# Patient Record
Sex: MALE | Race: WHITE | Employment: FULL TIME | URBAN - METROPOLITAN AREA
[De-identification: names, ages, dates, MRNs, and addresses within clinical notes are randomized per-mention and may not be internally consistent; named-entity substitution may affect disease eponyms.]

---

## 2017-01-05 ENCOUNTER — OFFICE VISIT (OUTPATIENT)
Dept: PAIN MANAGEMENT | Age: 57
End: 2017-01-05

## 2017-01-05 VITALS
BODY MASS INDEX: 26.32 KG/M2 | SYSTOLIC BLOOD PRESSURE: 135 MMHG | WEIGHT: 205 LBS | HEART RATE: 83 BPM | DIASTOLIC BLOOD PRESSURE: 90 MMHG

## 2017-01-05 DIAGNOSIS — G89.4 CHRONIC PAIN SYNDROME: ICD-10-CM

## 2017-01-05 DIAGNOSIS — M79.7 FIBROMYALGIA: ICD-10-CM

## 2017-01-05 DIAGNOSIS — M54.16 LUMBAR RADICULITIS: ICD-10-CM

## 2017-01-05 DIAGNOSIS — F51.01 PRIMARY INSOMNIA: ICD-10-CM

## 2017-01-05 DIAGNOSIS — M54.12 BRACHIAL NEURITIS OR RADICULITIS: ICD-10-CM

## 2017-01-05 DIAGNOSIS — M96.1 POSTLAMINECTOMY SYNDROME, LUMBAR REGION: ICD-10-CM

## 2017-01-05 DIAGNOSIS — M96.1 FAILED NECK SYNDROME: ICD-10-CM

## 2017-01-05 DIAGNOSIS — M47.816 OSTEOARTHRITIS OF LUMBAR SPINE, UNSPECIFIED SPINAL OSTEOARTHRITIS COMPLICATION STATUS: ICD-10-CM

## 2017-01-05 DIAGNOSIS — M51.36 DDD (DEGENERATIVE DISC DISEASE), LUMBAR: ICD-10-CM

## 2017-01-05 PROCEDURE — 99213 OFFICE O/P EST LOW 20 MIN: CPT | Performed by: INTERNAL MEDICINE

## 2017-01-05 PROCEDURE — 20553 NJX 1/MLT TRIGGER POINTS 3/>: CPT | Performed by: INTERNAL MEDICINE

## 2017-01-05 RX ORDER — TRIAMCINOLONE ACETONIDE 40 MG/ML
40 INJECTION, SUSPENSION INTRA-ARTICULAR; INTRAMUSCULAR ONCE
Status: COMPLETED | OUTPATIENT
Start: 2017-01-05 | End: 2017-01-05

## 2017-01-05 RX ORDER — OXYCODONE AND ACETAMINOPHEN 7.5; 325 MG/1; MG/1
1 TABLET ORAL EVERY 6 HOURS PRN
Qty: 120 TABLET | Refills: 0 | Status: SHIPPED | OUTPATIENT
Start: 2017-01-05 | End: 2017-02-07 | Stop reason: SDUPTHER

## 2017-01-05 RX ORDER — ZOLPIDEM TARTRATE 10 MG/1
TABLET ORAL
Qty: 30 TABLET | Refills: 1 | Status: SHIPPED | OUTPATIENT
Start: 2017-01-05 | End: 2017-02-07 | Stop reason: SDUPTHER

## 2017-01-05 RX ADMIN — TRIAMCINOLONE ACETONIDE 40 MG: 40 INJECTION, SUSPENSION INTRA-ARTICULAR; INTRAMUSCULAR at 09:12

## 2017-02-07 ENCOUNTER — OFFICE VISIT (OUTPATIENT)
Dept: PAIN MANAGEMENT | Age: 57
End: 2017-02-07

## 2017-02-07 VITALS
BODY MASS INDEX: 27.35 KG/M2 | HEART RATE: 73 BPM | SYSTOLIC BLOOD PRESSURE: 138 MMHG | WEIGHT: 213 LBS | DIASTOLIC BLOOD PRESSURE: 86 MMHG

## 2017-02-07 DIAGNOSIS — M96.1 POSTLAMINECTOMY SYNDROME, LUMBAR REGION: ICD-10-CM

## 2017-02-07 DIAGNOSIS — M96.1 FAILED NECK SYNDROME: ICD-10-CM

## 2017-02-07 DIAGNOSIS — M47.816 OSTEOARTHRITIS OF LUMBAR SPINE, UNSPECIFIED SPINAL OSTEOARTHRITIS COMPLICATION STATUS: ICD-10-CM

## 2017-02-07 DIAGNOSIS — G89.4 CHRONIC PAIN SYNDROME: ICD-10-CM

## 2017-02-07 DIAGNOSIS — M51.36 DDD (DEGENERATIVE DISC DISEASE), LUMBAR: ICD-10-CM

## 2017-02-07 DIAGNOSIS — F51.01 PRIMARY INSOMNIA: ICD-10-CM

## 2017-02-07 DIAGNOSIS — M54.16 LUMBAR RADICULITIS: ICD-10-CM

## 2017-02-07 DIAGNOSIS — M54.12 BRACHIAL NEURITIS OR RADICULITIS: ICD-10-CM

## 2017-02-07 DIAGNOSIS — M79.7 FIBROMYALGIA: ICD-10-CM

## 2017-02-07 PROCEDURE — 99213 OFFICE O/P EST LOW 20 MIN: CPT | Performed by: INTERNAL MEDICINE

## 2017-02-07 PROCEDURE — 20553 NJX 1/MLT TRIGGER POINTS 3/>: CPT | Performed by: INTERNAL MEDICINE

## 2017-02-07 RX ORDER — TRIAMCINOLONE ACETONIDE 40 MG/ML
40 INJECTION, SUSPENSION INTRA-ARTICULAR; INTRAMUSCULAR ONCE
Status: COMPLETED | OUTPATIENT
Start: 2017-02-07 | End: 2017-02-07

## 2017-02-07 RX ORDER — ZOLPIDEM TARTRATE 10 MG/1
TABLET ORAL
Qty: 30 TABLET | Refills: 1 | Status: SHIPPED | OUTPATIENT
Start: 2017-02-07 | End: 2017-03-07 | Stop reason: SDUPTHER

## 2017-02-07 RX ORDER — OXYCODONE AND ACETAMINOPHEN 7.5; 325 MG/1; MG/1
1 TABLET ORAL EVERY 6 HOURS PRN
Qty: 120 TABLET | Refills: 0 | Status: SHIPPED | OUTPATIENT
Start: 2017-02-07 | End: 2017-03-07 | Stop reason: SDUPTHER

## 2017-02-07 RX ADMIN — TRIAMCINOLONE ACETONIDE 40 MG: 40 INJECTION, SUSPENSION INTRA-ARTICULAR; INTRAMUSCULAR at 09:28

## 2017-03-07 ENCOUNTER — OFFICE VISIT (OUTPATIENT)
Dept: PAIN MANAGEMENT | Age: 57
End: 2017-03-07

## 2017-03-07 VITALS
SYSTOLIC BLOOD PRESSURE: 138 MMHG | BODY MASS INDEX: 26.06 KG/M2 | WEIGHT: 203 LBS | DIASTOLIC BLOOD PRESSURE: 88 MMHG | HEART RATE: 73 BPM

## 2017-03-07 DIAGNOSIS — G89.4 CHRONIC PAIN SYNDROME: ICD-10-CM

## 2017-03-07 DIAGNOSIS — M51.36 DDD (DEGENERATIVE DISC DISEASE), LUMBAR: ICD-10-CM

## 2017-03-07 DIAGNOSIS — M54.12 BRACHIAL NEURITIS OR RADICULITIS: ICD-10-CM

## 2017-03-07 DIAGNOSIS — M96.1 FAILED NECK SYNDROME: ICD-10-CM

## 2017-03-07 DIAGNOSIS — M47.816 OSTEOARTHRITIS OF LUMBAR SPINE, UNSPECIFIED SPINAL OSTEOARTHRITIS COMPLICATION STATUS: ICD-10-CM

## 2017-03-07 DIAGNOSIS — M54.16 LUMBAR RADICULITIS: ICD-10-CM

## 2017-03-07 DIAGNOSIS — M79.7 FIBROMYALGIA: ICD-10-CM

## 2017-03-07 DIAGNOSIS — M96.1 POSTLAMINECTOMY SYNDROME, LUMBAR REGION: ICD-10-CM

## 2017-03-07 PROCEDURE — 99213 OFFICE O/P EST LOW 20 MIN: CPT | Performed by: INTERNAL MEDICINE

## 2017-03-07 RX ORDER — OXYCODONE AND ACETAMINOPHEN 7.5; 325 MG/1; MG/1
1 TABLET ORAL EVERY 6 HOURS PRN
Qty: 120 TABLET | Refills: 0 | Status: SHIPPED | OUTPATIENT
Start: 2017-03-07 | End: 2017-04-04 | Stop reason: SDUPTHER

## 2017-03-07 RX ORDER — ZOLPIDEM TARTRATE 10 MG/1
TABLET ORAL
Qty: 30 TABLET | Refills: 1 | Status: SHIPPED | OUTPATIENT
Start: 2017-03-07 | End: 2017-04-04 | Stop reason: SDUPTHER

## 2017-04-04 ENCOUNTER — OFFICE VISIT (OUTPATIENT)
Dept: PAIN MANAGEMENT | Age: 57
End: 2017-04-04

## 2017-04-04 VITALS
SYSTOLIC BLOOD PRESSURE: 114 MMHG | WEIGHT: 205 LBS | BODY MASS INDEX: 26.32 KG/M2 | DIASTOLIC BLOOD PRESSURE: 70 MMHG | HEART RATE: 66 BPM

## 2017-04-04 DIAGNOSIS — M96.1 FAILED NECK SYNDROME: ICD-10-CM

## 2017-04-04 DIAGNOSIS — M54.12 BRACHIAL NEURITIS OR RADICULITIS: ICD-10-CM

## 2017-04-04 DIAGNOSIS — M47.816 OSTEOARTHRITIS OF LUMBAR SPINE, UNSPECIFIED SPINAL OSTEOARTHRITIS COMPLICATION STATUS: ICD-10-CM

## 2017-04-04 DIAGNOSIS — M54.16 LUMBAR RADICULITIS: ICD-10-CM

## 2017-04-04 DIAGNOSIS — M79.7 FIBROMYALGIA: ICD-10-CM

## 2017-04-04 DIAGNOSIS — M96.1 POSTLAMINECTOMY SYNDROME, LUMBAR REGION: ICD-10-CM

## 2017-04-04 DIAGNOSIS — G89.4 CHRONIC PAIN SYNDROME: ICD-10-CM

## 2017-04-04 DIAGNOSIS — M51.36 DDD (DEGENERATIVE DISC DISEASE), LUMBAR: ICD-10-CM

## 2017-04-04 PROCEDURE — 99213 OFFICE O/P EST LOW 20 MIN: CPT | Performed by: INTERNAL MEDICINE

## 2017-04-04 RX ORDER — OXYCODONE AND ACETAMINOPHEN 7.5; 325 MG/1; MG/1
1 TABLET ORAL EVERY 6 HOURS PRN
Qty: 120 TABLET | Refills: 0 | Status: SHIPPED | OUTPATIENT
Start: 2017-04-04 | End: 2017-05-02 | Stop reason: SDUPTHER

## 2017-04-04 RX ORDER — ZOLPIDEM TARTRATE 10 MG/1
TABLET ORAL
Qty: 30 TABLET | Refills: 1 | Status: SHIPPED | OUTPATIENT
Start: 2017-04-04 | End: 2017-05-02 | Stop reason: SDUPTHER

## 2017-05-02 ENCOUNTER — OFFICE VISIT (OUTPATIENT)
Dept: PAIN MANAGEMENT | Age: 57
End: 2017-05-02

## 2017-05-02 VITALS
DIASTOLIC BLOOD PRESSURE: 94 MMHG | SYSTOLIC BLOOD PRESSURE: 146 MMHG | BODY MASS INDEX: 27.35 KG/M2 | WEIGHT: 213 LBS | HEART RATE: 71 BPM

## 2017-05-02 DIAGNOSIS — M96.1 POSTLAMINECTOMY SYNDROME, LUMBAR REGION: ICD-10-CM

## 2017-05-02 DIAGNOSIS — G89.4 CHRONIC PAIN SYNDROME: ICD-10-CM

## 2017-05-02 DIAGNOSIS — M54.16 LUMBAR RADICULITIS: ICD-10-CM

## 2017-05-02 DIAGNOSIS — M51.36 DDD (DEGENERATIVE DISC DISEASE), LUMBAR: ICD-10-CM

## 2017-05-02 DIAGNOSIS — M96.1 FAILED NECK SYNDROME: ICD-10-CM

## 2017-05-02 DIAGNOSIS — M79.7 FIBROMYALGIA: ICD-10-CM

## 2017-05-02 DIAGNOSIS — M47.816 OSTEOARTHRITIS OF LUMBAR SPINE, UNSPECIFIED SPINAL OSTEOARTHRITIS COMPLICATION STATUS: ICD-10-CM

## 2017-05-02 PROCEDURE — 99213 OFFICE O/P EST LOW 20 MIN: CPT | Performed by: INTERNAL MEDICINE

## 2017-05-02 RX ORDER — ZOLPIDEM TARTRATE 10 MG/1
TABLET ORAL
Qty: 30 TABLET | Refills: 0 | Status: SHIPPED | OUTPATIENT
Start: 2017-05-02 | End: 2017-05-30 | Stop reason: SDUPTHER

## 2017-05-02 RX ORDER — OXYCODONE AND ACETAMINOPHEN 7.5; 325 MG/1; MG/1
1 TABLET ORAL EVERY 6 HOURS PRN
Qty: 120 TABLET | Refills: 0 | Status: SHIPPED | OUTPATIENT
Start: 2017-05-02 | End: 2017-05-30 | Stop reason: SDUPTHER

## 2017-05-30 ENCOUNTER — OFFICE VISIT (OUTPATIENT)
Dept: PAIN MANAGEMENT | Age: 57
End: 2017-05-30

## 2017-05-30 VITALS
HEART RATE: 72 BPM | DIASTOLIC BLOOD PRESSURE: 88 MMHG | WEIGHT: 213 LBS | BODY MASS INDEX: 27.35 KG/M2 | SYSTOLIC BLOOD PRESSURE: 139 MMHG

## 2017-05-30 DIAGNOSIS — M96.1 FAILED NECK SYNDROME: ICD-10-CM

## 2017-05-30 DIAGNOSIS — M54.16 LUMBAR RADICULITIS: ICD-10-CM

## 2017-05-30 DIAGNOSIS — M51.36 DDD (DEGENERATIVE DISC DISEASE), LUMBAR: ICD-10-CM

## 2017-05-30 DIAGNOSIS — M79.7 FIBROMYALGIA: ICD-10-CM

## 2017-05-30 DIAGNOSIS — G89.4 CHRONIC PAIN SYNDROME: ICD-10-CM

## 2017-05-30 DIAGNOSIS — M96.1 POSTLAMINECTOMY SYNDROME, LUMBAR REGION: ICD-10-CM

## 2017-05-30 PROCEDURE — 99213 OFFICE O/P EST LOW 20 MIN: CPT | Performed by: INTERNAL MEDICINE

## 2017-05-30 RX ORDER — OXYCODONE AND ACETAMINOPHEN 7.5; 325 MG/1; MG/1
1 TABLET ORAL EVERY 6 HOURS PRN
Qty: 120 TABLET | Refills: 0 | Status: SHIPPED | OUTPATIENT
Start: 2017-05-30 | End: 2017-06-27 | Stop reason: SDUPTHER

## 2017-05-30 RX ORDER — ZOLPIDEM TARTRATE 10 MG/1
TABLET ORAL
Qty: 30 TABLET | Refills: 0 | Status: SHIPPED | OUTPATIENT
Start: 2017-05-30 | End: 2017-06-27 | Stop reason: SDUPTHER

## 2017-06-08 DIAGNOSIS — M96.1 POSTLAMINECTOMY SYNDROME, LUMBAR REGION: ICD-10-CM

## 2017-06-08 DIAGNOSIS — M51.36 DDD (DEGENERATIVE DISC DISEASE), LUMBAR: ICD-10-CM

## 2017-06-08 DIAGNOSIS — G89.4 CHRONIC PAIN SYNDROME: ICD-10-CM

## 2017-06-08 DIAGNOSIS — M54.16 LUMBAR RADICULITIS: ICD-10-CM

## 2017-06-08 DIAGNOSIS — M96.1 FAILED NECK SYNDROME: ICD-10-CM

## 2017-06-08 DIAGNOSIS — M54.12 BRACHIAL NEURITIS OR RADICULITIS: ICD-10-CM

## 2017-06-08 DIAGNOSIS — M47.816 OSTEOARTHRITIS OF LUMBAR SPINE, UNSPECIFIED SPINAL OSTEOARTHRITIS COMPLICATION STATUS: ICD-10-CM

## 2017-06-08 RX ORDER — DICLOFENAC SODIUM 75 MG/1
TABLET, DELAYED RELEASE ORAL
Qty: 60 TABLET | Refills: 0 | Status: SHIPPED | OUTPATIENT
Start: 2017-06-08 | End: 2017-06-27 | Stop reason: SDUPTHER

## 2017-06-27 ENCOUNTER — OFFICE VISIT (OUTPATIENT)
Dept: PAIN MANAGEMENT | Age: 57
End: 2017-06-27

## 2017-06-27 VITALS
BODY MASS INDEX: 27.35 KG/M2 | WEIGHT: 213 LBS | HEART RATE: 65 BPM | DIASTOLIC BLOOD PRESSURE: 76 MMHG | SYSTOLIC BLOOD PRESSURE: 125 MMHG

## 2017-06-27 DIAGNOSIS — M54.12 BRACHIAL NEURITIS OR RADICULITIS: ICD-10-CM

## 2017-06-27 DIAGNOSIS — M54.16 LUMBAR RADICULITIS: ICD-10-CM

## 2017-06-27 DIAGNOSIS — M96.1 FAILED NECK SYNDROME: ICD-10-CM

## 2017-06-27 DIAGNOSIS — M96.1 POSTLAMINECTOMY SYNDROME, LUMBAR REGION: ICD-10-CM

## 2017-06-27 DIAGNOSIS — G89.4 CHRONIC PAIN SYNDROME: ICD-10-CM

## 2017-06-27 DIAGNOSIS — M79.7 FIBROMYALGIA: ICD-10-CM

## 2017-06-27 DIAGNOSIS — M51.36 DDD (DEGENERATIVE DISC DISEASE), LUMBAR: ICD-10-CM

## 2017-06-27 PROCEDURE — 99213 OFFICE O/P EST LOW 20 MIN: CPT | Performed by: INTERNAL MEDICINE

## 2017-06-27 RX ORDER — OXYCODONE AND ACETAMINOPHEN 7.5; 325 MG/1; MG/1
1 TABLET ORAL EVERY 6 HOURS PRN
Qty: 120 TABLET | Refills: 0 | Status: SHIPPED | OUTPATIENT
Start: 2017-06-27 | End: 2017-07-25 | Stop reason: SDUPTHER

## 2017-06-27 RX ORDER — ZOLPIDEM TARTRATE 10 MG/1
TABLET ORAL
Qty: 30 TABLET | Refills: 0 | Status: SHIPPED | OUTPATIENT
Start: 2017-06-27 | End: 2017-07-25 | Stop reason: SDUPTHER

## 2017-06-27 RX ORDER — DICLOFENAC SODIUM 75 MG/1
TABLET, DELAYED RELEASE ORAL
Qty: 60 TABLET | Refills: 0 | Status: SHIPPED | OUTPATIENT
Start: 2017-06-27 | End: 2017-07-25 | Stop reason: SDUPTHER

## 2017-07-25 ENCOUNTER — OFFICE VISIT (OUTPATIENT)
Dept: PAIN MANAGEMENT | Age: 57
End: 2017-07-25

## 2017-07-25 VITALS
SYSTOLIC BLOOD PRESSURE: 133 MMHG | DIASTOLIC BLOOD PRESSURE: 78 MMHG | BODY MASS INDEX: 26.96 KG/M2 | WEIGHT: 210 LBS | HEART RATE: 77 BPM

## 2017-07-25 DIAGNOSIS — M47.816 OSTEOARTHRITIS OF LUMBAR SPINE, UNSPECIFIED SPINAL OSTEOARTHRITIS COMPLICATION STATUS: ICD-10-CM

## 2017-07-25 DIAGNOSIS — M79.7 FIBROMYALGIA: ICD-10-CM

## 2017-07-25 DIAGNOSIS — F51.01 PRIMARY INSOMNIA: ICD-10-CM

## 2017-07-25 DIAGNOSIS — M96.1 FAILED NECK SYNDROME: ICD-10-CM

## 2017-07-25 DIAGNOSIS — M51.36 DDD (DEGENERATIVE DISC DISEASE), LUMBAR: ICD-10-CM

## 2017-07-25 DIAGNOSIS — M54.12 BRACHIAL NEURITIS OR RADICULITIS: ICD-10-CM

## 2017-07-25 DIAGNOSIS — M54.16 LUMBAR RADICULITIS: ICD-10-CM

## 2017-07-25 DIAGNOSIS — M96.1 POSTLAMINECTOMY SYNDROME, LUMBAR REGION: ICD-10-CM

## 2017-07-25 DIAGNOSIS — G89.4 CHRONIC PAIN SYNDROME: ICD-10-CM

## 2017-07-25 PROCEDURE — 99213 OFFICE O/P EST LOW 20 MIN: CPT | Performed by: INTERNAL MEDICINE

## 2017-07-25 RX ORDER — ZOLPIDEM TARTRATE 10 MG/1
TABLET ORAL
Qty: 30 TABLET | Refills: 0 | Status: SHIPPED | OUTPATIENT
Start: 2017-07-25 | End: 2017-08-25 | Stop reason: SDUPTHER

## 2017-07-25 RX ORDER — OXYCODONE AND ACETAMINOPHEN 7.5; 325 MG/1; MG/1
1 TABLET ORAL EVERY 6 HOURS PRN
Qty: 120 TABLET | Refills: 0 | Status: SHIPPED | OUTPATIENT
Start: 2017-07-25 | End: 2017-08-25 | Stop reason: SDUPTHER

## 2017-07-25 RX ORDER — DICLOFENAC SODIUM 75 MG/1
TABLET, DELAYED RELEASE ORAL
Qty: 60 TABLET | Refills: 1 | Status: SHIPPED | OUTPATIENT
Start: 2017-07-25 | End: 2017-09-26 | Stop reason: SDUPTHER

## 2017-08-25 ENCOUNTER — OFFICE VISIT (OUTPATIENT)
Dept: PAIN MANAGEMENT | Age: 57
End: 2017-08-25

## 2017-08-25 VITALS
BODY MASS INDEX: 28.04 KG/M2 | WEIGHT: 218.4 LBS | DIASTOLIC BLOOD PRESSURE: 90 MMHG | SYSTOLIC BLOOD PRESSURE: 139 MMHG | HEART RATE: 68 BPM

## 2017-08-25 DIAGNOSIS — M51.36 DDD (DEGENERATIVE DISC DISEASE), LUMBAR: ICD-10-CM

## 2017-08-25 DIAGNOSIS — M96.1 POSTLAMINECTOMY SYNDROME, LUMBAR REGION: ICD-10-CM

## 2017-08-25 DIAGNOSIS — M96.1 FAILED NECK SYNDROME: ICD-10-CM

## 2017-08-25 DIAGNOSIS — M79.7 FIBROMYALGIA: ICD-10-CM

## 2017-08-25 DIAGNOSIS — M54.16 LUMBAR RADICULITIS: ICD-10-CM

## 2017-08-25 DIAGNOSIS — G89.4 CHRONIC PAIN SYNDROME: ICD-10-CM

## 2017-08-25 PROCEDURE — 99213 OFFICE O/P EST LOW 20 MIN: CPT | Performed by: INTERNAL MEDICINE

## 2017-08-25 PROCEDURE — 20553 NJX 1/MLT TRIGGER POINTS 3/>: CPT | Performed by: INTERNAL MEDICINE

## 2017-08-25 RX ORDER — OXYCODONE AND ACETAMINOPHEN 7.5; 325 MG/1; MG/1
1 TABLET ORAL EVERY 6 HOURS PRN
Qty: 120 TABLET | Refills: 0 | Status: SHIPPED | OUTPATIENT
Start: 2017-08-25 | End: 2017-09-26 | Stop reason: SDUPTHER

## 2017-08-25 RX ORDER — TRIAMCINOLONE ACETONIDE 40 MG/ML
40 INJECTION, SUSPENSION INTRA-ARTICULAR; INTRAMUSCULAR ONCE
Status: COMPLETED | OUTPATIENT
Start: 2017-08-25 | End: 2017-08-25

## 2017-08-25 RX ORDER — ZOLPIDEM TARTRATE 10 MG/1
TABLET ORAL
Qty: 30 TABLET | Refills: 0 | Status: SHIPPED | OUTPATIENT
Start: 2017-08-25 | End: 2017-09-26 | Stop reason: SDUPTHER

## 2017-08-25 RX ADMIN — TRIAMCINOLONE ACETONIDE 40 MG: 40 INJECTION, SUSPENSION INTRA-ARTICULAR; INTRAMUSCULAR at 13:25

## 2017-09-26 ENCOUNTER — OFFICE VISIT (OUTPATIENT)
Dept: PAIN MANAGEMENT | Age: 57
End: 2017-09-26

## 2017-09-26 VITALS
BODY MASS INDEX: 27.48 KG/M2 | HEART RATE: 75 BPM | DIASTOLIC BLOOD PRESSURE: 96 MMHG | SYSTOLIC BLOOD PRESSURE: 137 MMHG | WEIGHT: 214 LBS

## 2017-09-26 DIAGNOSIS — M96.1 POSTLAMINECTOMY SYNDROME, LUMBAR REGION: ICD-10-CM

## 2017-09-26 DIAGNOSIS — M47.816 OSTEOARTHRITIS OF LUMBAR SPINE, UNSPECIFIED SPINAL OSTEOARTHRITIS COMPLICATION STATUS: ICD-10-CM

## 2017-09-26 DIAGNOSIS — M51.36 DDD (DEGENERATIVE DISC DISEASE), LUMBAR: ICD-10-CM

## 2017-09-26 DIAGNOSIS — M54.16 LUMBAR RADICULITIS: ICD-10-CM

## 2017-09-26 DIAGNOSIS — M54.12 BRACHIAL NEURITIS OR RADICULITIS: ICD-10-CM

## 2017-09-26 DIAGNOSIS — G89.4 CHRONIC PAIN SYNDROME: ICD-10-CM

## 2017-09-26 DIAGNOSIS — M96.1 FAILED NECK SYNDROME: ICD-10-CM

## 2017-09-26 DIAGNOSIS — M79.7 FIBROMYALGIA: ICD-10-CM

## 2017-09-26 PROCEDURE — 99213 OFFICE O/P EST LOW 20 MIN: CPT | Performed by: INTERNAL MEDICINE

## 2017-09-26 RX ORDER — OXYCODONE AND ACETAMINOPHEN 7.5; 325 MG/1; MG/1
1 TABLET ORAL EVERY 6 HOURS PRN
Qty: 112 TABLET | Refills: 0 | Status: SHIPPED | OUTPATIENT
Start: 2017-09-26 | End: 2017-09-26 | Stop reason: CLARIF

## 2017-09-26 RX ORDER — OXYCODONE AND ACETAMINOPHEN 7.5; 325 MG/1; MG/1
1 TABLET ORAL EVERY 6 HOURS PRN
Qty: 120 TABLET | Refills: 0 | Status: SHIPPED | OUTPATIENT
Start: 2017-09-26 | End: 2017-10-24 | Stop reason: SDUPTHER

## 2017-09-26 RX ORDER — ZOLPIDEM TARTRATE 10 MG/1
TABLET ORAL
Qty: 30 TABLET | Refills: 0 | Status: SHIPPED | OUTPATIENT
Start: 2017-09-26 | End: 2017-10-24 | Stop reason: SDUPTHER

## 2017-09-26 RX ORDER — DICLOFENAC SODIUM 75 MG/1
TABLET, DELAYED RELEASE ORAL
Qty: 60 TABLET | Refills: 0 | Status: SHIPPED | OUTPATIENT
Start: 2017-09-26 | End: 2017-10-24 | Stop reason: SDUPTHER

## 2017-10-24 ENCOUNTER — OFFICE VISIT (OUTPATIENT)
Dept: PAIN MANAGEMENT | Age: 57
End: 2017-10-24

## 2017-10-24 VITALS
DIASTOLIC BLOOD PRESSURE: 83 MMHG | BODY MASS INDEX: 27.22 KG/M2 | WEIGHT: 212 LBS | HEART RATE: 73 BPM | SYSTOLIC BLOOD PRESSURE: 140 MMHG

## 2017-10-24 DIAGNOSIS — M96.1 POSTLAMINECTOMY SYNDROME, LUMBAR REGION: ICD-10-CM

## 2017-10-24 DIAGNOSIS — M54.12 BRACHIAL NEURITIS OR RADICULITIS: ICD-10-CM

## 2017-10-24 DIAGNOSIS — M96.1 FAILED NECK SYNDROME: ICD-10-CM

## 2017-10-24 DIAGNOSIS — M51.36 DDD (DEGENERATIVE DISC DISEASE), LUMBAR: ICD-10-CM

## 2017-10-24 DIAGNOSIS — M79.7 FIBROMYALGIA: ICD-10-CM

## 2017-10-24 DIAGNOSIS — M54.16 LUMBAR RADICULITIS: ICD-10-CM

## 2017-10-24 DIAGNOSIS — G89.4 CHRONIC PAIN SYNDROME: ICD-10-CM

## 2017-10-24 PROCEDURE — G8484 FLU IMMUNIZE NO ADMIN: HCPCS | Performed by: INTERNAL MEDICINE

## 2017-10-24 PROCEDURE — 99213 OFFICE O/P EST LOW 20 MIN: CPT | Performed by: INTERNAL MEDICINE

## 2017-10-24 PROCEDURE — 1036F TOBACCO NON-USER: CPT | Performed by: INTERNAL MEDICINE

## 2017-10-24 PROCEDURE — G8419 CALC BMI OUT NRM PARAM NOF/U: HCPCS | Performed by: INTERNAL MEDICINE

## 2017-10-24 PROCEDURE — G8427 DOCREV CUR MEDS BY ELIG CLIN: HCPCS | Performed by: INTERNAL MEDICINE

## 2017-10-24 PROCEDURE — 3017F COLORECTAL CA SCREEN DOC REV: CPT | Performed by: INTERNAL MEDICINE

## 2017-10-24 RX ORDER — DICLOFENAC SODIUM 75 MG/1
TABLET, DELAYED RELEASE ORAL
Qty: 60 TABLET | Refills: 0 | Status: SHIPPED | OUTPATIENT
Start: 2017-10-24 | End: 2017-12-18 | Stop reason: SDUPTHER

## 2017-10-24 RX ORDER — ZOLPIDEM TARTRATE 10 MG/1
TABLET ORAL
Qty: 30 TABLET | Refills: 0 | Status: SHIPPED | OUTPATIENT
Start: 2017-10-24 | End: 2017-11-21 | Stop reason: SDUPTHER

## 2017-10-24 RX ORDER — OXYCODONE AND ACETAMINOPHEN 7.5; 325 MG/1; MG/1
1 TABLET ORAL EVERY 6 HOURS PRN
Qty: 120 TABLET | Refills: 0 | Status: SHIPPED | OUTPATIENT
Start: 2017-10-24 | End: 2017-11-21 | Stop reason: SDUPTHER

## 2017-10-24 NOTE — PROGRESS NOTES
[facility]  1960  [id number]    HISTORY OF PRESENT ILLNESS:  Mr. Dylan Pisano is a 65 y.o. male returns for a follow up visit for multiple medical problems. His current presenting problems are   1. Chronic pain syndrome    2. Postlaminectomy syndrome, lumbar region    3. DDD (degenerative disc disease), lumbar    4. Lumbar radiculitis    5. Failed neck syndrome    6. Fibromyalgia    7. Osteoarthritis of lumbar spine, unspecified spinal osteoarthritis complication status    8. Primary insomnia    . As per information/history obtained from the PADT(patient assessment and documentation tool) - He complains of pain in the neck, upper back and lower back with radiation to the hands Left, buttocks and feet Left He rates the pain 4/10 and describes it as sharp, aching. Pain is made worse by: movement, walking, standing, sitting, bending, lifting. Current treatment regimen has helped relieve about 60% of the pain. He denies side effects from the current pain regimen. Patient reports that since the last follow up visit the physical functioning is unchanged, family/social relationships are unchanged, mood is unchanged and sleep patterns are unchanged, and that the overall functioning is unchanged. Patient denies neurological bowel or bladder. Patient denies misusing/abusing his narcotic pain medications or using any illegal drugs. There are No indicators for possible drug abuse, addiction or diversion problems. Upon obtaining the medical history from Mr. Dylan Pisano regarding today's office visit for his presenting problems, patient states his pain has been baseline. He states his neck and back pain has been tolerable. He denies any side effects. Mr. Dylan Pisano mentions using Ambien, which helps with sleep. Patient states he sleeps well. Has normal sleep latency. Averages about 5-7 hours of sleep a night. Denies any signs of sleep apnea. Feels rested in the AM. Denies any sleep attacks during the day.  Medication Flat/blunted . IMPRESSION:   1. Chronic pain syndrome    2. Postlaminectomy syndrome, lumbar region    3. DDD (degenerative disc disease), lumbar    4. Lumbar radiculitis    5. Failed neck syndrome    6. Fibromyalgia    7. Brachial neuritis or radiculitis        PLAN:  Informed verbal consent was obtained  -Continue with current regimen   -ROM/stretching exercises as advised   -Continue with Percocet 3-4 times per day   -he was advised proper sleep hygiene-told to avoid:use of caffeine or other stimulants after noon, alcohol use near bedtime, long or frequent naps during the day, erratic sleep schedule, heavy meals near bedtime, vigorous exercise near bedtime and use of electronic devices near bedtime  -Continue Ambien   -Labs ordered CBC, CMP, and TSH. Current Outpatient Prescriptions   Medication Sig Dispense Refill    zolpidem (AMBIEN) 10 MG tablet TAKE ONE TABLET BY MOUTH ONCE NIGHTLY AS NEEDED FOR SLEEP 30 tablet 0    diclofenac (VOLTAREN) 75 MG EC tablet TAKE ONE TABLET BY MOUTH TWICE A DAY 60 tablet 0    oxyCODONE-acetaminophen (PERCOCET) 7.5-325 MG per tablet Take 1 tablet by mouth every 6 hours as needed for Pain  Max  4 per day. 120 tablet 0    ALPRAZolam (XANAX) 0.5 MG tablet Take 0.5 mg by mouth nightly as needed. No current facility-administered medications for this visit. I will continue his current medication regimen  which is part of the above treatment schedule. It has been helping with Mr. Melquiades Sousa chronic  medical problems which for this visit include:   Diagnoses of Chronic pain syndrome, Postlaminectomy syndrome, lumbar region, DDD (degenerative disc disease), lumbar, Lumbar radiculitis, Failed neck syndrome, Fibromyalgia, Osteoarthritis of lumbar spine, unspecified spinal osteoarthritis complication status, and Primary insomnia were pertinent to this visit.    Risks and benefits of the medications and other alternative treatments  including no treatment were discussed

## 2017-11-01 ENCOUNTER — TELEPHONE (OUTPATIENT)
Dept: PAIN MANAGEMENT | Age: 57
End: 2017-11-01

## 2017-11-01 NOTE — TELEPHONE ENCOUNTER
Following up on overdue results for CBC, CMP and TSH. RSM will review the need for testing with the patient at his next monthly office visit.

## 2017-11-21 ENCOUNTER — OFFICE VISIT (OUTPATIENT)
Dept: PAIN MANAGEMENT | Age: 57
End: 2017-11-21

## 2017-11-21 VITALS
HEART RATE: 80 BPM | SYSTOLIC BLOOD PRESSURE: 146 MMHG | WEIGHT: 212 LBS | DIASTOLIC BLOOD PRESSURE: 97 MMHG | BODY MASS INDEX: 27.22 KG/M2

## 2017-11-21 DIAGNOSIS — M51.36 DDD (DEGENERATIVE DISC DISEASE), LUMBAR: ICD-10-CM

## 2017-11-21 DIAGNOSIS — M47.816 LUMBAR SPONDYLOSIS: ICD-10-CM

## 2017-11-21 DIAGNOSIS — M96.1 POSTLAMINECTOMY SYNDROME, LUMBAR REGION: ICD-10-CM

## 2017-11-21 DIAGNOSIS — M96.1 FAILED NECK SYNDROME: ICD-10-CM

## 2017-11-21 DIAGNOSIS — M54.16 LUMBAR RADICULITIS: ICD-10-CM

## 2017-11-21 DIAGNOSIS — M79.7 FIBROMYALGIA: ICD-10-CM

## 2017-11-21 DIAGNOSIS — G89.4 CHRONIC PAIN SYNDROME: ICD-10-CM

## 2017-11-21 PROCEDURE — 1036F TOBACCO NON-USER: CPT | Performed by: INTERNAL MEDICINE

## 2017-11-21 PROCEDURE — 99213 OFFICE O/P EST LOW 20 MIN: CPT | Performed by: INTERNAL MEDICINE

## 2017-11-21 PROCEDURE — G8484 FLU IMMUNIZE NO ADMIN: HCPCS | Performed by: INTERNAL MEDICINE

## 2017-11-21 PROCEDURE — G8419 CALC BMI OUT NRM PARAM NOF/U: HCPCS | Performed by: INTERNAL MEDICINE

## 2017-11-21 PROCEDURE — G8427 DOCREV CUR MEDS BY ELIG CLIN: HCPCS | Performed by: INTERNAL MEDICINE

## 2017-11-21 PROCEDURE — 3017F COLORECTAL CA SCREEN DOC REV: CPT | Performed by: INTERNAL MEDICINE

## 2017-11-21 RX ORDER — ZOLPIDEM TARTRATE 10 MG/1
TABLET ORAL
Qty: 30 TABLET | Refills: 0 | Status: SHIPPED | OUTPATIENT
Start: 2017-11-21 | End: 2017-12-18 | Stop reason: SDUPTHER

## 2017-11-21 RX ORDER — OXYCODONE AND ACETAMINOPHEN 7.5; 325 MG/1; MG/1
1 TABLET ORAL EVERY 6 HOURS PRN
Qty: 120 TABLET | Refills: 0 | Status: SHIPPED | OUTPATIENT
Start: 2017-11-21 | End: 2017-12-18 | Stop reason: SDUPTHER

## 2017-11-21 NOTE — PROGRESS NOTES
Vicente Toscano  1960  [id number]    HISTORY OF PRESENT ILLNESS:  Mr. Eva Chen is a 65 y.o. male returns for a follow up visit for multiple medical problems. His current presenting problems are   1. Chronic pain syndrome    2. Fibromyalgia    3. Primary insomnia    4. DDD (degenerative disc disease), lumbar    5. Lumbar radiculitis    6. Lumbar spondylosis    7. Failed neck syndrome    8. Postlaminectomy syndrome, lumbar region    . As per information/history obtained from the PADT(patient assessment and documentation tool) - He complains of pain in the neck, shoulders Bilateral, upper back, mid back and lower back with radiation to the hands Bilateral, buttocks and feet Bilateral He rates the pain 4/10 and describes it as aching, throbbing. Pain is made worse by: nothing, movement, walking, standing, sitting, lifting. Current treatment regimen has helped relieve about 60% of the pain. He denies side effects from the current pain regimen. Patient reports that since the last follow up visit the physical functioning is unchanged, family/social relationships are unchanged, mood is unchanged and sleep patterns are unchanged, and that the overall functioning is unchanged. Patient denies neurological bowel or bladder. Patient denies misusing/abusing his narcotic pain medications or using any illegal drugs. There are No indicators for possible drug abuse, addiction or diversion problems. Upon obtaining the medical history from Mr. vEa Chen regarding today's office visit for his presenting problems,  Upon obtaining the medical history from Mr. Eva Chen regarding today's office visit for his presenting problems, patient states doing ok with the medications. Mr. Eva Chen mentions he had labs done recently. He says he is using Percocet 4 per day. Patient states his sleep is fair. Has fairly normal sleep latency. Averages about 4-6 hours of sleep a night. Denies any signs of sleep apnea.  Feels somewhat rested in the morning. Patient mentions that he is working full time        ALLERGIES: Patients list of allergies were reviewed     MEDICATIONS: Mr. Verner Pry list of medications were reviewed. His current medications are   Outpatient Medications Prior to Visit   Medication Sig Dispense Refill    oxyCODONE-acetaminophen (PERCOCET) 7.5-325 MG per tablet Take 1 tablet by mouth every 6 hours as needed for Pain  Max  4 per day. 120 tablet 0    zolpidem (AMBIEN) 10 MG tablet TAKE ONE TABLET BY MOUTH ONCE NIGHTLY AS NEEDED FOR SLEEP 30 tablet 0    diclofenac (VOLTAREN) 75 MG EC tablet TAKE ONE TABLET BY MOUTH TWICE A DAY 60 tablet 0    ALPRAZolam (XANAX) 0.5 MG tablet Take 0.5 mg by mouth nightly as needed. No facility-administered medications prior to visit. SOCIAL/FAMILY/PAST MEDICAL HISTORY: Mr. Janusz Cali, family and past medical history was reviewed. REVIEW OF SYSTEMS:    Respiratory: Negative for apnea, chest tightness and shortness of breath or change in baseline breathing. Gastrointestinal: Negative for nausea, vomiting, abdominal pain, diarrhea, constipation, blood in stool and abdominal distention. PHYSICAL EXAM:   Nursing note and vitals reviewed. BP (!) 146/97   Pulse 80   Wt 212 lb (96.2 kg)   BMI 27.22 kg/m²   Constitutional: He appears well-developed and well-nourished. No acute distress. Skin: Skin is warm and dry, good turgor. No rash noted. He is not diaphoretic. Cardiovascular: Normal rate, regular rhythm, normal heart sounds, and does not have murmur. Pulmonary/Chest: Effort normal. No respiratory distress. He does not have wheezes in the lung fields. He has no rales. Neurological/Psychiatric:He is alert and oriented to person, place, and time. Coordination is  normal. His mood isAppropriate and affect is Flat/blunted . IMPRESSION:   1. Chronic pain syndrome    2. Fibromyalgia    3. DDD (degenerative disc disease), lumbar    4. Lumbar radiculitis    5.  Lumbar

## 2017-12-18 ENCOUNTER — OFFICE VISIT (OUTPATIENT)
Dept: PAIN MANAGEMENT | Age: 57
End: 2017-12-18

## 2017-12-18 VITALS
BODY MASS INDEX: 27.09 KG/M2 | HEART RATE: 79 BPM | SYSTOLIC BLOOD PRESSURE: 130 MMHG | WEIGHT: 211 LBS | DIASTOLIC BLOOD PRESSURE: 89 MMHG

## 2017-12-18 DIAGNOSIS — M54.16 LUMBAR RADICULITIS: ICD-10-CM

## 2017-12-18 DIAGNOSIS — M96.1 FAILED NECK SYNDROME: ICD-10-CM

## 2017-12-18 DIAGNOSIS — M79.7 FIBROMYALGIA: ICD-10-CM

## 2017-12-18 DIAGNOSIS — M51.36 DDD (DEGENERATIVE DISC DISEASE), LUMBAR: ICD-10-CM

## 2017-12-18 DIAGNOSIS — M54.12 BRACHIAL NEURITIS OR RADICULITIS: ICD-10-CM

## 2017-12-18 DIAGNOSIS — M96.1 POSTLAMINECTOMY SYNDROME, LUMBAR REGION: ICD-10-CM

## 2017-12-18 DIAGNOSIS — G89.4 CHRONIC PAIN SYNDROME: ICD-10-CM

## 2017-12-18 DIAGNOSIS — F51.01 PRIMARY INSOMNIA: ICD-10-CM

## 2017-12-18 PROCEDURE — G8419 CALC BMI OUT NRM PARAM NOF/U: HCPCS | Performed by: INTERNAL MEDICINE

## 2017-12-18 PROCEDURE — 3017F COLORECTAL CA SCREEN DOC REV: CPT | Performed by: INTERNAL MEDICINE

## 2017-12-18 PROCEDURE — G8484 FLU IMMUNIZE NO ADMIN: HCPCS | Performed by: INTERNAL MEDICINE

## 2017-12-18 PROCEDURE — G8427 DOCREV CUR MEDS BY ELIG CLIN: HCPCS | Performed by: INTERNAL MEDICINE

## 2017-12-18 PROCEDURE — 1036F TOBACCO NON-USER: CPT | Performed by: INTERNAL MEDICINE

## 2017-12-18 PROCEDURE — 99214 OFFICE O/P EST MOD 30 MIN: CPT | Performed by: INTERNAL MEDICINE

## 2017-12-18 RX ORDER — ZOLPIDEM TARTRATE 10 MG/1
TABLET ORAL
Qty: 30 TABLET | Refills: 0 | Status: SHIPPED | OUTPATIENT
Start: 2017-12-18 | End: 2018-01-17 | Stop reason: SDUPTHER

## 2017-12-18 RX ORDER — DICLOFENAC SODIUM 75 MG/1
TABLET, DELAYED RELEASE ORAL
Qty: 60 TABLET | Refills: 0 | Status: SHIPPED | OUTPATIENT
Start: 2017-12-18 | End: 2018-01-17 | Stop reason: SDUPTHER

## 2017-12-18 RX ORDER — OXYCODONE AND ACETAMINOPHEN 7.5; 325 MG/1; MG/1
1 TABLET ORAL EVERY 6 HOURS PRN
Qty: 120 TABLET | Refills: 0 | Status: SHIPPED | OUTPATIENT
Start: 2017-12-18 | End: 2018-01-17 | Stop reason: SDUPTHER

## 2017-12-18 RX ORDER — METHYLPREDNISOLONE 4 MG/1
TABLET ORAL
Qty: 1 KIT | Refills: 0 | Status: SHIPPED | OUTPATIENT
Start: 2017-12-18 | End: 2018-01-17

## 2017-12-18 NOTE — PROGRESS NOTES
reports using Percocet 4 times per day. Patient denies any GERD symptoms. ALLERGIES/PAST MED/FAM/SOC HISTORY: Mr. Manisha Sierra allergies, past medical, family and social history were reviewed in the chart and also listed below. Social History     Social History    Marital status:      Spouse name: N/A    Number of children: 4    Years of education: N/A     Occupational History          Social History Main Topics    Smoking status: Former Smoker     Types: Cigarettes     Quit date: 10/14/2002    Smokeless tobacco: Never Used    Alcohol use No    Drug use: No    Sexual activity: Not on file     Other Topics Concern    Not on file     Social History Narrative    No narrative on file       Mr. Manisha Sierra current medications are   Outpatient Medications Prior to Visit   Medication Sig Dispense Refill    oxyCODONE-acetaminophen (PERCOCET) 7.5-325 MG per tablet Take 1 tablet by mouth every 6 hours as needed for Pain  Max  4 per day. 120 tablet 0    zolpidem (AMBIEN) 10 MG tablet TAKE ONE TABLET BY MOUTH ONCE NIGHTLY AS NEEDED FOR SLEEP. 30 tablet 0    diclofenac (VOLTAREN) 75 MG EC tablet TAKE ONE TABLET BY MOUTH TWICE A DAY 60 tablet 0    ALPRAZolam (XANAX) 0.5 MG tablet Take 0.5 mg by mouth nightly as needed. No facility-administered medications prior to visit. REVIEW OF SYSTEMS:   Constitutional: Negative for fatigue and unexpected weight change. Eyes: Negative for visual disturbance. Respiratory: Negative for shortness of breath. Cardiovascular: Negative for chest pain, palpitations  Gastrointestinal: Negative for blood in stool, abdominal distention, nausea, vomiting, abdominal pain, diarrhea,constipation. Skin: Negative for color change or any abnormal bruising.    Neurological: Negative for speech difficulty, weakness and light-headedness, dizziness, tremors, sleepiness  Psychiatric/Behavioral: Negative for suicidal ideas, hallucinations, behavioral problems, self-injury, decreased concentration/cognition, agitation, confusion. PHYSICAL EXAM:   Nursing note and vitals reviewed. /89 (Site: Right Arm, Position: Sitting)   Pulse 79   Wt 211 lb (95.7 kg)   BMI 27.09 kg/m²   General Appearance: Patient is well nourished, well developed. Skin: Skin is warm and dry, good turgor . No rash or lesions noted. He is not diaphoretic. Pulmonary/Chest: Effort normal. No respiratory distress or use of accessory muscles. Auscultation revealing normal air entry. He does not have wheezes in the lung fields. He has no rales. Cardiovascular: Normal rate, regular rhythm, normal heart sounds, and does not have murmur. Exam reveals no gallop and no friction rub. Abdominal: Soft. Bowel sounds are normal.  On inspection of abdomen is flat no distension and no mass. No tenderness. He has no rebound and no guarding. Musculoskeletal / Extremities: Range of motion is normal. Gait is normal, assistive devices use: none. He exhibits edema: none, and no tenderness. Neurological/Psychiatric:He is alert and oriented to person, place, and time. Coordination is  normal.   Judgement and Insight is normal  His mood is Appropriate and affect is Neutral/Euthymic(normal) . His behavior is normal.   thought content normal.    Back: + TPI's, decreased ROM     IMPRESSION:     1. Fibromyalgia    2. Failed neck syndrome    3. Chronic pain syndrome    4. Postlaminectomy syndrome, lumbar region    5. DDD (degenerative disc disease), lumbar    6. Lumbar radiculitis    7. Primary insomnia        PLAN:  Informed verbal consent was obtained.   -Use Robaxin 500 mg BID along with medrol dose pack to help with pain flare up   -Hold off on the Voltaren   -he was advised proper sleep hygiene-told to avoid:use of caffeine or other stimulants after noon, alcohol use near bedtime, long or frequent naps during the day, erratic sleep schedule, heavy meals near bedtime, vigorous exercise near bedtime and use of electronic devices near bedtime  -Continue with Ambien   -He was advised to increase fluids ( 5-7  glasses of fluid daily), limit caffeine, avoid cheese products, increase dietary fiber, increase activity and exercise as tolerated and relax regularly and enjoy meals  -Continue with Percocet 4 times per day   -Adv Biofeedback, relaxation and meditation techniques. Referral to psychologist for CBT was also discussed with patient  -Check CMP, increase fluids, will review kidney function     Mr. Michelle Bullock will be prescribed  the medications  listed below which are for treatment of his presenting  medical problems which for this visit include:   Diagnoses of Fibromyalgia, Failed neck syndrome, Chronic pain syndrome, Postlaminectomy syndrome, lumbar region, DDD (degenerative disc disease), lumbar, Lumbar radiculitis, and Primary insomnia were pertinent to this visit. Medications/orders associated with this visit:    Current Outpatient Prescriptions   Medication Sig Dispense Refill    oxyCODONE-acetaminophen (PERCOCET) 7.5-325 MG per tablet Take 1 tablet by mouth every 6 hours as needed for Pain  Max  4 per day. 120 tablet 0    zolpidem (AMBIEN) 10 MG tablet TAKE ONE TABLET BY MOUTH ONCE NIGHTLY AS NEEDED FOR SLEEP. 30 tablet 0    diclofenac (VOLTAREN) 75 MG EC tablet TAKE ONE TABLET BY MOUTH TWICE A DAY 60 tablet 0    ALPRAZolam (XANAX) 0.5 MG tablet Take 0.5 mg by mouth nightly as needed. No current facility-administered medications for this visit. Goals of current treatment regimen include improvement in pain, restoration of functioning- with focus on improvement in physical performance, general activity, work or disability,emotional distress, health care utilization and  decreased medication consumption. Will continue to monitor progress towards achieving/maintaining therapeutic goals with special emphasis on  1. Improvement in perceived interfernce  of pain with ADL's.  Ability to MD.    Cc: Vin Cohen MD    I, Leda uRiz, am scribing for and in the presence of Dr. Sharron Jane.    12/18/17  9:50 AM  Aba Hay MA   I, Dr. Sharron Jane, personally performed the services described in this documentation as scribed by   Leda Ruiz MA in my presence and it is both accurate and complete    e

## 2017-12-26 ENCOUNTER — TELEPHONE (OUTPATIENT)
Dept: PAIN MANAGEMENT | Age: 57
End: 2017-12-26

## 2017-12-26 NOTE — TELEPHONE ENCOUNTER
Following up on overdue results for the CMP ordered during his last office visit. RSM will review the need for testing with the patient at his next monthly office visit.

## 2018-01-17 ENCOUNTER — OFFICE VISIT (OUTPATIENT)
Dept: PAIN MANAGEMENT | Age: 58
End: 2018-01-17

## 2018-01-17 VITALS
WEIGHT: 211 LBS | BODY MASS INDEX: 27.09 KG/M2 | DIASTOLIC BLOOD PRESSURE: 91 MMHG | SYSTOLIC BLOOD PRESSURE: 153 MMHG | HEART RATE: 70 BPM

## 2018-01-17 DIAGNOSIS — M47.816 LUMBAR SPONDYLOSIS: ICD-10-CM

## 2018-01-17 DIAGNOSIS — M54.12 BRACHIAL NEURITIS OR RADICULITIS: ICD-10-CM

## 2018-01-17 DIAGNOSIS — M79.7 FIBROMYALGIA: ICD-10-CM

## 2018-01-17 DIAGNOSIS — M96.1 POSTLAMINECTOMY SYNDROME, LUMBAR REGION: ICD-10-CM

## 2018-01-17 DIAGNOSIS — M96.1 FAILED NECK SYNDROME: ICD-10-CM

## 2018-01-17 DIAGNOSIS — G89.4 CHRONIC PAIN SYNDROME: ICD-10-CM

## 2018-01-17 DIAGNOSIS — M51.36 DDD (DEGENERATIVE DISC DISEASE), LUMBAR: ICD-10-CM

## 2018-01-17 DIAGNOSIS — M54.16 LUMBAR RADICULITIS: ICD-10-CM

## 2018-01-17 PROCEDURE — G8484 FLU IMMUNIZE NO ADMIN: HCPCS | Performed by: INTERNAL MEDICINE

## 2018-01-17 PROCEDURE — G8427 DOCREV CUR MEDS BY ELIG CLIN: HCPCS | Performed by: INTERNAL MEDICINE

## 2018-01-17 PROCEDURE — 99213 OFFICE O/P EST LOW 20 MIN: CPT | Performed by: INTERNAL MEDICINE

## 2018-01-17 PROCEDURE — 1036F TOBACCO NON-USER: CPT | Performed by: INTERNAL MEDICINE

## 2018-01-17 PROCEDURE — G8419 CALC BMI OUT NRM PARAM NOF/U: HCPCS | Performed by: INTERNAL MEDICINE

## 2018-01-17 PROCEDURE — 3017F COLORECTAL CA SCREEN DOC REV: CPT | Performed by: INTERNAL MEDICINE

## 2018-01-17 RX ORDER — DICLOFENAC SODIUM 75 MG/1
TABLET, DELAYED RELEASE ORAL
Qty: 60 TABLET | Refills: 0 | Status: SHIPPED | OUTPATIENT
Start: 2018-01-17 | End: 2018-02-13 | Stop reason: SDUPTHER

## 2018-01-17 RX ORDER — ZOLPIDEM TARTRATE 10 MG/1
TABLET ORAL
Qty: 30 TABLET | Refills: 0 | Status: SHIPPED | OUTPATIENT
Start: 2018-01-17 | End: 2018-02-13 | Stop reason: SDUPTHER

## 2018-01-17 RX ORDER — OXYCODONE AND ACETAMINOPHEN 7.5; 325 MG/1; MG/1
1 TABLET ORAL EVERY 6 HOURS PRN
Qty: 120 TABLET | Refills: 0 | Status: SHIPPED | OUTPATIENT
Start: 2018-01-17 | End: 2018-02-13 | Stop reason: SDUPTHER

## 2018-01-17 NOTE — PROGRESS NOTES
rested in the morning. He mentions he is having problems with kidney stones. ALLERGIES: Patients list of allergies were reviewed     MEDICATIONS: Mr. Rome Rivera list of medications were reviewed. His current medications are   Outpatient Medications Prior to Visit   Medication Sig Dispense Refill    oxyCODONE-acetaminophen (PERCOCET) 7.5-325 MG per tablet Take 1 tablet by mouth every 6 hours as needed for Pain  Max  4 per day. 120 tablet 0    zolpidem (AMBIEN) 10 MG tablet TAKE ONE TABLET BY MOUTH ONCE NIGHTLY AS NEEDED FOR SLEEP. 30 tablet 0    diclofenac (VOLTAREN) 75 MG EC tablet TAKE ONE TABLET BY MOUTH TWICE A DAY 60 tablet 0    ALPRAZolam (XANAX) 0.5 MG tablet Take 0.5 mg by mouth nightly as needed.  methylPREDNISolone (MEDROL, ARYA,) 4 MG tablet Use as directed 1 kit 0     No facility-administered medications prior to visit. SOCIAL/FAMILY/PAST MEDICAL HISTORY: Mr. Samara Alexander, family and past medical history was reviewed. REVIEW OF SYSTEMS:    Respiratory: Negative for apnea, chest tightness and shortness of breath or change in baseline breathing. Gastrointestinal: Negative for nausea, vomiting, abdominal pain, diarrhea, constipation, blood in stool and abdominal distention. PHYSICAL EXAM:   Nursing note and vitals reviewed. BP (!) 153/91   Pulse 70   Wt 211 lb (95.7 kg)   BMI 27.09 kg/m²   Constitutional: He appears well-developed and well-nourished. No acute distress. Skin: Skin is warm and dry, good turgor. No rash noted. He is not diaphoretic. Cardiovascular: Normal rate, regular rhythm, normal heart sounds, and does not have murmur. Pulmonary/Chest: Effort normal. No respiratory distress. He does not have wheezes in the lung fields. He has no rales. Neurological/Psychiatric:He is alert and oriented to person, place, and time. Coordination is  normal. His mood isAppropriate and affect is Flat/blunted . IMPRESSION:   1. Chronic pain syndrome    2. Fibromyalgia    3. Postlaminectomy syndrome, lumbar region    4. DDD (degenerative disc disease), lumbar    5. Lumbar radiculitis    6. Lumbar spondylosis    7. Failed neck syndrome        PLAN:  Informed verbal consent was obtained  -Urine drug screen with GC/MS for opiates and drugs of abuse was ordered and will follow up on results.  -continue with current regimen  -ROM/Stretching exercises as advised  -he was advised proper sleep hygiene-told to avoid:use of caffeine or other stimulants after noon, alcohol use near bedtime, long or frequent naps during the day, erratic sleep schedule, heavy meals near bedtime, vigorous exercise near bedtime and use of electronic devices near bedtime  -continue with Ambien  -back stretching exercises as advised     Current Outpatient Prescriptions   Medication Sig Dispense Refill    oxyCODONE-acetaminophen (PERCOCET) 7.5-325 MG per tablet Take 1 tablet by mouth every 6 hours as needed for Pain  Max  4 per day. 120 tablet 0    zolpidem (AMBIEN) 10 MG tablet TAKE ONE TABLET BY MOUTH ONCE NIGHTLY AS NEEDED FOR SLEEP. 30 tablet 0    diclofenac (VOLTAREN) 75 MG EC tablet TAKE ONE TABLET BY MOUTH TWICE A DAY 60 tablet 0    ALPRAZolam (XANAX) 0.5 MG tablet Take 0.5 mg by mouth nightly as needed. No current facility-administered medications for this visit. I will continue his current medication regimen  which is part of the above treatment schedule. It has been helping with Mr. Prabha Gabriel chronic  medical problems which for this visit include:   Diagnoses of Chronic pain syndrome, Fibromyalgia, Postlaminectomy syndrome, lumbar region, DDD (degenerative disc disease), lumbar, Lumbar radiculitis, Lumbar spondylosis, and Failed neck syndrome were pertinent to this visit. Risks and benefits of the medications and other alternative treatments  including no treatment were discussed with the patient. The common side effects of these medications were also explained to the

## 2018-02-13 ENCOUNTER — OFFICE VISIT (OUTPATIENT)
Dept: PAIN MANAGEMENT | Age: 58
End: 2018-02-13

## 2018-02-13 VITALS
DIASTOLIC BLOOD PRESSURE: 89 MMHG | HEART RATE: 76 BPM | WEIGHT: 206 LBS | SYSTOLIC BLOOD PRESSURE: 142 MMHG | BODY MASS INDEX: 26.45 KG/M2

## 2018-02-13 DIAGNOSIS — M96.1 FAILED NECK SYNDROME: ICD-10-CM

## 2018-02-13 DIAGNOSIS — M54.12 BRACHIAL NEURITIS OR RADICULITIS: ICD-10-CM

## 2018-02-13 DIAGNOSIS — M79.7 FIBROMYALGIA: ICD-10-CM

## 2018-02-13 DIAGNOSIS — M51.36 DDD (DEGENERATIVE DISC DISEASE), LUMBAR: ICD-10-CM

## 2018-02-13 DIAGNOSIS — G89.4 CHRONIC PAIN SYNDROME: ICD-10-CM

## 2018-02-13 DIAGNOSIS — M47.816 LUMBAR SPONDYLOSIS: ICD-10-CM

## 2018-02-13 DIAGNOSIS — M54.16 LUMBAR RADICULITIS: ICD-10-CM

## 2018-02-13 DIAGNOSIS — M96.1 POSTLAMINECTOMY SYNDROME, LUMBAR REGION: ICD-10-CM

## 2018-02-13 PROCEDURE — G8427 DOCREV CUR MEDS BY ELIG CLIN: HCPCS | Performed by: INTERNAL MEDICINE

## 2018-02-13 PROCEDURE — G8419 CALC BMI OUT NRM PARAM NOF/U: HCPCS | Performed by: INTERNAL MEDICINE

## 2018-02-13 PROCEDURE — G8484 FLU IMMUNIZE NO ADMIN: HCPCS | Performed by: INTERNAL MEDICINE

## 2018-02-13 PROCEDURE — 3017F COLORECTAL CA SCREEN DOC REV: CPT | Performed by: INTERNAL MEDICINE

## 2018-02-13 PROCEDURE — 1036F TOBACCO NON-USER: CPT | Performed by: INTERNAL MEDICINE

## 2018-02-13 PROCEDURE — 99213 OFFICE O/P EST LOW 20 MIN: CPT | Performed by: INTERNAL MEDICINE

## 2018-02-13 RX ORDER — OXYCODONE AND ACETAMINOPHEN 7.5; 325 MG/1; MG/1
1 TABLET ORAL EVERY 6 HOURS PRN
Qty: 120 TABLET | Refills: 0 | Status: SHIPPED | OUTPATIENT
Start: 2018-02-13 | End: 2018-03-13 | Stop reason: SDUPTHER

## 2018-02-13 RX ORDER — DICLOFENAC SODIUM 75 MG/1
TABLET, DELAYED RELEASE ORAL
Qty: 60 TABLET | Refills: 0 | Status: SHIPPED | OUTPATIENT
Start: 2018-02-13 | End: 2018-03-13 | Stop reason: SDUPTHER

## 2018-02-13 RX ORDER — ZOLPIDEM TARTRATE 10 MG/1
TABLET ORAL
Qty: 30 TABLET | Refills: 0 | Status: SHIPPED | OUTPATIENT
Start: 2018-02-13 | End: 2018-03-13 | Stop reason: SDUPTHER

## 2018-02-13 RX ORDER — METHYLPREDNISOLONE 4 MG/1
TABLET ORAL
Qty: 1 KIT | Refills: 0 | Status: SHIPPED | OUTPATIENT
Start: 2018-02-13 | End: 2018-03-13

## 2018-02-13 NOTE — PROGRESS NOTES
proper sleep hygiene-told to avoid:use of caffeine or other stimulants after noon, alcohol use near bedtime, long or frequent naps during the day, erratic sleep schedule, heavy meals near bedtime, vigorous exercise near bedtime and use of electronic devices near bedtime   -He was advised to increase fluids ( 5-7  glasses of fluid daily), limit caffeine, avoid cheese products, increase dietary fiber, increase activity and exercise as tolerated and relax regularly and enjoy meals   -Patient's urine drug screen results with GC/MS confirmation were obtained and reviewed and were negative for any illicit drugs. Prescribed medications were within acceptable range.    -Will order Medrol pack   -Wants to hold on it in case of pain flares up then will start it. Current Outpatient Prescriptions   Medication Sig Dispense Refill    oxyCODONE-acetaminophen (PERCOCET) 7.5-325 MG per tablet Take 1 tablet by mouth every 6 hours as needed for Pain for up to 28 days Max  4 per day. 120 tablet 0    zolpidem (AMBIEN) 10 MG tablet TAKE ONE TABLET BY MOUTH ONCE NIGHTLY AS NEEDED FOR SLEEP. 30 tablet 0    diclofenac (VOLTAREN) 75 MG EC tablet TAKE ONE TABLET BY MOUTH TWICE A DAY 60 tablet 0    ALPRAZolam (XANAX) 0.5 MG tablet Take 0.5 mg by mouth nightly as needed. No current facility-administered medications for this visit. I will continue his current medication regimen  which is part of the above treatment schedule. It has been helping with Mr. Naa Berman chronic  medical problems which for this visit include:   Diagnoses of Chronic pain syndrome, Postlaminectomy syndrome, lumbar region, Fibromyalgia, Primary insomnia, Brachial neuritis or radiculitis, DDD (degenerative disc disease), lumbar, Lumbar radiculitis, Lumbar spondylosis, and Failed neck syndrome were pertinent to this visit. Risks and benefits of the medications and other alternative treatments  including no treatment were discussed with the patient. The

## 2018-03-13 ENCOUNTER — OFFICE VISIT (OUTPATIENT)
Dept: PAIN MANAGEMENT | Age: 58
End: 2018-03-13

## 2018-03-13 VITALS
HEART RATE: 83 BPM | WEIGHT: 205 LBS | SYSTOLIC BLOOD PRESSURE: 124 MMHG | BODY MASS INDEX: 26.32 KG/M2 | DIASTOLIC BLOOD PRESSURE: 83 MMHG

## 2018-03-13 DIAGNOSIS — M54.12 BRACHIAL NEURITIS OR RADICULITIS: ICD-10-CM

## 2018-03-13 DIAGNOSIS — M96.1 FAILED NECK SYNDROME: ICD-10-CM

## 2018-03-13 DIAGNOSIS — M47.816 LUMBAR SPONDYLOSIS: ICD-10-CM

## 2018-03-13 DIAGNOSIS — M79.7 FIBROMYALGIA: ICD-10-CM

## 2018-03-13 DIAGNOSIS — G89.4 CHRONIC PAIN SYNDROME: ICD-10-CM

## 2018-03-13 DIAGNOSIS — M96.1 POSTLAMINECTOMY SYNDROME, LUMBAR REGION: ICD-10-CM

## 2018-03-13 DIAGNOSIS — M51.36 DDD (DEGENERATIVE DISC DISEASE), LUMBAR: ICD-10-CM

## 2018-03-13 DIAGNOSIS — M54.16 LUMBAR RADICULITIS: ICD-10-CM

## 2018-03-13 PROCEDURE — G8419 CALC BMI OUT NRM PARAM NOF/U: HCPCS | Performed by: INTERNAL MEDICINE

## 2018-03-13 PROCEDURE — 1036F TOBACCO NON-USER: CPT | Performed by: INTERNAL MEDICINE

## 2018-03-13 PROCEDURE — 3017F COLORECTAL CA SCREEN DOC REV: CPT | Performed by: INTERNAL MEDICINE

## 2018-03-13 PROCEDURE — G8484 FLU IMMUNIZE NO ADMIN: HCPCS | Performed by: INTERNAL MEDICINE

## 2018-03-13 PROCEDURE — G8427 DOCREV CUR MEDS BY ELIG CLIN: HCPCS | Performed by: INTERNAL MEDICINE

## 2018-03-13 PROCEDURE — 99213 OFFICE O/P EST LOW 20 MIN: CPT | Performed by: INTERNAL MEDICINE

## 2018-03-13 RX ORDER — ZOLPIDEM TARTRATE 10 MG/1
TABLET ORAL
Qty: 30 TABLET | Refills: 0 | Status: SHIPPED | OUTPATIENT
Start: 2018-03-13 | End: 2018-04-10 | Stop reason: SDUPTHER

## 2018-03-13 RX ORDER — DICLOFENAC SODIUM 75 MG/1
TABLET, DELAYED RELEASE ORAL
Qty: 60 TABLET | Refills: 0 | Status: SHIPPED | OUTPATIENT
Start: 2018-03-13 | End: 2018-05-10 | Stop reason: SDUPTHER

## 2018-03-13 RX ORDER — OXYCODONE AND ACETAMINOPHEN 7.5; 325 MG/1; MG/1
1 TABLET ORAL EVERY 6 HOURS PRN
Qty: 120 TABLET | Refills: 0 | Status: SHIPPED | OUTPATIENT
Start: 2018-03-13 | End: 2018-04-10 | Stop reason: SDUPTHER

## 2018-03-13 NOTE — PROGRESS NOTES
Ludin Sanchez  1960  [id number]    HISTORY OF PRESENT ILLNESS:  Mr. Madison Holstein is a 65 y.o. male returns for a follow up visit for multiple medical problems. His current presenting problems are   1. Chronic pain syndrome    2. Fibromyalgia    3. Postlaminectomy syndrome, lumbar region    4. DDD (degenerative disc disease), lumbar    5. Lumbar radiculitis    6. Lumbar spondylosis    7. Failed neck syndrome    . As per information/history obtained from the PADT(patient assessment and documentation tool) - He complains of pain in the neck, shoulders Bilateral, upper back, mid back and lower back with radiation to the hands Left, buttocks and feet Left He rates the pain 4/10 and describes it as aching, throbbing. Pain is made worse by: nothing, movement, walking, standing, sitting, bending, lifting. Current treatment regimen has helped relieve about 60% of the pain. He denies side effects from the current pain regimen. Patient reports that since the last follow up visit the physical functioning is unchanged, family/social relationships are unchanged, mood is unchanged and sleep patterns are unchanged, and that the overall functioning is unchanged. Patient denies neurological bowel or bladder. Patient denies misusing/abusing his narcotic pain medications or using any illegal drugs. There are No indicators for possible drug abuse, addiction or diversion problems. Upon obtaining the medical history from Mr. Madison Holstein regarding today's office visit for his presenting problems, patient states his pain has been baseline, tolerable somewhat. Mr. Madison Holstein states he is using his Percocet 4 per day. He denies any constipation symptoms. He mentions his neck is worse than his back. Patient states his sleep is fair. Has fairly normal sleep latency. Averages about 4-6 hours of sleep a night. Denies any signs of sleep apnea. Feels somewhat rested in the morning, using Ambien which is helping.  Patient says he is working inadvertently. Thank you for allowing me to participate in the care of this patient. Zain Gamboa MD.    Cc: Babita aLnd MD    I, Josefina Clemente, scribing for in the presence  of Dr. Zain Gamboa.   03/13/18  9:37 AM  Bonifacio Amor.  Jennyfer Adorno Assistant  I, Dr. Zain Gamboa, personally performed the services described in this documentation as scribed by  Josefina Clemente MA in my presence and it is both accurate and complete

## 2018-04-10 ENCOUNTER — OFFICE VISIT (OUTPATIENT)
Dept: PAIN MANAGEMENT | Age: 58
End: 2018-04-10

## 2018-04-10 VITALS
HEART RATE: 72 BPM | SYSTOLIC BLOOD PRESSURE: 139 MMHG | WEIGHT: 206 LBS | BODY MASS INDEX: 26.45 KG/M2 | DIASTOLIC BLOOD PRESSURE: 88 MMHG

## 2018-04-10 DIAGNOSIS — G89.4 CHRONIC PAIN SYNDROME: ICD-10-CM

## 2018-04-10 DIAGNOSIS — M51.36 DDD (DEGENERATIVE DISC DISEASE), LUMBAR: ICD-10-CM

## 2018-04-10 DIAGNOSIS — M79.7 FIBROMYALGIA: ICD-10-CM

## 2018-04-10 DIAGNOSIS — M96.1 POSTLAMINECTOMY SYNDROME, LUMBAR REGION: ICD-10-CM

## 2018-04-10 DIAGNOSIS — M54.16 LUMBAR RADICULITIS: ICD-10-CM

## 2018-04-10 DIAGNOSIS — M47.816 LUMBAR SPONDYLOSIS: ICD-10-CM

## 2018-04-10 DIAGNOSIS — M96.1 FAILED NECK SYNDROME: ICD-10-CM

## 2018-04-10 PROCEDURE — 1036F TOBACCO NON-USER: CPT | Performed by: INTERNAL MEDICINE

## 2018-04-10 PROCEDURE — 99213 OFFICE O/P EST LOW 20 MIN: CPT | Performed by: INTERNAL MEDICINE

## 2018-04-10 PROCEDURE — G8427 DOCREV CUR MEDS BY ELIG CLIN: HCPCS | Performed by: INTERNAL MEDICINE

## 2018-04-10 PROCEDURE — G8419 CALC BMI OUT NRM PARAM NOF/U: HCPCS | Performed by: INTERNAL MEDICINE

## 2018-04-10 PROCEDURE — 3017F COLORECTAL CA SCREEN DOC REV: CPT | Performed by: INTERNAL MEDICINE

## 2018-04-10 RX ORDER — ZOLPIDEM TARTRATE 10 MG/1
TABLET ORAL
Qty: 30 TABLET | Refills: 0 | Status: SHIPPED | OUTPATIENT
Start: 2018-04-10 | End: 2018-05-10 | Stop reason: SDUPTHER

## 2018-04-10 RX ORDER — OXYCODONE AND ACETAMINOPHEN 7.5; 325 MG/1; MG/1
1 TABLET ORAL EVERY 6 HOURS PRN
Qty: 120 TABLET | Refills: 0 | Status: SHIPPED | OUTPATIENT
Start: 2018-04-10 | End: 2018-05-10 | Stop reason: SDUPTHER

## 2018-05-10 ENCOUNTER — OFFICE VISIT (OUTPATIENT)
Dept: PAIN MANAGEMENT | Age: 58
End: 2018-05-10

## 2018-05-10 VITALS
HEART RATE: 70 BPM | SYSTOLIC BLOOD PRESSURE: 137 MMHG | BODY MASS INDEX: 26.45 KG/M2 | WEIGHT: 206 LBS | DIASTOLIC BLOOD PRESSURE: 88 MMHG

## 2018-05-10 DIAGNOSIS — M96.1 POSTLAMINECTOMY SYNDROME, LUMBAR REGION: ICD-10-CM

## 2018-05-10 DIAGNOSIS — F51.01 PRIMARY INSOMNIA: ICD-10-CM

## 2018-05-10 DIAGNOSIS — G89.4 CHRONIC PAIN SYNDROME: ICD-10-CM

## 2018-05-10 DIAGNOSIS — M79.7 FIBROMYALGIA: ICD-10-CM

## 2018-05-10 DIAGNOSIS — M51.36 DDD (DEGENERATIVE DISC DISEASE), LUMBAR: ICD-10-CM

## 2018-05-10 DIAGNOSIS — M54.16 LUMBAR RADICULITIS: ICD-10-CM

## 2018-05-10 DIAGNOSIS — M54.12 BRACHIAL NEURITIS OR RADICULITIS: ICD-10-CM

## 2018-05-10 DIAGNOSIS — M96.1 FAILED NECK SYNDROME: ICD-10-CM

## 2018-05-10 PROCEDURE — 99214 OFFICE O/P EST MOD 30 MIN: CPT | Performed by: INTERNAL MEDICINE

## 2018-05-10 PROCEDURE — G8427 DOCREV CUR MEDS BY ELIG CLIN: HCPCS | Performed by: INTERNAL MEDICINE

## 2018-05-10 PROCEDURE — 1036F TOBACCO NON-USER: CPT | Performed by: INTERNAL MEDICINE

## 2018-05-10 PROCEDURE — 3017F COLORECTAL CA SCREEN DOC REV: CPT | Performed by: INTERNAL MEDICINE

## 2018-05-10 PROCEDURE — G8419 CALC BMI OUT NRM PARAM NOF/U: HCPCS | Performed by: INTERNAL MEDICINE

## 2018-05-10 RX ORDER — DICLOFENAC SODIUM 75 MG/1
TABLET, DELAYED RELEASE ORAL
Qty: 60 TABLET | Refills: 0 | Status: SHIPPED | OUTPATIENT
Start: 2018-05-10 | End: 2018-06-12 | Stop reason: SDUPTHER

## 2018-05-10 RX ORDER — ZOLPIDEM TARTRATE 10 MG/1
TABLET ORAL
Qty: 30 TABLET | Refills: 0 | Status: SHIPPED | OUTPATIENT
Start: 2018-05-10 | End: 2018-06-12 | Stop reason: SDUPTHER

## 2018-05-10 RX ORDER — OXYCODONE AND ACETAMINOPHEN 7.5; 325 MG/1; MG/1
1 TABLET ORAL EVERY 6 HOURS PRN
Qty: 120 TABLET | Refills: 0 | Status: SHIPPED | OUTPATIENT
Start: 2018-05-10 | End: 2018-06-12 | Stop reason: SDUPTHER

## 2018-05-10 RX ORDER — PREGABALIN 75 MG/1
CAPSULE ORAL
Qty: 90 CAPSULE | Refills: 0 | Status: SHIPPED | OUTPATIENT
Start: 2018-05-10 | End: 2018-05-15 | Stop reason: ALTCHOICE

## 2018-05-15 ENCOUNTER — TELEPHONE (OUTPATIENT)
Dept: PAIN MANAGEMENT | Age: 58
End: 2018-05-15

## 2018-05-15 DIAGNOSIS — M47.816 LUMBAR SPONDYLOSIS: ICD-10-CM

## 2018-05-15 DIAGNOSIS — M96.1 FAILED NECK SYNDROME: ICD-10-CM

## 2018-05-15 DIAGNOSIS — M54.16 LUMBAR RADICULITIS: ICD-10-CM

## 2018-05-15 DIAGNOSIS — M96.1 POSTLAMINECTOMY SYNDROME, LUMBAR REGION: ICD-10-CM

## 2018-05-15 DIAGNOSIS — M79.7 FIBROMYALGIA: ICD-10-CM

## 2018-05-15 DIAGNOSIS — M51.36 DDD (DEGENERATIVE DISC DISEASE), LUMBAR: ICD-10-CM

## 2018-05-15 DIAGNOSIS — G89.4 CHRONIC PAIN SYNDROME: ICD-10-CM

## 2018-05-15 RX ORDER — GABAPENTIN 600 MG/1
TABLET ORAL
Qty: 90 TABLET | Refills: 0 | Status: SHIPPED | OUTPATIENT
Start: 2018-05-15 | End: 2018-06-12 | Stop reason: SDUPTHER

## 2018-06-12 ENCOUNTER — OFFICE VISIT (OUTPATIENT)
Dept: PAIN MANAGEMENT | Age: 58
End: 2018-06-12

## 2018-06-12 VITALS
HEART RATE: 80 BPM | SYSTOLIC BLOOD PRESSURE: 135 MMHG | DIASTOLIC BLOOD PRESSURE: 85 MMHG | WEIGHT: 205 LBS | BODY MASS INDEX: 26.32 KG/M2

## 2018-06-12 DIAGNOSIS — G89.4 CHRONIC PAIN SYNDROME: ICD-10-CM

## 2018-06-12 DIAGNOSIS — M79.7 FIBROMYALGIA: ICD-10-CM

## 2018-06-12 DIAGNOSIS — F51.01 PRIMARY INSOMNIA: ICD-10-CM

## 2018-06-12 DIAGNOSIS — M47.816 LUMBAR SPONDYLOSIS: ICD-10-CM

## 2018-06-12 DIAGNOSIS — M96.1 POSTLAMINECTOMY SYNDROME, LUMBAR REGION: ICD-10-CM

## 2018-06-12 DIAGNOSIS — M54.16 LUMBAR RADICULITIS: ICD-10-CM

## 2018-06-12 DIAGNOSIS — M96.1 FAILED NECK SYNDROME: ICD-10-CM

## 2018-06-12 DIAGNOSIS — M51.36 DDD (DEGENERATIVE DISC DISEASE), LUMBAR: ICD-10-CM

## 2018-06-12 DIAGNOSIS — M54.12 BRACHIAL NEURITIS OR RADICULITIS: ICD-10-CM

## 2018-06-12 PROCEDURE — 1036F TOBACCO NON-USER: CPT | Performed by: INTERNAL MEDICINE

## 2018-06-12 PROCEDURE — 99213 OFFICE O/P EST LOW 20 MIN: CPT | Performed by: INTERNAL MEDICINE

## 2018-06-12 PROCEDURE — G8419 CALC BMI OUT NRM PARAM NOF/U: HCPCS | Performed by: INTERNAL MEDICINE

## 2018-06-12 PROCEDURE — G8427 DOCREV CUR MEDS BY ELIG CLIN: HCPCS | Performed by: INTERNAL MEDICINE

## 2018-06-12 PROCEDURE — 3017F COLORECTAL CA SCREEN DOC REV: CPT | Performed by: INTERNAL MEDICINE

## 2018-06-12 RX ORDER — ZOLPIDEM TARTRATE 10 MG/1
TABLET ORAL
Qty: 30 TABLET | Refills: 0 | Status: SHIPPED | OUTPATIENT
Start: 2018-06-12 | End: 2018-07-12 | Stop reason: SDUPTHER

## 2018-06-12 RX ORDER — OXYCODONE AND ACETAMINOPHEN 7.5; 325 MG/1; MG/1
1 TABLET ORAL EVERY 6 HOURS PRN
Qty: 120 TABLET | Refills: 0 | Status: SHIPPED | OUTPATIENT
Start: 2018-06-12 | End: 2018-07-12 | Stop reason: SDUPTHER

## 2018-06-12 RX ORDER — GABAPENTIN 600 MG/1
TABLET ORAL
Qty: 90 TABLET | Refills: 0 | Status: SHIPPED | OUTPATIENT
Start: 2018-06-12 | End: 2018-07-12 | Stop reason: SDUPTHER

## 2018-06-12 RX ORDER — DICLOFENAC SODIUM 75 MG/1
TABLET, DELAYED RELEASE ORAL
Qty: 60 TABLET | Refills: 0 | Status: SHIPPED | OUTPATIENT
Start: 2018-06-12 | End: 2018-07-12 | Stop reason: SDUPTHER

## 2018-07-12 ENCOUNTER — OFFICE VISIT (OUTPATIENT)
Dept: PAIN MANAGEMENT | Age: 58
End: 2018-07-12

## 2018-07-12 VITALS
SYSTOLIC BLOOD PRESSURE: 137 MMHG | HEART RATE: 87 BPM | DIASTOLIC BLOOD PRESSURE: 83 MMHG | WEIGHT: 208 LBS | BODY MASS INDEX: 26.71 KG/M2

## 2018-07-12 DIAGNOSIS — M51.36 DDD (DEGENERATIVE DISC DISEASE), LUMBAR: ICD-10-CM

## 2018-07-12 DIAGNOSIS — F51.01 PRIMARY INSOMNIA: ICD-10-CM

## 2018-07-12 DIAGNOSIS — M96.1 FAILED NECK SYNDROME: ICD-10-CM

## 2018-07-12 DIAGNOSIS — M47.816 LUMBAR SPONDYLOSIS: ICD-10-CM

## 2018-07-12 DIAGNOSIS — M96.1 POSTLAMINECTOMY SYNDROME, LUMBAR REGION: ICD-10-CM

## 2018-07-12 DIAGNOSIS — M54.12 BRACHIAL NEURITIS OR RADICULITIS: ICD-10-CM

## 2018-07-12 DIAGNOSIS — M79.7 FIBROMYALGIA: ICD-10-CM

## 2018-07-12 DIAGNOSIS — M54.16 LUMBAR RADICULITIS: ICD-10-CM

## 2018-07-12 DIAGNOSIS — G89.4 CHRONIC PAIN SYNDROME: ICD-10-CM

## 2018-07-12 PROCEDURE — 99213 OFFICE O/P EST LOW 20 MIN: CPT | Performed by: INTERNAL MEDICINE

## 2018-07-12 PROCEDURE — 1036F TOBACCO NON-USER: CPT | Performed by: INTERNAL MEDICINE

## 2018-07-12 PROCEDURE — G8427 DOCREV CUR MEDS BY ELIG CLIN: HCPCS | Performed by: INTERNAL MEDICINE

## 2018-07-12 PROCEDURE — G8419 CALC BMI OUT NRM PARAM NOF/U: HCPCS | Performed by: INTERNAL MEDICINE

## 2018-07-12 PROCEDURE — 3017F COLORECTAL CA SCREEN DOC REV: CPT | Performed by: INTERNAL MEDICINE

## 2018-07-12 RX ORDER — DICLOFENAC SODIUM 75 MG/1
TABLET, DELAYED RELEASE ORAL
Qty: 60 TABLET | Refills: 1 | Status: SHIPPED | OUTPATIENT
Start: 2018-07-12 | End: 2018-08-14 | Stop reason: SDUPTHER

## 2018-07-12 RX ORDER — GABAPENTIN 600 MG/1
TABLET ORAL
Qty: 90 TABLET | Refills: 1 | Status: SHIPPED | OUTPATIENT
Start: 2018-07-12 | End: 2018-08-14 | Stop reason: SDUPTHER

## 2018-07-12 RX ORDER — ZOLPIDEM TARTRATE 10 MG/1
TABLET ORAL
Qty: 30 TABLET | Refills: 0 | Status: SHIPPED | OUTPATIENT
Start: 2018-07-12 | End: 2018-08-14 | Stop reason: SDUPTHER

## 2018-07-12 RX ORDER — OXYCODONE AND ACETAMINOPHEN 7.5; 325 MG/1; MG/1
1 TABLET ORAL EVERY 6 HOURS PRN
Qty: 128 TABLET | Refills: 0 | Status: SHIPPED | OUTPATIENT
Start: 2018-07-12 | End: 2018-08-13

## 2018-07-12 NOTE — PROGRESS NOTES
(degenerative disc disease), lumbar    5. Lumbar radiculitis    6. Lumbar spondylosis    7. Failed neck syndrome    8. Postlaminectomy syndrome, lumbar region        PLAN:  Informed verbal consent was obtained  -Continue with current regimen   -ROM/stretching exercises   -he was advised proper sleep hygiene-told to avoid:use of caffeine or other stimulants after noon, alcohol use near bedtime, long or frequent naps during the day, erratic sleep schedule, heavy meals near bedtime, vigorous exercise near bedtime and use of electronic devices near bedtime   -Continue with Ambien   -Continue with Percocet 4 per day    Current Outpatient Prescriptions   Medication Sig Dispense Refill    diclofenac (VOLTAREN) 75 MG EC tablet TAKE ONE TABLET BY MOUTH TWICE A DAY 60 tablet 0    oxyCODONE-acetaminophen (PERCOCET) 7.5-325 MG per tablet Take 1 tablet by mouth every 6 hours as needed for Pain for up to 30 days. Max  4 per day. 120 tablet 0    zolpidem (AMBIEN) 10 MG tablet TAKE ONE TABLET BY MOUTH ONCE NIGHTLY AS NEEDED FOR SLEEP. 30 tablet 0    ALPRAZolam (XANAX) 0.5 MG tablet Take 0.5 mg by mouth nightly as needed.  gabapentin (NEURONTIN) 600 MG tablet One tab hs 1 week, 2 tabs hs 1 week, 1 tab am 2 tabs pm. 90 tablet 0     No current facility-administered medications for this visit. I will continue his current medication regimen  which is part of the above treatment schedule. It has been helping with Mr. Jordana Al chronic  medical problems which for this visit include:   Diagnoses of Chronic pain syndrome, Fibromyalgia, Primary insomnia, DDD (degenerative disc disease), lumbar, Lumbar radiculitis, Lumbar spondylosis, Failed neck syndrome, and Postlaminectomy syndrome, lumbar region were pertinent to this visit. Risks and benefits of the medications and other alternative treatments  including no treatment were discussed with the patient. The common side effects of these medications were also explained to the patient. Informed verbal consent was obtained. Goals of current treatment regimen include improvement in pain, restoration of functioning- with focus on improvement in physical performance, general activity, work or disability,emotional distress, health care utilization and  decreased medication consumption. Will continue to monitor progress towards achieving/maintaining therapeutic goals with special emphasis on  1. Improvement in perceived interfernce  of pain with ADL's. Ability to do home exercises independently. Ability to do household chores indoor and/or outdoor work and social and leisure activities. Improve psychosocial and physical functioning. - he is showing progression towards this treatment goal with the current regimen. He was advised against drinking alcohol with the narcotic pain medicines, advised against driving or handling machinery while adjusting the dose of medicines or if having cognitive  issues related to the current medications. Risk of overdose and death, if medicines not taken as prescribed, were also discussed. If the patient develops new symptoms or if the symptoms worsen, the patient should call the office. While transcribing every attempt was made to maintain the accuracy of the note in terms of it's contents,there may have been some errors made inadvertently. Thank you for allowing me to participate in the care of this patient.     Erickson Shore MD.    Cc: Ann Bamberger, MD I, Abbott Sever, am scribing for and in the presence of Dr. Erickson Shore.   07/12/18  5:48 PM  Abbott Sever, MA I, Dr. Erickson Shore, personally performed the services described in this documentation as scribed by   Abbott Sever ,MA in my presence and it is both accurate and complete

## 2018-08-14 ENCOUNTER — OFFICE VISIT (OUTPATIENT)
Dept: PAIN MANAGEMENT | Age: 58
End: 2018-08-14

## 2018-08-14 VITALS
DIASTOLIC BLOOD PRESSURE: 84 MMHG | HEIGHT: 74 IN | SYSTOLIC BLOOD PRESSURE: 134 MMHG | HEART RATE: 82 BPM | WEIGHT: 208 LBS | BODY MASS INDEX: 26.69 KG/M2

## 2018-08-14 DIAGNOSIS — M54.12 BRACHIAL NEURITIS OR RADICULITIS: ICD-10-CM

## 2018-08-14 DIAGNOSIS — M51.36 DDD (DEGENERATIVE DISC DISEASE), LUMBAR: ICD-10-CM

## 2018-08-14 DIAGNOSIS — F51.01 PRIMARY INSOMNIA: ICD-10-CM

## 2018-08-14 DIAGNOSIS — M47.816 LUMBAR SPONDYLOSIS: ICD-10-CM

## 2018-08-14 DIAGNOSIS — M79.7 FIBROMYALGIA: ICD-10-CM

## 2018-08-14 DIAGNOSIS — M96.1 FAILED NECK SYNDROME: ICD-10-CM

## 2018-08-14 DIAGNOSIS — G89.4 CHRONIC PAIN SYNDROME: ICD-10-CM

## 2018-08-14 DIAGNOSIS — M96.1 POSTLAMINECTOMY SYNDROME, LUMBAR REGION: ICD-10-CM

## 2018-08-14 DIAGNOSIS — M54.16 LUMBAR RADICULITIS: ICD-10-CM

## 2018-08-14 PROCEDURE — 99213 OFFICE O/P EST LOW 20 MIN: CPT | Performed by: INTERNAL MEDICINE

## 2018-08-14 PROCEDURE — 3017F COLORECTAL CA SCREEN DOC REV: CPT | Performed by: INTERNAL MEDICINE

## 2018-08-14 PROCEDURE — G8419 CALC BMI OUT NRM PARAM NOF/U: HCPCS | Performed by: INTERNAL MEDICINE

## 2018-08-14 PROCEDURE — 1036F TOBACCO NON-USER: CPT | Performed by: INTERNAL MEDICINE

## 2018-08-14 PROCEDURE — G8427 DOCREV CUR MEDS BY ELIG CLIN: HCPCS | Performed by: INTERNAL MEDICINE

## 2018-08-14 RX ORDER — GABAPENTIN 600 MG/1
TABLET ORAL
Qty: 90 TABLET | Refills: 1 | Status: SHIPPED | OUTPATIENT
Start: 2018-08-14 | End: 2018-08-16 | Stop reason: SDUPTHER

## 2018-08-14 RX ORDER — ZOLPIDEM TARTRATE 10 MG/1
TABLET ORAL
Qty: 30 TABLET | Refills: 0 | Status: SHIPPED | OUTPATIENT
Start: 2018-08-14 | End: 2018-09-11 | Stop reason: SDUPTHER

## 2018-08-14 RX ORDER — ZOLPIDEM TARTRATE 10 MG/1
TABLET ORAL
COMMUNITY
Start: 2018-05-14 | End: 2018-08-14 | Stop reason: ALTCHOICE

## 2018-08-14 RX ORDER — OXYCODONE AND ACETAMINOPHEN 7.5; 325 MG/1; MG/1
1 TABLET ORAL EVERY 6 HOURS PRN
Qty: 120 TABLET | Refills: 0 | Status: SHIPPED | OUTPATIENT
Start: 2018-08-14 | End: 2018-09-11 | Stop reason: SDUPTHER

## 2018-08-14 RX ORDER — DICLOFENAC SODIUM 75 MG/1
TABLET, DELAYED RELEASE ORAL
Qty: 60 TABLET | Refills: 1 | Status: SHIPPED | OUTPATIENT
Start: 2018-08-14 | End: 2018-08-16 | Stop reason: SDUPTHER

## 2018-08-14 RX ORDER — OXYCODONE AND ACETAMINOPHEN 7.5; 325 MG/1; MG/1
TABLET ORAL
COMMUNITY
Start: 2018-05-14 | End: 2018-08-14 | Stop reason: SDUPTHER

## 2018-08-14 NOTE — PROGRESS NOTES
Mary Kay Conner  1960  [id number]    HISTORY OF PRESENT ILLNESS:  Mr. Gerardo Pugh is a 63 y.o. male returns for a follow up visit for multiple medical problems. His current presenting problems are   1. Chronic pain syndrome    2. Postlaminectomy syndrome, lumbar region    3. Brachial neuritis or radiculitis    4. Fibromyalgia    5. DDD (degenerative disc disease), lumbar    6. Lumbar radiculitis    7. Lumbar spondylosis    8. Failed neck syndrome    . As per information/history obtained from the PADT(patient assessment and documentation tool) - He complains of pain in the neck, upper back and lower back with radiation to the hands Left, buttocks and feet Left He rates the pain 4/10 and describes it as sharp, aching. Pain is made worse by: movement, walking, standing, sitting, bending, lifting. Current treatment regimen has helped relieve about 60% of the pain. He denies side effects from the current pain regimen. Patient reports that since the last follow up visit the physical functioning is unchanged, family/social relationships are unchanged, mood is unchanged and sleep patterns are unchanged, and that the overall functioning is unchanged. Patient denies neurological bowel or bladder. Patient denies misusing/abusing his narcotic pain medications or using any illegal drugs. There are No indicators for possible drug abuse, addiction or diversion problems. Upon obtaining the medical history from Mr. Gerardo Pugh regarding today's office visit for his presenting problems, Patient states he has been doing fair, managing with the medications. He states he was on vacation recently. Mr. Gerardo Pugh mentions using Percocet 4 times per day. He mentions the neck and back pain is baseline. ALLERGIES: Patients list of allergies were reviewed     MEDICATIONS: Mr. Gerardo Pugh list of medications were reviewed. His current medications are   Outpatient Medications Prior to Visit   Medication Sig Dispense Refill   

## 2018-08-16 ENCOUNTER — TELEPHONE (OUTPATIENT)
Dept: PAIN MANAGEMENT | Age: 58
End: 2018-08-16

## 2018-08-16 DIAGNOSIS — M51.36 DDD (DEGENERATIVE DISC DISEASE), LUMBAR: ICD-10-CM

## 2018-08-16 DIAGNOSIS — M96.1 FAILED NECK SYNDROME: ICD-10-CM

## 2018-08-16 DIAGNOSIS — M54.12 BRACHIAL NEURITIS OR RADICULITIS: ICD-10-CM

## 2018-08-16 DIAGNOSIS — M47.816 LUMBAR SPONDYLOSIS: ICD-10-CM

## 2018-08-16 DIAGNOSIS — M79.7 FIBROMYALGIA: ICD-10-CM

## 2018-08-16 DIAGNOSIS — M96.1 POSTLAMINECTOMY SYNDROME, LUMBAR REGION: ICD-10-CM

## 2018-08-16 DIAGNOSIS — G89.4 CHRONIC PAIN SYNDROME: ICD-10-CM

## 2018-08-16 DIAGNOSIS — M54.16 LUMBAR RADICULITIS: ICD-10-CM

## 2018-08-16 RX ORDER — DICLOFENAC SODIUM 75 MG/1
TABLET, DELAYED RELEASE ORAL
Qty: 60 TABLET | Refills: 1 | Status: SHIPPED | OUTPATIENT
Start: 2018-08-16 | End: 2018-09-11 | Stop reason: SDUPTHER

## 2018-08-16 RX ORDER — GABAPENTIN 600 MG/1
TABLET ORAL
Qty: 90 TABLET | Refills: 1 | Status: SHIPPED | OUTPATIENT
Start: 2018-08-16 | End: 2018-09-11 | Stop reason: SDUPTHER

## 2018-08-16 NOTE — TELEPHONE ENCOUNTER
Pt calling, states his scripts went to [facility] but they are supposed to go to [facility] [place]. He wants them transferred. This was done.

## 2018-09-11 ENCOUNTER — OFFICE VISIT (OUTPATIENT)
Dept: PAIN MANAGEMENT | Age: 58
End: 2018-09-11

## 2018-09-11 VITALS
HEART RATE: 78 BPM | WEIGHT: 205 LBS | BODY MASS INDEX: 26.32 KG/M2 | SYSTOLIC BLOOD PRESSURE: 134 MMHG | DIASTOLIC BLOOD PRESSURE: 71 MMHG

## 2018-09-11 DIAGNOSIS — M54.12 BRACHIAL NEURITIS OR RADICULITIS: ICD-10-CM

## 2018-09-11 DIAGNOSIS — G89.4 CHRONIC PAIN SYNDROME: ICD-10-CM

## 2018-09-11 DIAGNOSIS — M96.1 FAILED NECK SYNDROME: ICD-10-CM

## 2018-09-11 DIAGNOSIS — M47.816 LUMBAR SPONDYLOSIS: ICD-10-CM

## 2018-09-11 DIAGNOSIS — F51.01 PRIMARY INSOMNIA: ICD-10-CM

## 2018-09-11 DIAGNOSIS — M51.36 DDD (DEGENERATIVE DISC DISEASE), LUMBAR: ICD-10-CM

## 2018-09-11 DIAGNOSIS — M79.7 FIBROMYALGIA: ICD-10-CM

## 2018-09-11 DIAGNOSIS — M96.1 POSTLAMINECTOMY SYNDROME, LUMBAR REGION: ICD-10-CM

## 2018-09-11 DIAGNOSIS — M54.16 LUMBAR RADICULITIS: ICD-10-CM

## 2018-09-11 PROCEDURE — 1036F TOBACCO NON-USER: CPT | Performed by: INTERNAL MEDICINE

## 2018-09-11 PROCEDURE — 99213 OFFICE O/P EST LOW 20 MIN: CPT | Performed by: INTERNAL MEDICINE

## 2018-09-11 PROCEDURE — G8419 CALC BMI OUT NRM PARAM NOF/U: HCPCS | Performed by: INTERNAL MEDICINE

## 2018-09-11 PROCEDURE — 20553 NJX 1/MLT TRIGGER POINTS 3/>: CPT | Performed by: INTERNAL MEDICINE

## 2018-09-11 PROCEDURE — 3017F COLORECTAL CA SCREEN DOC REV: CPT | Performed by: INTERNAL MEDICINE

## 2018-09-11 PROCEDURE — G8427 DOCREV CUR MEDS BY ELIG CLIN: HCPCS | Performed by: INTERNAL MEDICINE

## 2018-09-11 RX ORDER — TRIAMCINOLONE ACETONIDE 40 MG/ML
40 INJECTION, SUSPENSION INTRA-ARTICULAR; INTRAMUSCULAR ONCE
Status: COMPLETED | OUTPATIENT
Start: 2018-09-11 | End: 2018-09-11

## 2018-09-11 RX ORDER — OXYCODONE AND ACETAMINOPHEN 7.5; 325 MG/1; MG/1
1 TABLET ORAL EVERY 6 HOURS PRN
Qty: 120 TABLET | Refills: 0 | Status: SHIPPED | OUTPATIENT
Start: 2018-09-11 | End: 2018-10-09 | Stop reason: SDUPTHER

## 2018-09-11 RX ORDER — ZOLPIDEM TARTRATE 10 MG/1
TABLET ORAL
Qty: 30 TABLET | Refills: 0 | Status: SHIPPED | OUTPATIENT
Start: 2018-09-11 | End: 2018-10-09 | Stop reason: SDUPTHER

## 2018-09-11 RX ORDER — DICLOFENAC SODIUM 75 MG/1
TABLET, DELAYED RELEASE ORAL
Qty: 60 TABLET | Refills: 1 | Status: SHIPPED | OUTPATIENT
Start: 2018-09-11 | End: 2018-11-09 | Stop reason: SDUPTHER

## 2018-09-11 RX ORDER — GABAPENTIN 600 MG/1
TABLET ORAL
Qty: 120 TABLET | Refills: 1 | Status: SHIPPED | OUTPATIENT
Start: 2018-09-11 | End: 2018-11-09 | Stop reason: SDUPTHER

## 2018-09-11 RX ADMIN — TRIAMCINOLONE ACETONIDE 40 MG: 40 INJECTION, SUSPENSION INTRA-ARTICULAR; INTRAMUSCULAR at 10:41

## 2018-09-11 NOTE — PROGRESS NOTES
[employer]  1960  [id number]    HISTORY OF PRESENT ILLNESS:  Mr. Eamon Rose is a 63 y.o. male returns for a follow up visit for multiple medical problems. His current presenting problems are   1. Chronic pain syndrome    2. Postlaminectomy syndrome, lumbar region    3. Fibromyalgia    4. Primary insomnia    5. DDD (degenerative disc disease), lumbar    6. Lumbar radiculitis    7. Lumbar spondylosis    8. Failed neck syndrome    9. Elbow tendinitis    . As per information/history obtained from the PADT(patient assessment and documentation tool) - He complains of pain in the neck, upper back, mid back and lower back with radiation to the hands Left, buttocks and feet Left He rates the pain 5/10 and describes it as aching, throbbing, pins and needles. Pain is made worse by: nothing, movement, walking, standing, sitting, bending, lifting. Current treatment regimen has helped relieve about 50% of the pain. He denies side effects from the current pain regimen. Patient reports that since the last follow up visit the physical functioning is unchanged, family/social relationships are unchanged, mood is unchanged and sleep patterns are unchanged, and that the overall functioning is unchanged. Patient denies neurological bowel or bladder. Patient denies misusing/abusing his narcotic pain medications or using any illegal drugs. There are No indicators for possible drug abuse, addiction or diversion problems. Errol Simmons Upon obtaining the medical history from Mr. Eamon Rose regarding today's office visit for his presenting problems, patient states his back has been hurting a lot more recently along with his neck hurting also, he denies any A/I/T. Mr. Eamon Rose states he is still working full time. He says the pain goes into the left leg/foot, it is hard to do his ADL's. Patient states his sleep is fair. Has fairly normal sleep latency. Averages about 4-6 hours of sleep a night. Denies any signs of sleep apnea.  Feels somewhat which is part of the above treatment schedule. It has been helping with Mr. Mcmullen Sami chronic  medical problems which for this visit include:   Diagnoses of Chronic pain syndrome, Postlaminectomy syndrome, lumbar region, Fibromyalgia, Primary insomnia, DDD (degenerative disc disease), lumbar, Lumbar radiculitis, Lumbar spondylosis, Failed neck syndrome, and Elbow tendinitis were pertinent to this visit. Risks and benefits of the medications and other alternative treatments  including no treatment were discussed with the patient. The common side effects of these medications were also explained to the patient. Informed verbal consent was obtained. Goals of current treatment regimen include improvement in pain, restoration of functioning- with focus on improvement in physical performance, general activity, work or disability,emotional distress, health care utilization and  decreased medication consumption. Will continue to monitor progress towards achieving/maintaining therapeutic goals with special emphasis on  1. Improvement in perceived interfernce  of pain with ADL's. Ability to do home exercises independently. Ability to do household chores indoor and/or outdoor work and social and leisure activities. Improve psychosocial and physical functioning. - he is showing progression towards this treatment goal with the current regimen. 2. Ability to focus/concentrate at work and perform the duties required of him at work  Sit through a workday without lower extremity symptoms. Stand 30-60 minutes without lower extremity symptoms. Ability to lift up to 10-20 lbs. Ability to go up and down stairs. Sit 30-60 minutes  Without having to stand up frequently. - he is maintaining/progressing towards his work related goals with the current regimen.       He was advised against drinking alcohol with the narcotic pain medicines, advised against driving or handling machinery while adjusting the dose of medicines or if having

## 2018-10-09 ENCOUNTER — OFFICE VISIT (OUTPATIENT)
Dept: PAIN MANAGEMENT | Age: 58
End: 2018-10-09
Payer: COMMERCIAL

## 2018-10-09 VITALS
SYSTOLIC BLOOD PRESSURE: 138 MMHG | HEART RATE: 76 BPM | DIASTOLIC BLOOD PRESSURE: 89 MMHG | WEIGHT: 214 LBS | BODY MASS INDEX: 27.48 KG/M2

## 2018-10-09 DIAGNOSIS — M54.16 LUMBAR RADICULITIS: ICD-10-CM

## 2018-10-09 DIAGNOSIS — M51.36 DDD (DEGENERATIVE DISC DISEASE), LUMBAR: ICD-10-CM

## 2018-10-09 DIAGNOSIS — M96.1 FAILED NECK SYNDROME: ICD-10-CM

## 2018-10-09 DIAGNOSIS — M96.1 POSTLAMINECTOMY SYNDROME, LUMBAR REGION: ICD-10-CM

## 2018-10-09 DIAGNOSIS — M79.7 FIBROMYALGIA: ICD-10-CM

## 2018-10-09 DIAGNOSIS — G89.4 CHRONIC PAIN SYNDROME: ICD-10-CM

## 2018-10-09 DIAGNOSIS — M47.816 LUMBAR SPONDYLOSIS: ICD-10-CM

## 2018-10-09 PROCEDURE — 3017F COLORECTAL CA SCREEN DOC REV: CPT | Performed by: INTERNAL MEDICINE

## 2018-10-09 PROCEDURE — G8484 FLU IMMUNIZE NO ADMIN: HCPCS | Performed by: INTERNAL MEDICINE

## 2018-10-09 PROCEDURE — G8419 CALC BMI OUT NRM PARAM NOF/U: HCPCS | Performed by: INTERNAL MEDICINE

## 2018-10-09 PROCEDURE — 1036F TOBACCO NON-USER: CPT | Performed by: INTERNAL MEDICINE

## 2018-10-09 PROCEDURE — G8427 DOCREV CUR MEDS BY ELIG CLIN: HCPCS | Performed by: INTERNAL MEDICINE

## 2018-10-09 PROCEDURE — 99213 OFFICE O/P EST LOW 20 MIN: CPT | Performed by: INTERNAL MEDICINE

## 2018-10-09 RX ORDER — ZOLPIDEM TARTRATE 10 MG/1
TABLET ORAL
Qty: 30 TABLET | Refills: 0 | Status: SHIPPED | OUTPATIENT
Start: 2018-10-09 | End: 2018-11-09 | Stop reason: SDUPTHER

## 2018-10-09 RX ORDER — OXYCODONE AND ACETAMINOPHEN 7.5; 325 MG/1; MG/1
1 TABLET ORAL EVERY 6 HOURS PRN
Qty: 120 TABLET | Refills: 0 | Status: SHIPPED | OUTPATIENT
Start: 2018-10-09 | End: 2018-11-09 | Stop reason: SDUPTHER

## 2018-10-09 NOTE — PROGRESS NOTES
having issues with pilonidal cyst.    ALLERGIES: Patients list of allergies were reviewed     MEDICATIONS: Mr. Humera Abraham list of medications were reviewed. His current medications are   Outpatient Medications Prior to Visit   Medication Sig Dispense Refill    diclofenac (VOLTAREN) 75 MG EC tablet TAKE ONE TABLET BY MOUTH TWICE A DAY 60 tablet 1    gabapentin (NEURONTIN) 600 MG tablet Take one tablet QID. 120 tablet 1    ALPRAZolam (XANAX) 0.5 MG tablet Take 0.5 mg by mouth nightly as needed.  oxyCODONE-acetaminophen (PERCOCET) 7.5-325 MG per tablet Take 1 tablet by mouth every 6 hours as needed for Pain (max 4 per day) for up to 28 days. . 120 tablet 0    zolpidem (AMBIEN) 10 MG tablet TAKE ONE TABLET BY MOUTH ONCE NIGHTLY AS NEEDED FOR SLEEP. 30 tablet 0     No facility-administered medications prior to visit. SOCIAL/FAMILY/PAST MEDICAL HISTORY: Mr. Jalyn Hughes, family and past medical history was reviewed. REVIEW OF SYSTEMS:    Respiratory: Negative for apnea, chest tightness and shortness of breath or change in baseline breathing. Gastrointestinal: Negative for nausea, vomiting, abdominal pain, diarrhea, constipation, blood in stool and abdominal distention. PHYSICAL EXAM:   Nursing note and vitals reviewed. /89   Pulse 76   Wt 214 lb (97.1 kg)   BMI 27.48 kg/m²   Constitutional: He appears well-developed and well-nourished. No acute distress. Skin: Skin is warm and dry, good turgor. No rash noted. He is not diaphoretic. Cardiovascular: Normal rate, regular rhythm, normal heart sounds, and does not have murmur. Pulmonary/Chest: Effort normal. No respiratory distress. He does not have wheezes in the lung fields. He has no rales. Neurological/Psychiatric:He is alert and oriented to person, place, and time. Coordination is  normal. His mood isAppropriate and affect is Neutral/Euthymic(normal) . IMPRESSION:   1. Chronic pain syndrome    2.  Postlaminectomy

## 2018-11-09 ENCOUNTER — OFFICE VISIT (OUTPATIENT)
Dept: PAIN MANAGEMENT | Age: 58
End: 2018-11-09
Payer: COMMERCIAL

## 2018-11-09 VITALS
HEART RATE: 81 BPM | DIASTOLIC BLOOD PRESSURE: 72 MMHG | WEIGHT: 214 LBS | SYSTOLIC BLOOD PRESSURE: 137 MMHG | BODY MASS INDEX: 27.48 KG/M2

## 2018-11-09 DIAGNOSIS — M54.16 LUMBAR RADICULITIS: ICD-10-CM

## 2018-11-09 DIAGNOSIS — M96.1 FAILED NECK SYNDROME: ICD-10-CM

## 2018-11-09 DIAGNOSIS — G89.4 CHRONIC PAIN SYNDROME: ICD-10-CM

## 2018-11-09 DIAGNOSIS — M51.36 DDD (DEGENERATIVE DISC DISEASE), LUMBAR: ICD-10-CM

## 2018-11-09 DIAGNOSIS — M79.7 FIBROMYALGIA: ICD-10-CM

## 2018-11-09 DIAGNOSIS — M96.1 POSTLAMINECTOMY SYNDROME, LUMBAR REGION: ICD-10-CM

## 2018-11-09 DIAGNOSIS — M47.816 LUMBAR SPONDYLOSIS: ICD-10-CM

## 2018-11-09 PROCEDURE — 99213 OFFICE O/P EST LOW 20 MIN: CPT | Performed by: INTERNAL MEDICINE

## 2018-11-09 PROCEDURE — G8484 FLU IMMUNIZE NO ADMIN: HCPCS | Performed by: INTERNAL MEDICINE

## 2018-11-09 PROCEDURE — 1036F TOBACCO NON-USER: CPT | Performed by: INTERNAL MEDICINE

## 2018-11-09 PROCEDURE — G8427 DOCREV CUR MEDS BY ELIG CLIN: HCPCS | Performed by: INTERNAL MEDICINE

## 2018-11-09 PROCEDURE — 3017F COLORECTAL CA SCREEN DOC REV: CPT | Performed by: INTERNAL MEDICINE

## 2018-11-09 PROCEDURE — G8419 CALC BMI OUT NRM PARAM NOF/U: HCPCS | Performed by: INTERNAL MEDICINE

## 2018-11-09 RX ORDER — DICLOFENAC SODIUM 75 MG/1
TABLET, DELAYED RELEASE ORAL
Qty: 60 TABLET | Refills: 1 | Status: SHIPPED | OUTPATIENT
Start: 2018-11-09 | End: 2019-01-04 | Stop reason: SDUPTHER

## 2018-11-09 RX ORDER — OXYCODONE AND ACETAMINOPHEN 7.5; 325 MG/1; MG/1
1 TABLET ORAL EVERY 6 HOURS PRN
Qty: 120 TABLET | Refills: 0 | Status: SHIPPED | OUTPATIENT
Start: 2018-11-09 | End: 2018-12-07 | Stop reason: SDUPTHER

## 2018-11-09 RX ORDER — GABAPENTIN 600 MG/1
TABLET ORAL
Qty: 120 TABLET | Refills: 1 | Status: SHIPPED | OUTPATIENT
Start: 2018-11-09 | End: 2019-01-31

## 2018-11-09 RX ORDER — ZOLPIDEM TARTRATE 10 MG/1
TABLET ORAL
Qty: 30 TABLET | Refills: 0 | Status: SHIPPED | OUTPATIENT
Start: 2018-11-09 | End: 2018-12-07 | Stop reason: SDUPTHER

## 2018-12-07 ENCOUNTER — OFFICE VISIT (OUTPATIENT)
Dept: PAIN MANAGEMENT | Age: 58
End: 2018-12-07
Payer: COMMERCIAL

## 2018-12-07 VITALS
SYSTOLIC BLOOD PRESSURE: 142 MMHG | BODY MASS INDEX: 27.48 KG/M2 | HEART RATE: 70 BPM | WEIGHT: 214 LBS | DIASTOLIC BLOOD PRESSURE: 93 MMHG

## 2018-12-07 DIAGNOSIS — M79.7 FIBROMYALGIA: ICD-10-CM

## 2018-12-07 DIAGNOSIS — M51.36 DDD (DEGENERATIVE DISC DISEASE), LUMBAR: ICD-10-CM

## 2018-12-07 DIAGNOSIS — M96.1 POSTLAMINECTOMY SYNDROME, LUMBAR REGION: ICD-10-CM

## 2018-12-07 DIAGNOSIS — M96.1 FAILED NECK SYNDROME: ICD-10-CM

## 2018-12-07 DIAGNOSIS — G89.4 CHRONIC PAIN SYNDROME: ICD-10-CM

## 2018-12-07 DIAGNOSIS — M47.816 LUMBAR SPONDYLOSIS: ICD-10-CM

## 2018-12-07 DIAGNOSIS — M54.16 LUMBAR RADICULITIS: ICD-10-CM

## 2018-12-07 PROCEDURE — 1036F TOBACCO NON-USER: CPT | Performed by: INTERNAL MEDICINE

## 2018-12-07 PROCEDURE — G8419 CALC BMI OUT NRM PARAM NOF/U: HCPCS | Performed by: INTERNAL MEDICINE

## 2018-12-07 PROCEDURE — G8427 DOCREV CUR MEDS BY ELIG CLIN: HCPCS | Performed by: INTERNAL MEDICINE

## 2018-12-07 PROCEDURE — 3017F COLORECTAL CA SCREEN DOC REV: CPT | Performed by: INTERNAL MEDICINE

## 2018-12-07 PROCEDURE — 99213 OFFICE O/P EST LOW 20 MIN: CPT | Performed by: INTERNAL MEDICINE

## 2018-12-07 PROCEDURE — G8484 FLU IMMUNIZE NO ADMIN: HCPCS | Performed by: INTERNAL MEDICINE

## 2018-12-07 RX ORDER — ZOLPIDEM TARTRATE 10 MG/1
TABLET ORAL
Qty: 30 TABLET | Refills: 0 | Status: SHIPPED | OUTPATIENT
Start: 2018-12-07 | End: 2019-01-04 | Stop reason: SDUPTHER

## 2018-12-07 RX ORDER — OXYCODONE AND ACETAMINOPHEN 7.5; 325 MG/1; MG/1
1 TABLET ORAL EVERY 6 HOURS PRN
Qty: 120 TABLET | Refills: 0 | Status: SHIPPED | OUTPATIENT
Start: 2018-12-07 | End: 2019-01-04 | Stop reason: SDUPTHER

## 2018-12-07 NOTE — PROGRESS NOTES
Chronic pain syndrome, Postlaminectomy syndrome, lumbar region, Fibromyalgia, DDD (degenerative disc disease), lumbar, Lumbar spondylosis, Lumbar radiculitis, and Failed neck syndrome were pertinent to this visit. Risks and benefits of the medications and other alternative treatments  including no treatment were discussed with the patient. The common side effects of these medications were also explained to the patient. Informed verbal consent was obtained. Goals of current treatment regimen include improvement in pain, restoration of functioning- with focus on improvement in physical performance, general activity, work or disability,emotional distress, health care utilization and  decreased medication consumption. Will continue to monitor progress towards achieving/maintaining therapeutic goals with special emphasis on  1. Improvement in perceived interfernce  of pain with ADL's. Ability to do home exercises independently. Ability to do household chores indoor and/or outdoor work and social and leisure activities. Improve psychosocial and physical functioning. - he is showing progression towards this treatment goal with the current regimen. He was advised against drinking alcohol with the narcotic pain medicines, advised against driving or handling machinery while adjusting the dose of medicines or if having cognitive  issues related to the current medications. Risk of overdose and death, if medicines not taken as prescribed, were also discussed. If the patient develops new symptoms or if the symptoms worsen, the patient should call the office. While transcribing every attempt was made to maintain the accuracy of the note in terms of it's contents,there may have been some errors made inadvertently. Thank you for allowing me to participate in the care of this patient.     Madyson Martines MD.    Cc: Wayne Reilly MD

## 2019-01-04 ENCOUNTER — OFFICE VISIT (OUTPATIENT)
Dept: PAIN MANAGEMENT | Age: 59
End: 2019-01-04
Payer: COMMERCIAL

## 2019-01-04 VITALS
SYSTOLIC BLOOD PRESSURE: 150 MMHG | HEART RATE: 72 BPM | BODY MASS INDEX: 26.83 KG/M2 | WEIGHT: 209 LBS | DIASTOLIC BLOOD PRESSURE: 97 MMHG

## 2019-01-04 DIAGNOSIS — M51.36 DDD (DEGENERATIVE DISC DISEASE), LUMBAR: ICD-10-CM

## 2019-01-04 DIAGNOSIS — M96.1 FAILED NECK SYNDROME: ICD-10-CM

## 2019-01-04 DIAGNOSIS — M47.816 LUMBAR SPONDYLOSIS: ICD-10-CM

## 2019-01-04 DIAGNOSIS — G89.4 CHRONIC PAIN SYNDROME: ICD-10-CM

## 2019-01-04 DIAGNOSIS — M96.1 POSTLAMINECTOMY SYNDROME, LUMBAR REGION: ICD-10-CM

## 2019-01-04 DIAGNOSIS — Z91.89 AT RISK FOR RESPIRATORY DEPRESSION DUE TO OPIOID: ICD-10-CM

## 2019-01-04 DIAGNOSIS — M54.16 LUMBAR RADICULITIS: ICD-10-CM

## 2019-01-04 DIAGNOSIS — F51.01 PRIMARY INSOMNIA: ICD-10-CM

## 2019-01-04 DIAGNOSIS — M79.7 FIBROMYALGIA: ICD-10-CM

## 2019-01-04 PROCEDURE — G8419 CALC BMI OUT NRM PARAM NOF/U: HCPCS | Performed by: INTERNAL MEDICINE

## 2019-01-04 PROCEDURE — G8484 FLU IMMUNIZE NO ADMIN: HCPCS | Performed by: INTERNAL MEDICINE

## 2019-01-04 PROCEDURE — 3017F COLORECTAL CA SCREEN DOC REV: CPT | Performed by: INTERNAL MEDICINE

## 2019-01-04 PROCEDURE — G8427 DOCREV CUR MEDS BY ELIG CLIN: HCPCS | Performed by: INTERNAL MEDICINE

## 2019-01-04 PROCEDURE — 1036F TOBACCO NON-USER: CPT | Performed by: INTERNAL MEDICINE

## 2019-01-04 PROCEDURE — 99214 OFFICE O/P EST MOD 30 MIN: CPT | Performed by: INTERNAL MEDICINE

## 2019-01-04 RX ORDER — NALOXONE HYDROCHLORIDE 2 MG/.4ML
INJECTION, SOLUTION INTRAMUSCULAR; SUBCUTANEOUS
Qty: 1 PACKAGE | Refills: 0 | Status: SHIPPED | OUTPATIENT
Start: 2019-01-04

## 2019-01-04 RX ORDER — DICLOFENAC SODIUM 75 MG/1
TABLET, DELAYED RELEASE ORAL
Qty: 60 TABLET | Refills: 1 | Status: SHIPPED | OUTPATIENT
Start: 2019-01-04 | End: 2019-03-01 | Stop reason: SDUPTHER

## 2019-01-04 RX ORDER — OXYCODONE AND ACETAMINOPHEN 7.5; 325 MG/1; MG/1
1 TABLET ORAL EVERY 6 HOURS PRN
Qty: 120 TABLET | Refills: 0 | Status: SHIPPED | OUTPATIENT
Start: 2019-01-04 | End: 2019-01-31 | Stop reason: SDUPTHER

## 2019-01-04 RX ORDER — ZOLPIDEM TARTRATE 10 MG/1
TABLET ORAL
Qty: 30 TABLET | Refills: 0 | Status: SHIPPED | OUTPATIENT
Start: 2019-01-04 | End: 2019-01-31 | Stop reason: SDUPTHER

## 2019-01-31 ENCOUNTER — OFFICE VISIT (OUTPATIENT)
Dept: PAIN MANAGEMENT | Age: 59
End: 2019-01-31
Payer: COMMERCIAL

## 2019-01-31 VITALS
HEART RATE: 97 BPM | SYSTOLIC BLOOD PRESSURE: 156 MMHG | WEIGHT: 208 LBS | DIASTOLIC BLOOD PRESSURE: 92 MMHG | BODY MASS INDEX: 26.71 KG/M2

## 2019-01-31 DIAGNOSIS — G89.4 CHRONIC PAIN SYNDROME: ICD-10-CM

## 2019-01-31 DIAGNOSIS — M47.816 LUMBAR SPONDYLOSIS: ICD-10-CM

## 2019-01-31 DIAGNOSIS — M79.7 FIBROMYALGIA: ICD-10-CM

## 2019-01-31 DIAGNOSIS — M54.16 LUMBAR RADICULITIS: ICD-10-CM

## 2019-01-31 DIAGNOSIS — M96.1 FAILED NECK SYNDROME: ICD-10-CM

## 2019-01-31 DIAGNOSIS — M96.1 POSTLAMINECTOMY SYNDROME, LUMBAR REGION: ICD-10-CM

## 2019-01-31 DIAGNOSIS — M51.36 DDD (DEGENERATIVE DISC DISEASE), LUMBAR: ICD-10-CM

## 2019-01-31 PROCEDURE — 99213 OFFICE O/P EST LOW 20 MIN: CPT | Performed by: INTERNAL MEDICINE

## 2019-01-31 RX ORDER — OXYCODONE AND ACETAMINOPHEN 7.5; 325 MG/1; MG/1
1 TABLET ORAL EVERY 6 HOURS PRN
Qty: 120 TABLET | Refills: 0 | Status: SHIPPED | OUTPATIENT
Start: 2019-01-31 | End: 2019-03-01 | Stop reason: SDUPTHER

## 2019-01-31 RX ORDER — ZOLPIDEM TARTRATE 10 MG/1
TABLET ORAL
Qty: 30 TABLET | Refills: 0 | Status: SHIPPED | OUTPATIENT
Start: 2019-01-31 | End: 2019-03-01 | Stop reason: SDUPTHER

## 2019-03-01 ENCOUNTER — OFFICE VISIT (OUTPATIENT)
Dept: PAIN MANAGEMENT | Age: 59
End: 2019-03-01
Payer: COMMERCIAL

## 2019-03-01 VITALS
WEIGHT: 208 LBS | HEART RATE: 80 BPM | DIASTOLIC BLOOD PRESSURE: 90 MMHG | SYSTOLIC BLOOD PRESSURE: 137 MMHG | BODY MASS INDEX: 24.07 KG/M2 | HEIGHT: 78 IN

## 2019-03-01 DIAGNOSIS — M51.36 DDD (DEGENERATIVE DISC DISEASE), LUMBAR: ICD-10-CM

## 2019-03-01 DIAGNOSIS — M54.16 LUMBAR RADICULITIS: ICD-10-CM

## 2019-03-01 DIAGNOSIS — M79.7 FIBROMYALGIA: ICD-10-CM

## 2019-03-01 DIAGNOSIS — G89.4 CHRONIC PAIN SYNDROME: ICD-10-CM

## 2019-03-01 DIAGNOSIS — M96.1 FAILED NECK SYNDROME: ICD-10-CM

## 2019-03-01 DIAGNOSIS — M96.1 POSTLAMINECTOMY SYNDROME, LUMBAR REGION: ICD-10-CM

## 2019-03-01 DIAGNOSIS — M47.816 LUMBAR SPONDYLOSIS: ICD-10-CM

## 2019-03-01 PROCEDURE — 99213 OFFICE O/P EST LOW 20 MIN: CPT | Performed by: INTERNAL MEDICINE

## 2019-03-01 RX ORDER — OXYCODONE AND ACETAMINOPHEN 7.5; 325 MG/1; MG/1
1 TABLET ORAL EVERY 6 HOURS PRN
Qty: 120 TABLET | Refills: 0 | Status: SHIPPED | OUTPATIENT
Start: 2019-03-01 | End: 2019-03-28 | Stop reason: SDUPTHER

## 2019-03-01 RX ORDER — ZOLPIDEM TARTRATE 10 MG/1
TABLET ORAL
Qty: 30 TABLET | Refills: 0 | Status: SHIPPED | OUTPATIENT
Start: 2019-03-01 | End: 2019-03-28 | Stop reason: SDUPTHER

## 2019-03-01 RX ORDER — DICLOFENAC SODIUM 75 MG/1
TABLET, DELAYED RELEASE ORAL
Qty: 60 TABLET | Refills: 1 | Status: SHIPPED | OUTPATIENT
Start: 2019-03-01 | End: 2019-04-25 | Stop reason: SDUPTHER

## 2019-03-28 ENCOUNTER — OFFICE VISIT (OUTPATIENT)
Dept: PAIN MANAGEMENT | Age: 59
End: 2019-03-28
Payer: COMMERCIAL

## 2019-03-28 VITALS
DIASTOLIC BLOOD PRESSURE: 91 MMHG | WEIGHT: 208 LBS | HEART RATE: 87 BPM | BODY MASS INDEX: 22.85 KG/M2 | SYSTOLIC BLOOD PRESSURE: 145 MMHG

## 2019-03-28 DIAGNOSIS — G89.4 CHRONIC PAIN SYNDROME: ICD-10-CM

## 2019-03-28 DIAGNOSIS — M96.1 FAILED NECK SYNDROME: ICD-10-CM

## 2019-03-28 DIAGNOSIS — M47.816 LUMBAR SPONDYLOSIS: ICD-10-CM

## 2019-03-28 DIAGNOSIS — M96.1 POSTLAMINECTOMY SYNDROME, LUMBAR REGION: ICD-10-CM

## 2019-03-28 DIAGNOSIS — M51.36 DDD (DEGENERATIVE DISC DISEASE), LUMBAR: ICD-10-CM

## 2019-03-28 DIAGNOSIS — M54.16 LUMBAR RADICULITIS: ICD-10-CM

## 2019-03-28 DIAGNOSIS — M79.7 FIBROMYALGIA: ICD-10-CM

## 2019-03-28 PROCEDURE — 99213 OFFICE O/P EST LOW 20 MIN: CPT | Performed by: INTERNAL MEDICINE

## 2019-03-28 RX ORDER — OXYCODONE AND ACETAMINOPHEN 7.5; 325 MG/1; MG/1
1 TABLET ORAL EVERY 6 HOURS PRN
Qty: 120 TABLET | Refills: 0 | Status: SHIPPED | OUTPATIENT
Start: 2019-03-28 | End: 2019-04-25 | Stop reason: SDUPTHER

## 2019-03-28 RX ORDER — ZOLPIDEM TARTRATE 10 MG/1
TABLET ORAL
Qty: 30 TABLET | Refills: 0 | Status: SHIPPED | OUTPATIENT
Start: 2019-03-28 | End: 2019-04-25 | Stop reason: SDUPTHER

## 2019-04-25 ENCOUNTER — OFFICE VISIT (OUTPATIENT)
Dept: PAIN MANAGEMENT | Age: 59
End: 2019-04-25
Payer: COMMERCIAL

## 2019-04-25 VITALS
HEART RATE: 83 BPM | SYSTOLIC BLOOD PRESSURE: 137 MMHG | WEIGHT: 204 LBS | BODY MASS INDEX: 22.41 KG/M2 | DIASTOLIC BLOOD PRESSURE: 89 MMHG

## 2019-04-25 DIAGNOSIS — M96.1 POSTLAMINECTOMY SYNDROME, LUMBAR REGION: ICD-10-CM

## 2019-04-25 DIAGNOSIS — M96.1 FAILED NECK SYNDROME: ICD-10-CM

## 2019-04-25 DIAGNOSIS — M47.816 LUMBAR SPONDYLOSIS: ICD-10-CM

## 2019-04-25 DIAGNOSIS — M51.36 DDD (DEGENERATIVE DISC DISEASE), LUMBAR: ICD-10-CM

## 2019-04-25 DIAGNOSIS — M79.7 FIBROMYALGIA: ICD-10-CM

## 2019-04-25 DIAGNOSIS — M54.16 LUMBAR RADICULITIS: ICD-10-CM

## 2019-04-25 DIAGNOSIS — G89.4 CHRONIC PAIN SYNDROME: ICD-10-CM

## 2019-04-25 PROCEDURE — 99213 OFFICE O/P EST LOW 20 MIN: CPT | Performed by: INTERNAL MEDICINE

## 2019-04-25 PROCEDURE — 20553 NJX 1/MLT TRIGGER POINTS 3/>: CPT | Performed by: INTERNAL MEDICINE

## 2019-04-25 RX ORDER — OXYCODONE AND ACETAMINOPHEN 7.5; 325 MG/1; MG/1
1 TABLET ORAL EVERY 6 HOURS PRN
Qty: 120 TABLET | Refills: 0 | Status: SHIPPED | OUTPATIENT
Start: 2019-04-25 | End: 2019-05-24 | Stop reason: SDUPTHER

## 2019-04-25 RX ORDER — TRIAMCINOLONE ACETONIDE 40 MG/ML
40 INJECTION, SUSPENSION INTRA-ARTICULAR; INTRAMUSCULAR ONCE
Status: DISCONTINUED | OUTPATIENT
Start: 2019-04-25 | End: 2020-05-28

## 2019-04-25 RX ORDER — ZOLPIDEM TARTRATE 10 MG/1
TABLET ORAL
Qty: 30 TABLET | Refills: 0 | Status: SHIPPED | OUTPATIENT
Start: 2019-04-25 | End: 2019-05-24 | Stop reason: SDUPTHER

## 2019-04-25 RX ORDER — DICLOFENAC SODIUM 75 MG/1
TABLET, DELAYED RELEASE ORAL
Qty: 60 TABLET | Refills: 1 | Status: SHIPPED | OUTPATIENT
Start: 2019-04-25 | End: 2019-05-24 | Stop reason: SDUPTHER

## 2019-04-25 NOTE — PROGRESS NOTES
Medications Prior to Visit   Medication Sig Dispense Refill    Naloxone HCl (EVZIO) 2 MG/0.4ML SOAJ Use as directed 1 Package 0    ALPRAZolam (XANAX) 0.5 MG tablet Take 0.5 mg by mouth nightly as needed.  oxyCODONE-acetaminophen (PERCOCET) 7.5-325 MG per tablet Take 1 tablet by mouth every 6 hours as needed for Pain (max 4 per day) for up to 30 days. 120 tablet 0    zolpidem (AMBIEN) 10 MG tablet TAKE ONE TABLET BY MOUTH ONCE NIGHTLY AS NEEDED FOR SLEEP 30 tablet 0    diclofenac (VOLTAREN) 75 MG EC tablet TAKE ONE TABLET BY MOUTH TWICE A DAY 60 tablet 1     No facility-administered medications prior to visit. SOCIAL/FAMILY/PAST MEDICAL HISTORY: Mr. Subramanian Room, family and past medical history was reviewed. REVIEW OF SYSTEMS:    Respiratory: Negative for apnea, chest tightness and shortness of breath or change in baseline breathing. Gastrointestinal: Negative for nausea, vomiting, abdominal pain, diarrhea, constipation, blood in stool and abdominal distention. PHYSICAL EXAM:   Nursing note and vitals reviewed. /89   Pulse 83   Wt 204 lb (92.5 kg)   BMI 22.41 kg/m²   Constitutional: He appears well-developed and well-nourished. No acute distress. Skin: Skin is warm and dry, good turgor. No rash noted. He is not diaphoretic. Cardiovascular: Normal rate, regular rhythm, normal heart sounds, and does not have murmur. Pulmonary/Chest: Effort normal. No respiratory distress. He does not have wheezes in the lung fields. He has no rales. Neurological/Psychiatric:He is alert and oriented to person, place, and time. Coordination is  normal.  His mood isAppropriate and affect is Flat/blunted and Anxious . Other: Back + taut bands with decrease ROM     IMPRESSION:   1. Chronic pain syndrome    2. Fibromyalgia    3. DDD (degenerative disc disease), lumbar    4. Lumbar radiculitis    5. Failed neck syndrome    6. Lumbar spondylosis    7.  Postlaminectomy syndrome, lumbar region        PLAN:  Informed verbal consent was obtained  -Informed verbal consent was obtained from the patient. Risks and benefits of the procedure were explained. Complications of the procedure and side effects of kenalog/ lidocaine were discussed with patient. Using 0.25% marcaine and 1cc of kenalog, the the tender trigger point areas in the  bilateral paraspinal lumbar muscles -erector spinae and longgissmus muscles were injected under aseptic condition. Mobilization attempted by stretching. Patient tolerated procedure well. Adv to apply ice today.   -Continue with Voltaren 75 mg BID 2 weeks   -Continue with Percocet 4 per day   -he was advised proper sleep hygiene-told to avoid:use of caffeine or other stimulants after noon, alcohol use near bedtime, long or frequent naps during the day, erratic sleep schedule, heavy meals near bedtime, vigorous exercise near bedtime and use of electronic devices near bedtime    Current Outpatient Medications   Medication Sig Dispense Refill    oxyCODONE-acetaminophen (PERCOCET) 7.5-325 MG per tablet Take 1 tablet by mouth every 6 hours as needed for Pain (max 4 per day) for up to 30 days. 120 tablet 0    zolpidem (AMBIEN) 10 MG tablet TAKE ONE TABLET BY MOUTH ONCE NIGHTLY AS NEEDED FOR SLEEP 30 tablet 0    diclofenac (VOLTAREN) 75 MG EC tablet TAKE ONE TABLET BY MOUTH TWICE A DAY 60 tablet 1    Naloxone HCl (EVZIO) 2 MG/0.4ML SOAJ Use as directed 1 Package 0    ALPRAZolam (XANAX) 0.5 MG tablet Take 0.5 mg by mouth nightly as needed. Current Facility-Administered Medications   Medication Dose Route Frequency Provider Last Rate Last Dose    triamcinolone acetonide (KENALOG-40) injection 40 mg  40 mg Intramuscular Once Bert Lei MD         I will continue his current medication regimen  which is part of the above treatment schedule.  It has been helping with Mr. Selinda Apgar chronic  medical problems which for this visit include:   Diagnoses of Chronic pain symptoms worsen, the patient should call the office. While transcribing every attempt was made to maintain the accuracy of the note in terms of it's contents,there may have been some errors made inadvertently. Thank you for allowing me to participate in the care of this patient.     Shakeel Sharp MD.    Cc: Moreno López MD

## 2019-05-24 ENCOUNTER — OFFICE VISIT (OUTPATIENT)
Dept: PAIN MANAGEMENT | Age: 59
End: 2019-05-24
Payer: COMMERCIAL

## 2019-05-24 VITALS
DIASTOLIC BLOOD PRESSURE: 94 MMHG | BODY MASS INDEX: 21.97 KG/M2 | SYSTOLIC BLOOD PRESSURE: 137 MMHG | HEART RATE: 92 BPM | WEIGHT: 200 LBS

## 2019-05-24 DIAGNOSIS — M54.16 LUMBAR RADICULITIS: ICD-10-CM

## 2019-05-24 DIAGNOSIS — M79.7 FIBROMYALGIA: ICD-10-CM

## 2019-05-24 DIAGNOSIS — G89.4 CHRONIC PAIN SYNDROME: ICD-10-CM

## 2019-05-24 DIAGNOSIS — M96.1 FAILED NECK SYNDROME: ICD-10-CM

## 2019-05-24 DIAGNOSIS — M51.36 DDD (DEGENERATIVE DISC DISEASE), LUMBAR: ICD-10-CM

## 2019-05-24 DIAGNOSIS — M47.816 LUMBAR SPONDYLOSIS: ICD-10-CM

## 2019-05-24 DIAGNOSIS — M96.1 POSTLAMINECTOMY SYNDROME, LUMBAR REGION: ICD-10-CM

## 2019-05-24 PROCEDURE — 99213 OFFICE O/P EST LOW 20 MIN: CPT | Performed by: INTERNAL MEDICINE

## 2019-05-24 RX ORDER — OXYCODONE AND ACETAMINOPHEN 7.5; 325 MG/1; MG/1
1 TABLET ORAL EVERY 6 HOURS PRN
Qty: 120 TABLET | Refills: 0 | Status: SHIPPED | OUTPATIENT
Start: 2019-05-24 | End: 2019-06-24 | Stop reason: SDUPTHER

## 2019-05-24 RX ORDER — ZOLPIDEM TARTRATE 10 MG/1
TABLET ORAL
Qty: 30 TABLET | Refills: 0 | Status: SHIPPED | OUTPATIENT
Start: 2019-05-24 | End: 2019-06-24 | Stop reason: SDUPTHER

## 2019-05-24 RX ORDER — DICLOFENAC SODIUM 75 MG/1
TABLET, DELAYED RELEASE ORAL
Qty: 60 TABLET | Refills: 1 | Status: SHIPPED | OUTPATIENT
Start: 2019-05-24 | End: 2019-06-24 | Stop reason: SDUPTHER

## 2019-05-24 NOTE — PROGRESS NOTES
Luz Maria Ledezma  1960  [id number]      HISTORY OF PRESENT ILLNESS:  Mr. Ulisses Miller is a 63 y.o. male returns for a follow up visit for pain management  He has a diagnosis of   1. Chronic pain syndrome    2. Fibromyalgia    3. DDD (degenerative disc disease), lumbar    4. Lumbar radiculitis    5. Failed neck syndrome    6. Lumbar spondylosis    7. Postlaminectomy syndrome, lumbar region    8. Primary insomnia    . He complains of pain in the neck, upper/lower back, left hand, left foot/ankle He rates the pain 5/10 and describes it as aching, throbbing, pins and needles. Current treatment regimen has helped relieve about 50% of the pain. He denies any side effects from the current pain regimen. Patient reports that since the last follow up visit the physical functioning is unchanged, family/social relationships are unchanged, mood is unchanged sleep patterns are unchanged, and that the overall functioning is unchanged. Patient denies misusing/abusing his narcotic pain medications or using any illegal drugs. There are No indicators for possible drug abuse, addiction or diversion problems. Mr. Ulisses Miller states he has been doing fair, managing ok with the medications. He reports he is working full time still. He mentions he has been doing some exercises for the neck/back. Patient denies any constipation symptoms. ALLERGIES: Patients list of allergies were reviewed     MEDICATIONS: Mr. Ulisses Miller list of medications were reviewed. His current medications are   Outpatient Medications Prior to Visit   Medication Sig Dispense Refill    oxyCODONE-acetaminophen (PERCOCET) 7.5-325 MG per tablet Take 1 tablet by mouth every 6 hours as needed for Pain (max 4 per day) for up to 30 days.  120 tablet 0    zolpidem (AMBIEN) 10 MG tablet TAKE ONE TABLET BY MOUTH ONCE NIGHTLY AS NEEDED FOR SLEEP 30 tablet 0    diclofenac (VOLTAREN) 75 MG EC tablet TAKE ONE TABLET BY MOUTH TWICE A DAY 60 tablet 1    Naloxone HCl (EVZIO) 2 MG/0.4ML SOAJ Use as directed 1 Package 0    ALPRAZolam (XANAX) 0.5 MG tablet Take 0.5 mg by mouth nightly as needed. Facility-Administered Medications Prior to Visit   Medication Dose Route Frequency Provider Last Rate Last Dose    triamcinolone acetonide (KENALOG-40) injection 40 mg  40 mg Intramuscular Once Renzo rBoussard MD           SOCIAL/FAMILY/PAST MEDICAL HISTORY: Mr. Yannick Poole, family and past medical history was reviewed. REVIEW OF SYSTEMS:    Respiratory: Negative for apnea, chest tightness and shortness of breath or change in baseline breathing. Gastrointestinal: Negative for nausea, vomiting, abdominal pain, diarrhea, constipation, blood in stool and abdominal distention. PHYSICAL EXAM:   Nursing note and vitals reviewed. BP (!) 137/94   Pulse 92   Wt 200 lb (90.7 kg)   BMI 21.97 kg/m²   Constitutional: He appears well-developed and well-nourished. No acute distress. Skin: Skin is warm and dry, good turgor. No rash noted. He is not diaphoretic. Cardiovascular: Normal rate, regular rhythm, normal heart sounds, and does not have murmur. Pulmonary/Chest: Effort normal. No respiratory distress. He does not have wheezes in the lung fields. He has no rales. Neurological/Psychiatric:He is alert and oriented to person, place, and time. Coordination is  normal.  His mood isAppropriate and affect is Neutral/Euthymic(normal) . IMPRESSION:   1. Chronic pain syndrome    2. Fibromyalgia    3. DDD (degenerative disc disease), lumbar    4. Lumbar radiculitis    5. Failed neck syndrome    6. Lumbar spondylosis    7. Postlaminectomy syndrome, lumbar region        PLAN:  Informed verbal consent was obtained  -continue with current opioid regimen, Percocet 4 per day  -Adv Biofeedback, relaxation and meditation techniques.  Referral to psychologist for CBT was also discussed with patient   -he was advised proper sleep hygiene-told to avoid:use of caffeine or other stimulants after noon, alcohol use near bedtime, long or frequent naps during the day, erratic sleep schedule, heavy meals near bedtime, vigorous exercise near bedtime and use of electronic devices near bedtime   -He was advised to increase fluids ( 5-7  glasses of fluid daily), limit caffeine, avoid cheese products, increase dietary fiber, increase activity and exercise as tolerated and relax regularly and enjoy meals   -walking/stretching exercises as advised       Current Outpatient Medications   Medication Sig Dispense Refill    oxyCODONE-acetaminophen (PERCOCET) 7.5-325 MG per tablet Take 1 tablet by mouth every 6 hours as needed for Pain (max 4 per day) for up to 30 days. 120 tablet 0    zolpidem (AMBIEN) 10 MG tablet TAKE ONE TABLET BY MOUTH ONCE NIGHTLY AS NEEDED FOR SLEEP 30 tablet 0    diclofenac (VOLTAREN) 75 MG EC tablet TAKE ONE TABLET BY MOUTH TWICE A DAY 60 tablet 1    Naloxone HCl (EVZIO) 2 MG/0.4ML SOAJ Use as directed 1 Package 0    ALPRAZolam (XANAX) 0.5 MG tablet Take 0.5 mg by mouth nightly as needed. Current Facility-Administered Medications   Medication Dose Route Frequency Provider Last Rate Last Dose    triamcinolone acetonide (KENALOG-40) injection 40 mg  40 mg Intramuscular Once Shona Claude, MD         I will continue his current medication regimen  which is part of the above treatment schedule. It has been helping with Mr. Fede Camacho chronic  medical problems which for this visit include:   Diagnoses of Chronic pain syndrome, Fibromyalgia, DDD (degenerative disc disease), lumbar, Lumbar radiculitis, Failed neck syndrome, Lumbar spondylosis, Postlaminectomy syndrome, lumbar region, and Primary insomnia were pertinent to this visit. Risks and benefits of the medications and other alternative treatments  including no treatment were discussed with the patient. The common side effects of these medications were also explained to the patient. Informed verbal consent was obtained.    Goals of current treatment regimen include improvement in pain, restoration of functioning- with focus on improvement in physical performance, general activity, work or disability,emotional distress, health care utilization and  decreased medication consumption. Will continue to monitor progress towards achieving/maintaining therapeutic goals with special emphasis on  1. Improvement in perceived interfernce  of pain with ADL's. Ability to do home exercises independently. Ability to do household chores indoor and/or outdoor work and social and leisure activities. Improve psychosocial and physical functioning. - he is showing progression towards this treatment goal with the current regimen. He was advised against drinking alcohol with the narcotic pain medicines, advised against driving or handling machinery while adjusting the dose of medicines or if having cognitive  issues related to the current medications. Risk of overdose and death, if medicines not taken as prescribed, were also discussed. If the patient develops new symptoms or if the symptoms worsen, the patient should call the office. While transcribing every attempt was made to maintain the accuracy of the note in terms of it's contents,there may have been some errors made inadvertently. Thank you for allowing me to participate in the care of this patient.     Jeri Gomez MD.    Cc: Magdiel Weber MD

## 2019-06-24 ENCOUNTER — OFFICE VISIT (OUTPATIENT)
Dept: PAIN MANAGEMENT | Age: 59
End: 2019-06-24
Payer: COMMERCIAL

## 2019-06-24 VITALS
SYSTOLIC BLOOD PRESSURE: 135 MMHG | WEIGHT: 202 LBS | BODY MASS INDEX: 22.19 KG/M2 | DIASTOLIC BLOOD PRESSURE: 82 MMHG | HEART RATE: 89 BPM

## 2019-06-24 DIAGNOSIS — M47.816 LUMBAR SPONDYLOSIS: ICD-10-CM

## 2019-06-24 DIAGNOSIS — G89.4 CHRONIC PAIN SYNDROME: ICD-10-CM

## 2019-06-24 DIAGNOSIS — M96.1 POSTLAMINECTOMY SYNDROME, LUMBAR REGION: ICD-10-CM

## 2019-06-24 DIAGNOSIS — M54.16 LUMBAR RADICULITIS: ICD-10-CM

## 2019-06-24 DIAGNOSIS — M51.36 DDD (DEGENERATIVE DISC DISEASE), LUMBAR: ICD-10-CM

## 2019-06-24 DIAGNOSIS — M96.1 FAILED NECK SYNDROME: ICD-10-CM

## 2019-06-24 DIAGNOSIS — M79.7 FIBROMYALGIA: ICD-10-CM

## 2019-06-24 PROCEDURE — 99213 OFFICE O/P EST LOW 20 MIN: CPT | Performed by: INTERNAL MEDICINE

## 2019-06-24 RX ORDER — OXYCODONE AND ACETAMINOPHEN 7.5; 325 MG/1; MG/1
1 TABLET ORAL EVERY 6 HOURS PRN
Qty: 140 TABLET | Refills: 0 | Status: SHIPPED | OUTPATIENT
Start: 2019-06-24 | End: 2019-07-29 | Stop reason: SDUPTHER

## 2019-06-24 RX ORDER — ZOLPIDEM TARTRATE 10 MG/1
TABLET ORAL
Qty: 30 TABLET | Refills: 0 | Status: SHIPPED | OUTPATIENT
Start: 2019-06-24 | End: 2019-07-29 | Stop reason: SDUPTHER

## 2019-06-24 RX ORDER — DICLOFENAC SODIUM 75 MG/1
TABLET, DELAYED RELEASE ORAL
Qty: 60 TABLET | Refills: 1 | Status: SHIPPED | OUTPATIENT
Start: 2019-06-24 | End: 2019-07-29 | Stop reason: SDUPTHER

## 2019-06-24 NOTE — PROGRESS NOTES
Ken Belinda  1960  [id number]    HISTORY OF PRESENT ILLNESS:  Mr. Katharine Vidales is a 63 y.o. male returns for a follow up visit for multiple medical problems. His current presenting problems are   1. Chronic pain syndrome    2. Fibromyalgia    3. DDD (degenerative disc disease), lumbar    4. Lumbar radiculitis    5. Failed neck syndrome    6. Lumbar spondylosis    7. Primary insomnia    8. Postlaminectomy syndrome, lumbar region    . As per information/history obtained from the PADT(patient assessment and documentation tool) - He complains of pain in the neck, shoulders Bilateral, upper back, mid back and lower back with radiation to the hands Left, buttocks, hips Bilateral, ankles Left and feet Left He rates the pain 5/10 and describes it as aching, throbbing, pins and needles. Pain is made worse by: nothing, walking, standing, sitting, bending, lifting. Current treatment regimen has helped relieve about 50% of the pain. He denies side effects from the current pain regimen. Patient reports that since the last follow up visit the physical functioning is unchanged, family/social relationships are unchanged, mood is unchanged and sleep patterns are unchanged, and that the overall functioning is unchanged. Patient denies neurological bowel or bladder. Patient denies misusing/abusing his narcotic pain medications or using any illegal drugs. There are No indicators for possible drug abuse, addiction or diversion problems. Upon obtaining the medical history from Mr. Katharine Vidales regarding today's office visit for his presenting problems, Mr. Katharine Vidales states he has been doing fair, pain has been worse in the back lately. Patient denies any side effects with the medications. He mentions he is using Ambien still which is helping with sleep, denies any side effects with it. Patient denies any constipation symptoms.        ALLERGIES: Patients list of allergies were reviewed     MEDICATIONS: Mr. Katharine Vidales list of medications were reviewed. His current medications are   Outpatient Medications Prior to Visit   Medication Sig Dispense Refill    diclofenac (VOLTAREN) 75 MG EC tablet TAKE ONE TABLET BY MOUTH TWICE A DAY 60 tablet 1    Naloxone HCl (EVZIO) 2 MG/0.4ML SOAJ Use as directed 1 Package 0    ALPRAZolam (XANAX) 0.5 MG tablet Take 0.5 mg by mouth nightly as needed. Facility-Administered Medications Prior to Visit   Medication Dose Route Frequency Provider Last Rate Last Dose    triamcinolone acetonide (KENALOG-40) injection 40 mg  40 mg Intramuscular Once Edilma Soriano MD           SOCIAL/FAMILY/PAST MEDICAL HISTORY: Mr. Lexa Hayes, family and past medical history was reviewed. REVIEW OF SYSTEMS:    Respiratory: Negative for apnea, chest tightness and shortness of breath or change in baseline breathing. Gastrointestinal: Negative for nausea, vomiting, abdominal pain, diarrhea, constipation, blood in stool and abdominal distention. PHYSICAL EXAM:   Nursing note and vitals reviewed. /82   Pulse 89   Wt 202 lb (91.6 kg)   BMI 22.19 kg/m²   Constitutional: He appears well-developed and well-nourished. No acute distress. Skin: Skin is warm and dry, good turgor. No rash noted. He is not diaphoretic. Cardiovascular: Normal rate, regular rhythm, normal heart sounds, and 2/6SM murmur. Pulmonary/Chest: Effort normal. No respiratory distress. He does not have wheezes in the lung fields. He has no rales. Neurological/Psychiatric:He is alert and oriented to person, place, and time. Coordination is  normal.  His mood isAppropriate and affect is Neutral/Euthymic(normal) . IMPRESSION:   1. Chronic pain syndrome    2. Fibromyalgia    3. DDD (degenerative disc disease), lumbar    4. Lumbar radiculitis    5. Failed neck syndrome    6. Lumbar spondylosis    7.  Postlaminectomy syndrome, lumbar region        PLAN:  Informed verbal consent was obtained  -[place] was obtained and reviewed  for the last one year and no indicators of drug misuse  were found. Any other controlled substance prescriptions  seen on the record have been accounted for, I am aware of the patient receiving these medications. Cheryl Cheney [place] record will be rechecked as part of office protocol.   -He was advised to increase fluids ( 5-7  glasses of fluid daily), limit caffeine, avoid cheese products, increase dietary fiber, increase activity and exercise as tolerated and relax regularly and enjoy meals   -he was advised proper sleep hygiene-told to avoid:use of caffeine or other stimulants after noon, alcohol use near bedtime, long or frequent naps during the day, erratic sleep schedule, heavy meals near bedtime, vigorous exercise near bedtime and use of electronic devices near bedtime, continue with Ambien  -walking/stretching exercises as advised       Current Outpatient Medications   Medication Sig Dispense Refill    diclofenac (VOLTAREN) 75 MG EC tablet TAKE ONE TABLET BY MOUTH TWICE A DAY 60 tablet 1    Naloxone HCl (EVZIO) 2 MG/0.4ML SOAJ Use as directed 1 Package 0    ALPRAZolam (XANAX) 0.5 MG tablet Take 0.5 mg by mouth nightly as needed. Current Facility-Administered Medications   Medication Dose Route Frequency Provider Last Rate Last Dose    triamcinolone acetonide (KENALOG-40) injection 40 mg  40 mg Intramuscular Once Miguelangel Jeffries MD         I will continue his current medication regimen  which is part of the above treatment schedule. It has been helping with Mr. Nancy Zee chronic  medical problems which for this visit include:   Diagnoses of Chronic pain syndrome, Fibromyalgia, DDD (degenerative disc disease), lumbar, Lumbar radiculitis, Failed neck syndrome, Lumbar spondylosis, Primary insomnia, and Postlaminectomy syndrome, lumbar region were pertinent to this visit. Risks and benefits of the medications and other alternative treatments  including no treatment were discussed with the patient. The

## 2019-07-29 ENCOUNTER — OFFICE VISIT (OUTPATIENT)
Dept: PAIN MANAGEMENT | Age: 59
End: 2019-07-29
Payer: COMMERCIAL

## 2019-07-29 VITALS
HEART RATE: 85 BPM | SYSTOLIC BLOOD PRESSURE: 147 MMHG | BODY MASS INDEX: 22.52 KG/M2 | WEIGHT: 205 LBS | DIASTOLIC BLOOD PRESSURE: 90 MMHG

## 2019-07-29 DIAGNOSIS — M96.1 FAILED NECK SYNDROME: ICD-10-CM

## 2019-07-29 DIAGNOSIS — M54.16 LUMBAR RADICULITIS: ICD-10-CM

## 2019-07-29 DIAGNOSIS — M79.7 FIBROMYALGIA: ICD-10-CM

## 2019-07-29 DIAGNOSIS — G89.4 CHRONIC PAIN SYNDROME: ICD-10-CM

## 2019-07-29 DIAGNOSIS — M51.36 DDD (DEGENERATIVE DISC DISEASE), LUMBAR: ICD-10-CM

## 2019-07-29 DIAGNOSIS — M96.1 POSTLAMINECTOMY SYNDROME, LUMBAR REGION: ICD-10-CM

## 2019-07-29 DIAGNOSIS — M47.816 LUMBAR SPONDYLOSIS: ICD-10-CM

## 2019-07-29 PROCEDURE — 99213 OFFICE O/P EST LOW 20 MIN: CPT | Performed by: INTERNAL MEDICINE

## 2019-07-29 RX ORDER — ZOLPIDEM TARTRATE 10 MG/1
TABLET ORAL
Qty: 30 TABLET | Refills: 0 | Status: SHIPPED | OUTPATIENT
Start: 2019-07-29 | End: 2019-08-26 | Stop reason: SDUPTHER

## 2019-07-29 RX ORDER — OXYCODONE AND ACETAMINOPHEN 7.5; 325 MG/1; MG/1
1 TABLET ORAL EVERY 6 HOURS PRN
Qty: 120 TABLET | Refills: 0 | Status: SHIPPED | OUTPATIENT
Start: 2019-07-29 | End: 2019-08-26 | Stop reason: SDUPTHER

## 2019-07-29 RX ORDER — DICLOFENAC SODIUM 75 MG/1
TABLET, DELAYED RELEASE ORAL
Qty: 60 TABLET | Refills: 1 | Status: SHIPPED | OUTPATIENT
Start: 2019-07-29 | End: 2019-08-26 | Stop reason: SDUPTHER

## 2019-07-29 NOTE — PROGRESS NOTES
advised against drinking alcohol with the narcotic pain medicines, advised against driving or handling machinery while adjusting the dose of medicines or if having cognitive  issues related to the current medications. Risk of overdose and death, if medicines not taken as prescribed, were also discussed. If the patient develops new symptoms or if the symptoms worsen, the patient should call the office. While transcribing every attempt was made to maintain the accuracy of the note in terms of it's contents,there may have been some errors made inadvertently. Thank you for allowing me to participate in the care of this patient.     Jim Juan MD.    Cc: Dori Day MD

## 2019-08-26 ENCOUNTER — OFFICE VISIT (OUTPATIENT)
Dept: PAIN MANAGEMENT | Age: 59
End: 2019-08-26
Payer: COMMERCIAL

## 2019-08-26 VITALS
HEART RATE: 70 BPM | DIASTOLIC BLOOD PRESSURE: 88 MMHG | SYSTOLIC BLOOD PRESSURE: 144 MMHG | BODY MASS INDEX: 22.74 KG/M2 | WEIGHT: 207 LBS

## 2019-08-26 DIAGNOSIS — G89.4 CHRONIC PAIN SYNDROME: ICD-10-CM

## 2019-08-26 DIAGNOSIS — M96.1 FAILED NECK SYNDROME: ICD-10-CM

## 2019-08-26 DIAGNOSIS — M51.36 DDD (DEGENERATIVE DISC DISEASE), LUMBAR: ICD-10-CM

## 2019-08-26 DIAGNOSIS — M96.1 POSTLAMINECTOMY SYNDROME, LUMBAR REGION: ICD-10-CM

## 2019-08-26 DIAGNOSIS — M79.7 FIBROMYALGIA: ICD-10-CM

## 2019-08-26 DIAGNOSIS — M54.16 LUMBAR RADICULITIS: ICD-10-CM

## 2019-08-26 DIAGNOSIS — M47.816 LUMBAR SPONDYLOSIS: ICD-10-CM

## 2019-08-26 PROCEDURE — 99213 OFFICE O/P EST LOW 20 MIN: CPT | Performed by: INTERNAL MEDICINE

## 2019-08-26 RX ORDER — OXYCODONE AND ACETAMINOPHEN 7.5; 325 MG/1; MG/1
1 TABLET ORAL EVERY 6 HOURS PRN
Qty: 120 TABLET | Refills: 0 | Status: SHIPPED | OUTPATIENT
Start: 2019-08-26 | End: 2019-09-20 | Stop reason: SDUPTHER

## 2019-08-26 RX ORDER — DICLOFENAC SODIUM 75 MG/1
TABLET, DELAYED RELEASE ORAL
Qty: 60 TABLET | Refills: 1 | Status: SHIPPED | OUTPATIENT
Start: 2019-08-26 | End: 2019-10-21

## 2019-08-26 RX ORDER — ZOLPIDEM TARTRATE 10 MG/1
TABLET ORAL
Qty: 30 TABLET | Refills: 0 | Status: SHIPPED | OUTPATIENT
Start: 2019-08-26 | End: 2019-09-20 | Stop reason: SDUPTHER

## 2019-08-26 NOTE — PROGRESS NOTES
monitor progress towards achieving/maintaining therapeutic goals with special emphasis on  1. Improvement in perceived interfernce  of pain with ADL's. Ability to do home exercises independently. Ability to do household chores indoor and/or outdoor work and social and leisure activities. Improve psychosocial and physical functioning. - he is showing progression towards this treatment goal with the current regimen. He was advised against drinking alcohol with the narcotic pain medicines, advised against driving or handling machinery while adjusting the dose of medicines or if having cognitive  issues related to the current medications. Risk of overdose and death, if medicines not taken as prescribed, were also discussed. If the patient develops new symptoms or if the symptoms worsen, the patient should call the office. While transcribing every attempt was made to maintain the accuracy of the note in terms of it's contents,there may have been some errors made inadvertently. Thank you for allowing me to participate in the care of this patient.     Vilma Garcia MD.    Cc: Kalpesh Mane MD

## 2019-09-20 ENCOUNTER — OFFICE VISIT (OUTPATIENT)
Dept: PAIN MANAGEMENT | Age: 59
End: 2019-09-20
Payer: COMMERCIAL

## 2019-09-20 VITALS
DIASTOLIC BLOOD PRESSURE: 89 MMHG | SYSTOLIC BLOOD PRESSURE: 137 MMHG | BODY MASS INDEX: 22.52 KG/M2 | WEIGHT: 205 LBS | HEART RATE: 94 BPM

## 2019-09-20 DIAGNOSIS — M47.816 LUMBAR SPONDYLOSIS: ICD-10-CM

## 2019-09-20 DIAGNOSIS — M54.16 LUMBAR RADICULITIS: ICD-10-CM

## 2019-09-20 DIAGNOSIS — M51.36 DDD (DEGENERATIVE DISC DISEASE), LUMBAR: ICD-10-CM

## 2019-09-20 DIAGNOSIS — M96.1 POSTLAMINECTOMY SYNDROME, LUMBAR REGION: ICD-10-CM

## 2019-09-20 DIAGNOSIS — G89.4 CHRONIC PAIN SYNDROME: ICD-10-CM

## 2019-09-20 DIAGNOSIS — M54.12 BRACHIAL NEURITIS OR RADICULITIS: ICD-10-CM

## 2019-09-20 DIAGNOSIS — M79.7 FIBROMYALGIA: ICD-10-CM

## 2019-09-20 DIAGNOSIS — M96.1 FAILED NECK SYNDROME: ICD-10-CM

## 2019-09-20 PROCEDURE — 99213 OFFICE O/P EST LOW 20 MIN: CPT | Performed by: INTERNAL MEDICINE

## 2019-09-20 RX ORDER — ZOLPIDEM TARTRATE 10 MG/1
TABLET ORAL
Qty: 30 TABLET | Refills: 0 | Status: SHIPPED | OUTPATIENT
Start: 2019-09-20 | End: 2019-10-21 | Stop reason: SDUPTHER

## 2019-09-20 RX ORDER — OXYCODONE AND ACETAMINOPHEN 7.5; 325 MG/1; MG/1
1 TABLET ORAL EVERY 6 HOURS PRN
Qty: 120 TABLET | Refills: 0 | Status: SHIPPED | OUTPATIENT
Start: 2019-09-20 | End: 2019-10-21 | Stop reason: SDUPTHER

## 2019-09-30 ENCOUNTER — TELEPHONE (OUTPATIENT)
Dept: PAIN MANAGEMENT | Age: 59
End: 2019-09-30

## 2019-10-21 ENCOUNTER — OFFICE VISIT (OUTPATIENT)
Dept: PAIN MANAGEMENT | Age: 59
End: 2019-10-21
Payer: COMMERCIAL

## 2019-10-21 VITALS
DIASTOLIC BLOOD PRESSURE: 91 MMHG | SYSTOLIC BLOOD PRESSURE: 137 MMHG | BODY MASS INDEX: 22.52 KG/M2 | HEART RATE: 83 BPM | WEIGHT: 205 LBS

## 2019-10-21 DIAGNOSIS — M54.12 BRACHIAL NEURITIS OR RADICULITIS: ICD-10-CM

## 2019-10-21 DIAGNOSIS — M47.816 LUMBAR SPONDYLOSIS: ICD-10-CM

## 2019-10-21 DIAGNOSIS — M51.36 DDD (DEGENERATIVE DISC DISEASE), LUMBAR: ICD-10-CM

## 2019-10-21 DIAGNOSIS — M96.1 POSTLAMINECTOMY SYNDROME, LUMBAR REGION: ICD-10-CM

## 2019-10-21 DIAGNOSIS — G89.4 CHRONIC PAIN SYNDROME: ICD-10-CM

## 2019-10-21 DIAGNOSIS — M79.7 FIBROMYALGIA: ICD-10-CM

## 2019-10-21 DIAGNOSIS — M96.1 FAILED NECK SYNDROME: ICD-10-CM

## 2019-10-21 DIAGNOSIS — M54.16 LUMBAR RADICULITIS: ICD-10-CM

## 2019-10-21 PROCEDURE — 99213 OFFICE O/P EST LOW 20 MIN: CPT | Performed by: INTERNAL MEDICINE

## 2019-10-21 RX ORDER — ZOLPIDEM TARTRATE 10 MG/1
TABLET ORAL
Qty: 30 TABLET | Refills: 0 | Status: SHIPPED | OUTPATIENT
Start: 2019-10-21 | End: 2019-10-24 | Stop reason: SDUPTHER

## 2019-10-21 RX ORDER — OXYCODONE AND ACETAMINOPHEN 7.5; 325 MG/1; MG/1
1 TABLET ORAL EVERY 6 HOURS PRN
Qty: 120 TABLET | Refills: 0 | Status: SHIPPED | OUTPATIENT
Start: 2019-10-21 | End: 2019-10-24 | Stop reason: SDUPTHER

## 2019-10-23 ENCOUNTER — TELEPHONE (OUTPATIENT)
Dept: PAIN MANAGEMENT | Age: 59
End: 2019-10-23

## 2019-10-23 DIAGNOSIS — M96.1 FAILED NECK SYNDROME: ICD-10-CM

## 2019-10-23 DIAGNOSIS — G89.4 CHRONIC PAIN SYNDROME: ICD-10-CM

## 2019-10-23 DIAGNOSIS — M96.1 POSTLAMINECTOMY SYNDROME, LUMBAR REGION: ICD-10-CM

## 2019-10-23 DIAGNOSIS — M54.16 LUMBAR RADICULITIS: ICD-10-CM

## 2019-10-23 DIAGNOSIS — M47.816 LUMBAR SPONDYLOSIS: ICD-10-CM

## 2019-10-23 DIAGNOSIS — M79.7 FIBROMYALGIA: ICD-10-CM

## 2019-10-23 DIAGNOSIS — M51.36 DDD (DEGENERATIVE DISC DISEASE), LUMBAR: ICD-10-CM

## 2019-10-24 RX ORDER — ZOLPIDEM TARTRATE 10 MG/1
TABLET ORAL
Qty: 30 TABLET | Refills: 0 | Status: SHIPPED | OUTPATIENT
Start: 2019-10-24 | End: 2019-11-21 | Stop reason: SDUPTHER

## 2019-10-24 RX ORDER — OXYCODONE AND ACETAMINOPHEN 7.5; 325 MG/1; MG/1
1 TABLET ORAL EVERY 6 HOURS PRN
Qty: 120 TABLET | Refills: 0 | Status: SHIPPED | OUTPATIENT
Start: 2019-10-24 | End: 2019-11-21 | Stop reason: SDUPTHER

## 2019-11-21 ENCOUNTER — OFFICE VISIT (OUTPATIENT)
Dept: PAIN MANAGEMENT | Age: 59
End: 2019-11-21
Payer: COMMERCIAL

## 2019-11-21 VITALS
BODY MASS INDEX: 22.52 KG/M2 | WEIGHT: 205 LBS | DIASTOLIC BLOOD PRESSURE: 89 MMHG | HEART RATE: 79 BPM | SYSTOLIC BLOOD PRESSURE: 139 MMHG

## 2019-11-21 DIAGNOSIS — M96.1 POSTLAMINECTOMY SYNDROME, LUMBAR REGION: ICD-10-CM

## 2019-11-21 DIAGNOSIS — M54.12 BRACHIAL NEURITIS OR RADICULITIS: ICD-10-CM

## 2019-11-21 DIAGNOSIS — M79.7 FIBROMYALGIA: ICD-10-CM

## 2019-11-21 DIAGNOSIS — M96.1 FAILED NECK SYNDROME: ICD-10-CM

## 2019-11-21 DIAGNOSIS — M54.16 LUMBAR RADICULITIS: ICD-10-CM

## 2019-11-21 DIAGNOSIS — G89.4 CHRONIC PAIN SYNDROME: ICD-10-CM

## 2019-11-21 DIAGNOSIS — M51.36 DDD (DEGENERATIVE DISC DISEASE), LUMBAR: ICD-10-CM

## 2019-11-21 DIAGNOSIS — M47.816 LUMBAR SPONDYLOSIS: ICD-10-CM

## 2019-11-21 PROCEDURE — 99213 OFFICE O/P EST LOW 20 MIN: CPT | Performed by: INTERNAL MEDICINE

## 2019-11-21 RX ORDER — ZOLPIDEM TARTRATE 10 MG/1
TABLET ORAL
Qty: 30 TABLET | Refills: 0 | Status: SHIPPED | OUTPATIENT
Start: 2019-11-21 | End: 2019-12-19 | Stop reason: SDUPTHER

## 2019-11-21 RX ORDER — OXYCODONE AND ACETAMINOPHEN 7.5; 325 MG/1; MG/1
1 TABLET ORAL EVERY 6 HOURS PRN
Qty: 120 TABLET | Refills: 0 | Status: SHIPPED | OUTPATIENT
Start: 2019-11-21 | End: 2019-12-19 | Stop reason: SDUPTHER

## 2019-12-19 ENCOUNTER — OFFICE VISIT (OUTPATIENT)
Dept: PAIN MANAGEMENT | Age: 59
End: 2019-12-19
Payer: COMMERCIAL

## 2019-12-19 VITALS
SYSTOLIC BLOOD PRESSURE: 132 MMHG | WEIGHT: 205 LBS | DIASTOLIC BLOOD PRESSURE: 88 MMHG | BODY MASS INDEX: 22.52 KG/M2 | HEART RATE: 74 BPM

## 2019-12-19 DIAGNOSIS — M54.16 LUMBAR RADICULITIS: ICD-10-CM

## 2019-12-19 DIAGNOSIS — M47.816 LUMBAR SPONDYLOSIS: ICD-10-CM

## 2019-12-19 DIAGNOSIS — M96.1 FAILED NECK SYNDROME: ICD-10-CM

## 2019-12-19 DIAGNOSIS — G89.4 CHRONIC PAIN SYNDROME: ICD-10-CM

## 2019-12-19 DIAGNOSIS — M79.7 FIBROMYALGIA: ICD-10-CM

## 2019-12-19 DIAGNOSIS — M51.36 DDD (DEGENERATIVE DISC DISEASE), LUMBAR: ICD-10-CM

## 2019-12-19 DIAGNOSIS — M96.1 POSTLAMINECTOMY SYNDROME, LUMBAR REGION: ICD-10-CM

## 2019-12-19 PROCEDURE — 99213 OFFICE O/P EST LOW 20 MIN: CPT | Performed by: INTERNAL MEDICINE

## 2019-12-19 RX ORDER — OXYCODONE AND ACETAMINOPHEN 7.5; 325 MG/1; MG/1
1 TABLET ORAL EVERY 6 HOURS PRN
Qty: 120 TABLET | Refills: 0 | Status: SHIPPED | OUTPATIENT
Start: 2019-12-19 | End: 2020-01-17 | Stop reason: SDUPTHER

## 2019-12-19 RX ORDER — ZOLPIDEM TARTRATE 10 MG/1
TABLET ORAL
Qty: 30 TABLET | Refills: 0 | Status: SHIPPED | OUTPATIENT
Start: 2019-12-19 | End: 2020-01-17 | Stop reason: SDUPTHER

## 2020-01-17 ENCOUNTER — OFFICE VISIT (OUTPATIENT)
Dept: PAIN MANAGEMENT | Age: 60
End: 2020-01-17
Payer: COMMERCIAL

## 2020-01-17 VITALS
HEART RATE: 76 BPM | BODY MASS INDEX: 22.52 KG/M2 | DIASTOLIC BLOOD PRESSURE: 87 MMHG | WEIGHT: 205 LBS | SYSTOLIC BLOOD PRESSURE: 142 MMHG

## 2020-01-17 PROCEDURE — 99213 OFFICE O/P EST LOW 20 MIN: CPT | Performed by: INTERNAL MEDICINE

## 2020-01-17 RX ORDER — DICLOFENAC SODIUM 75 MG/1
TABLET, DELAYED RELEASE ORAL
Qty: 60 TABLET | Refills: 0 | Status: SHIPPED | OUTPATIENT
Start: 2020-01-17 | End: 2020-03-03 | Stop reason: SDUPTHER

## 2020-01-17 RX ORDER — OXYCODONE AND ACETAMINOPHEN 7.5; 325 MG/1; MG/1
1 TABLET ORAL EVERY 6 HOURS PRN
Qty: 120 TABLET | Refills: 0 | Status: SHIPPED | OUTPATIENT
Start: 2020-01-17 | End: 2020-03-03 | Stop reason: SDUPTHER

## 2020-01-17 RX ORDER — ZOLPIDEM TARTRATE 10 MG/1
TABLET ORAL
Qty: 30 TABLET | Refills: 0 | Status: SHIPPED | OUTPATIENT
Start: 2020-01-17 | End: 2020-03-03 | Stop reason: SDUPTHER

## 2020-01-17 NOTE — PROGRESS NOTES
disability,emotional distress, health care utilization and  decreased medication consumption. Will continue to monitor progress towards achieving/maintaining therapeutic goals with special emphasis on  1. Improvement in perceived interfernce  of pain with ADL's. Ability to do home exercises independently. Ability to do household chores indoor and/or outdoor work and social and leisure activities. Improve psychosocial and physical functioning. - he is showing progression towards this treatment goal with the current regimen. He was advised against drinking alcohol with the narcotic pain medicines, advised against driving or handling machinery while adjusting the dose of medicines or if having cognitive  issues related to the current medications. Risk of overdose and death, if medicines not taken as prescribed, were also discussed. If the patient develops new symptoms or if the symptoms worsen, the patient should call the office. While transcribing every attempt was made to maintain the accuracy of the note in terms of it's contents,there may have been some errors made inadvertently. Thank you for allowing me to participate in the care of this patient.     Elli Betancourt MD.    Cc: Pebbles Coburn MD

## 2020-03-03 ENCOUNTER — OFFICE VISIT (OUTPATIENT)
Dept: PAIN MANAGEMENT | Age: 60
End: 2020-03-03
Payer: COMMERCIAL

## 2020-03-03 VITALS
BODY MASS INDEX: 22.52 KG/M2 | HEART RATE: 76 BPM | DIASTOLIC BLOOD PRESSURE: 89 MMHG | SYSTOLIC BLOOD PRESSURE: 138 MMHG | WEIGHT: 205 LBS

## 2020-03-03 PROCEDURE — 99213 OFFICE O/P EST LOW 20 MIN: CPT | Performed by: INTERNAL MEDICINE

## 2020-03-03 RX ORDER — ZOLPIDEM TARTRATE 10 MG/1
TABLET ORAL
Qty: 30 TABLET | Refills: 0 | Status: SHIPPED | OUTPATIENT
Start: 2020-03-03 | End: 2020-04-03 | Stop reason: SDUPTHER

## 2020-03-03 RX ORDER — DICLOFENAC SODIUM 75 MG/1
TABLET, DELAYED RELEASE ORAL
Qty: 60 TABLET | Refills: 0 | Status: SHIPPED | OUTPATIENT
Start: 2020-03-03 | End: 2020-04-03 | Stop reason: SDUPTHER

## 2020-03-03 RX ORDER — OXYCODONE AND ACETAMINOPHEN 7.5; 325 MG/1; MG/1
1 TABLET ORAL EVERY 6 HOURS PRN
Qty: 120 TABLET | Refills: 0 | Status: SHIPPED | OUTPATIENT
Start: 2020-03-03 | End: 2020-04-03 | Stop reason: SDUPTHER

## 2020-03-03 NOTE — PROGRESS NOTES
daily), limit caffeine, avoid cheese products, increase dietary fiber, increase activity and exercise as tolerated and relax regularly and enjoy meals   -Walking/Stretching exercises as advised   -Adv Biofeedback, relaxation and meditation techniques. Referral to psychologist for CBT was also discussed with patient    Current Outpatient Medications   Medication Sig Dispense Refill    diclofenac (VOLTAREN) 75 MG EC tablet TAKE ONE TABLET BY MOUTH TWICE A DAY 60 tablet 0    Naloxone HCl (EVZIO) 2 MG/0.4ML SOAJ Use as directed 1 Package 0    ALPRAZolam (XANAX) 0.5 MG tablet Take 0.5 mg by mouth nightly as needed. Current Facility-Administered Medications   Medication Dose Route Frequency Provider Last Rate Last Dose    triamcinolone acetonide (KENALOG-40) injection 40 mg  40 mg Intramuscular Once Severa Berry, MD         I will continue his current medication regimen  which is part of the above treatment schedule. It has been helping with Mr. Kenyetta Gee chronic  medical problems which for this visit include:   Diagnoses of Chronic pain syndrome, Fibromyalgia, Lumbar spondylosis, Lumbar radiculitis, DDD (degenerative disc disease), lumbar, Failed neck syndrome, Postlaminectomy syndrome, lumbar region, and Brachial neuritis or radiculitis were pertinent to this visit. Risks and benefits of the medications and other alternative treatments  including no treatment were discussed with the patient. The common side effects of these medications were also explained to the patient. Informed verbal consent was obtained. Goals of current treatment regimen include improvement in pain, restoration of functioning- with focus on improvement in physical performance, general activity, work or disability,emotional distress, health care utilization and  decreased medication consumption. Will continue to monitor progress towards achieving/maintaining therapeutic goals with special emphasis on  1.  Improvement in perceived interfernce  of pain with ADL's. Ability to do home exercises independently. Ability to do household chores indoor and/or outdoor work and social and leisure activities. Improve psychosocial and physical functioning. - he is showing progression towards this treatment goal with the current regimen. 2. Ability to focus/concentrate at work and perform the duties required of him at work  Sit through a workday without lower extremity symptoms. Stand 30-60 minutes without lower extremity symptoms. Ability to lift up to 10-20 lbs. Ability to go up and down stairs. Sit 30-60 minutes  Without having to stand up frequently. - he is maintaining/progressing towards his work related goals with the current regimen. He was advised against drinking alcohol with the narcotic pain medicines, advised against driving or handling machinery while adjusting the dose of medicines or if having cognitive  issues related to the current medications. Risk of overdose and death, if medicines not taken as prescribed, were also discussed. If the patient develops new symptoms or if the symptoms worsen, the patient should call the office. While transcribing every attempt was made to maintain the accuracy of the note in terms of it's contents,there may have been some errors made inadvertently. Thank you for allowing me to participate in the care of this patient.     Fouzia Owen MD.    Cc: Laurel Escobar MD    I, Conrado Alford, am scribing for and in the presence of Dr. Fouzia Owen.   03/03/20  4:09 PM  MALA Weaver, Dr. Fouzia Owen, personally performed the services described in this documentation as scribed by   Conrado Alford MA in my presence and it is both accurate and complete

## 2020-04-02 ENCOUNTER — VIRTUAL VISIT (OUTPATIENT)
Dept: PAIN MANAGEMENT | Age: 60
End: 2020-04-02
Payer: COMMERCIAL

## 2020-04-02 PROCEDURE — 99213 OFFICE O/P EST LOW 20 MIN: CPT | Performed by: INTERNAL MEDICINE

## 2020-04-02 NOTE — PROGRESS NOTES
TELE HEALTH VISIT (AUDIO-VISUAL)    Pursuant to the emergency declaration under the [facility], [facility] authority and the [employer] Act, this Virtual  Visit was conducted, with patient's consent, to reduce the patient's risk of exposure to COVID-19 and provide continuity of care for an established patient. Service is  provided through a video synchronous discussion virtually to substitute for in-person clinic visit. Maximiliano July 1960  <[id number]>    Mr. Ludin Michaud is being seen virtually for a follow up visit using [employer] me/[employer] Video visit. Informed verbal consent to the virtual visit was obtained from Mr. Ludin Michaud. Risks associated with HIPPA compliance with the virtual visit was explained to the patient. Mr. Ludin Michaud is at his residence and Dr. Sharath Rivera is in his office. HISTORY OF PRESENT ILLNESS:  Mr. Ludin Michaud is a 63 y.o. male  being assessed for a follow up visit for pain management for evaluation of ongoing care regarding his symptoms and monitoring of compliance with long term use high risk medications. He has a diagnosis of   1. Chronic pain syndrome    2. Fibromyalgia    3. Lumbar spondylosis    4. Lumbar radiculitis    5. DDD (degenerative disc disease), lumbar    6. Failed neck syndrome    7. Postlaminectomy syndrome, lumbar region    8. Brachial neuritis or radiculitis    . He complains of pain in the neck  with radiation to the lower back . He rates the pain 6/10 and describes it as aching, burning, throbbing. Current treatment regimen has helped relieve about 30% of the pain. He denies any side effects from the current pain regimen. Patient reports that since the last follow up visit the physical functioning is unchanged, family/social relationships are unchanged, mood is unchanged sleep patterns are unchanged, and that the overall functioning is unchanged.   Patient denies He appears well-developed and well-nourished. No acute distress. No respiratory distress. Skin: Skin appears to be warm and dry. No rashes or any other marks noted. He is not diaphoretic. Neurological/Psychiatric:He is alert and oriented to person, place, and time. Coordination is  normal.  His mood isAppropriate and affect is Neutral/Euthymic(normal). His behavior is normal.   thought content normal.   Musculoskeletal / Extremities: Range of motion is normal. Gait is normal, assistive devices use: none. IMPRESSION:   1. Chronic pain syndrome    2. Fibromyalgia    3. Lumbar spondylosis    4. Lumbar radiculitis    5. DDD (degenerative disc disease), lumbar    6. Failed neck syndrome    7. Postlaminectomy syndrome, lumbar region    8. Brachial neuritis or radiculitis        PLAN:  Informed verbal consent regarding treatment was obtained  -back stretching exercises as advised   -he was advised proper sleep hygiene-told to avoid:use of caffeine or other stimulants after noon, alcohol use near bedtime, long or frequent naps during the day, erratic sleep schedule, heavy meals near bedtime, vigorous exercise near bedtime and use of electronic devices near bedtime   -walking as tolerated   -He was advised to increase fluids ( 5-7  glasses of fluid daily), limit caffeine, avoid cheese products, increase dietary fiber, increase activity and exercise as tolerated and relax regularly and enjoy meals   -continue with Percocet 7.5 mg 4 per day     Current Outpatient Medications   Medication Sig Dispense Refill    diclofenac (VOLTAREN) 75 MG EC tablet TAKE ONE TABLET BY MOUTH TWICE A DAY 60 tablet 0    oxyCODONE-acetaminophen (PERCOCET) 7.5-325 MG per tablet Take 1 tablet by mouth every 6 hours as needed for Pain (max 4 per day) for up to 30 days. 120 tablet 0    Naloxone HCl (EVZIO) 2 MG/0.4ML SOAJ Use as directed 1 Package 0    ALPRAZolam (XANAX) 0.5 MG tablet Take 0.5 mg by mouth nightly as needed.          Current Facility-Administered Medications   Medication Dose Route Frequency Provider Last Rate Last Dose    triamcinolone acetonide (KENALOG-40) injection 40 mg  40 mg Intramuscular Once Sujatha Robertson MD         I will continue his current medication regimen  which is part of the above treatment schedule. It has been helping with Mr. Ulloa Cassette chronic  medical problems which for this visit include:   Diagnoses of Chronic pain syndrome, Fibromyalgia, Lumbar spondylosis, Lumbar radiculitis, DDD (degenerative disc disease), lumbar, Failed neck syndrome, Postlaminectomy syndrome, lumbar region, and Brachial neuritis or radiculitis were pertinent to this visit. Risks and benefits of the medications and other alternative treatments  including no treatment were discussed with the patient. The common side effects of these medications were also explained to the patient. Informed verbal consent was obtained. Goals of current treatment regimen include improvement in pain, restoration of functioning- with focus on improvement in physical performance, general activity, work or disability,emotional distress, health care utilization and  decreased medication consumption. Will continue to monitor progress towards achieving/maintaining therapeutic goals with special emphasis on  1. Improvement in perceived interfernce  of pain with ADL's. Ability to do home exercises independently. Ability to do household chores indoor and/or outdoor work and social and leisure activities. Improve psychosocial and physical functioning. - he is showing progression towards this treatment goal with the current regimen. He was advised against drinking alcohol with the narcotic pain medicines, advised against driving or handling machinery while adjusting the dose of medicines or if having cognitive  issues related to the current medications. Risk of overdose and death, if medicines not taken as prescribed, were also discussed.  If the patient develops

## 2020-04-03 RX ORDER — DICLOFENAC SODIUM 75 MG/1
TABLET, DELAYED RELEASE ORAL
Qty: 60 TABLET | Refills: 0 | Status: SHIPPED | OUTPATIENT
Start: 2020-04-03 | End: 2020-05-28 | Stop reason: SDUPTHER

## 2020-04-03 RX ORDER — OXYCODONE AND ACETAMINOPHEN 7.5; 325 MG/1; MG/1
1 TABLET ORAL EVERY 6 HOURS PRN
Qty: 120 TABLET | Refills: 0 | Status: SHIPPED | OUTPATIENT
Start: 2020-04-03 | End: 2020-04-30 | Stop reason: SDUPTHER

## 2020-04-03 RX ORDER — ZOLPIDEM TARTRATE 10 MG/1
TABLET ORAL
Qty: 30 TABLET | Refills: 0 | Status: SHIPPED | OUTPATIENT
Start: 2020-04-03 | End: 2020-04-30 | Stop reason: SDUPTHER

## 2020-04-30 ENCOUNTER — VIRTUAL VISIT (OUTPATIENT)
Dept: PAIN MANAGEMENT | Age: 60
End: 2020-04-30
Payer: COMMERCIAL

## 2020-04-30 PROCEDURE — 99213 OFFICE O/P EST LOW 20 MIN: CPT | Performed by: INTERNAL MEDICINE

## 2020-04-30 RX ORDER — OXYCODONE AND ACETAMINOPHEN 7.5; 325 MG/1; MG/1
1 TABLET ORAL EVERY 6 HOURS PRN
Qty: 120 TABLET | Refills: 0 | Status: SHIPPED | OUTPATIENT
Start: 2020-04-30 | End: 2020-05-28 | Stop reason: SDUPTHER

## 2020-04-30 RX ORDER — ZOLPIDEM TARTRATE 10 MG/1
TABLET ORAL
Qty: 30 TABLET | Refills: 0 | Status: SHIPPED | OUTPATIENT
Start: 2020-04-30 | End: 2020-05-28 | Stop reason: SDUPTHER

## 2020-04-30 NOTE — PROGRESS NOTES
TELE HEALTH VISIT (AUDIO-VISUAL)    Pursuant to the emergency declaration under the [facility], [facility] waiver authority and the [employer] Act, this Virtual  Visit was conducted, with patient's/legal guardian's consent, to reduce the patient's risk of exposure to COVID-19 and provide continuity of care for an established patient. Service is  provided through a video synchronous discussion virtually to substitute for in-person clinic visit. Due to this being a TeleHealth encounter (During CVJFQ-19 public health emergency), evaluation of the following organ systems was limited: Vitals/Constitutional/EENT/Resp/CV/GI//MS/Neuro/Skin/Afpk-Kmli-Vcf. Darshana Muta  1960  <[id number]>    Mr. Yvette Escobar is being seen virtually for a follow up visit using one of the following techniques-[employer] me/[employer] Video visit/[employer] or Face time. Informed verbal consent to the virtual visit was obtained from Mr. Yvette Escobar. Risks associated with HIPPA compliance with the virtual visit was explained to the patient. Mr. Yvette Escobar is at his residence and Dr. Sarahy Reyna is in his office. HISTORY OF PRESENT ILLNESS:  Mr. Yvette Escobar is a 63 y.o. male  being assessed for a follow up visit for pain management for evaluation of ongoing care regarding his symptoms and monitoring of compliance with long term use high risk medications. He has a diagnosis of   1. Chronic pain syndrome    2. Fibromyalgia    3. Lumbar spondylosis    4. Lumbar radiculitis    5. DDD (degenerative disc disease), lumbar    6. Failed neck syndrome    7. Postlaminectomy syndrome, lumbar region    8. Brachial neuritis or radiculitis    . He complains of pain in the upper, lower back  with radiation to the neck . He rates the pain 6/10 and describes it as burning, throbbing. Current treatment regimen has helped relieve about 30% of the pain.   He denies any side effects bedtime and use of electronic devices near bedtime. Continue with Ambien     Current Outpatient Medications   Medication Sig Dispense Refill    diclofenac (VOLTAREN) 75 MG EC tablet TAKE ONE TABLET BY MOUTH TWICE A DAY 60 tablet 0    oxyCODONE-acetaminophen (PERCOCET) 7.5-325 MG per tablet Take 1 tablet by mouth every 6 hours as needed for Pain (max 4 per day) for up to 30 days. 120 tablet 0    zolpidem (AMBIEN) 10 MG tablet TAKE ONE TABLET BY MOUTH ONCE NIGHTLY AS NEEDED FOR SLEEP 30 tablet 0    Naloxone HCl (EVZIO) 2 MG/0.4ML SOAJ Use as directed 1 Package 0    ALPRAZolam (XANAX) 0.5 MG tablet Take 0.5 mg by mouth nightly as needed. Current Facility-Administered Medications   Medication Dose Route Frequency Provider Last Rate Last Dose    triamcinolone acetonide (KENALOG-40) injection 40 mg  40 mg Intramuscular Once Sujatha Robertson MD         I will continue his current medication regimen  which is part of the above treatment schedule. It has been helping with Mr. Ulloa Cassette chronic  medical problems which for this visit include:   Diagnoses of Chronic pain syndrome, Fibromyalgia, Lumbar spondylosis, Lumbar radiculitis, DDD (degenerative disc disease), lumbar, Failed neck syndrome, Postlaminectomy syndrome, lumbar region, and Brachial neuritis or radiculitis were pertinent to this visit. Risks and benefits of the medications and other alternative treatments  including no treatment were discussed with the patient. The common side effects of these medications were also explained to the patient. Informed verbal consent was obtained. Goals of current treatment regimen include improvement in pain, restoration of functioning- with focus on improvement in physical performance, general activity, work or disability,emotional distress, health care utilization and  decreased medication consumption. Will continue to monitor progress towards achieving/maintaining therapeutic goals with special emphasis on  1. Improvement in perceived interfernce  of pain with ADL's. Ability to do home exercises independently. Ability to do household chores indoor and/or outdoor work and social and leisure activities. Improve psychosocial and physical functioning. - he is showing progression towards this treatment goal with the current regimen. He was advised against drinking alcohol with the narcotic pain medicines, advised against driving or handling machinery while adjusting the dose of medicines or if having cognitive  issues related to the current medications. Risk of overdose and death, if medicines not taken as prescribed, were also discussed. If the patient develops new symptoms or if the symptoms worsen, the patient should call the office. While transcribing every attempt was made to maintain the accuracy of the note in terms of it's contents,there may have been some errors made inadvertently. Thank you for allowing me to participate in the care of this patient.     Dominic Cruz MD.    Cc: Saba Gale MD

## 2020-05-28 ENCOUNTER — VIRTUAL VISIT (OUTPATIENT)
Dept: PAIN MANAGEMENT | Age: 60
End: 2020-05-28
Payer: COMMERCIAL

## 2020-05-28 PROCEDURE — 99213 OFFICE O/P EST LOW 20 MIN: CPT | Performed by: INTERNAL MEDICINE

## 2020-05-28 RX ORDER — ZOLPIDEM TARTRATE 10 MG/1
TABLET ORAL
Qty: 30 TABLET | Refills: 0 | Status: SHIPPED | OUTPATIENT
Start: 2020-05-28 | End: 2020-06-22 | Stop reason: SDUPTHER

## 2020-05-28 RX ORDER — DICLOFENAC SODIUM 75 MG/1
TABLET, DELAYED RELEASE ORAL
Qty: 60 TABLET | Refills: 0 | Status: SHIPPED | OUTPATIENT
Start: 2020-05-28 | End: 2020-06-22 | Stop reason: SDUPTHER

## 2020-05-28 RX ORDER — OXYCODONE AND ACETAMINOPHEN 7.5; 325 MG/1; MG/1
1 TABLET ORAL EVERY 6 HOURS PRN
Qty: 120 TABLET | Refills: 0 | Status: SHIPPED | OUTPATIENT
Start: 2020-05-28 | End: 2020-06-22 | Stop reason: SDUPTHER

## 2020-05-28 NOTE — PROGRESS NOTES
Palmira's chronic  medical problems which for this visit include:   Diagnoses of Chronic pain syndrome, Postlaminectomy syndrome, lumbar region, Fibromyalgia, DDD (degenerative disc disease), lumbar, Lumbar radiculitis, Lumbar spondylosis, Failed neck syndrome, and Brachial neuritis or radiculitis were pertinent to this visit. Risks and benefits of the medications and other alternative treatments  including no treatment were discussed with the patient. The common side effects of these medications were also explained to the patient. Informed verbal consent was obtained. Goals of current treatment regimen include improvement in pain, restoration of functioning- with focus on improvement in physical performance, general activity, work or disability,emotional distress, health care utilization and  decreased medication consumption. Will continue to monitor progress towards achieving/maintaining therapeutic goals with special emphasis on  1. Improvement in perceived interfernce  of pain with ADL's. Ability to do home exercises independently. Ability to do household chores indoor and/or outdoor work and social and leisure activities. Improve psychosocial and physical functioning. - he is showing progression towards this treatment goal with the current regimen. He was advised against drinking alcohol with the narcotic pain medicines, advised against driving or handling machinery while adjusting the dose of medicines or if having cognitive  issues related to the current medications. Risk of overdose and death, if medicines not taken as prescribed, were also discussed. If the patient develops new symptoms or if the symptoms worsen, the patient should call the office. While transcribing every attempt was made to maintain the accuracy of the note in terms of it's contents,there may have been some errors made inadvertently. Thank you for allowing me to participate in the care of this patient.     Brianda Zarate, MD.    Cc: Carin Fletcher MD

## 2020-06-22 ENCOUNTER — VIRTUAL VISIT (OUTPATIENT)
Dept: PAIN MANAGEMENT | Age: 60
End: 2020-06-22
Payer: COMMERCIAL

## 2020-06-22 PROCEDURE — 99213 OFFICE O/P EST LOW 20 MIN: CPT | Performed by: INTERNAL MEDICINE

## 2020-06-22 RX ORDER — OXYCODONE AND ACETAMINOPHEN 7.5; 325 MG/1; MG/1
1 TABLET ORAL EVERY 6 HOURS PRN
Qty: 120 TABLET | Refills: 0 | Status: SHIPPED | OUTPATIENT
Start: 2020-06-22 | End: 2020-07-20 | Stop reason: SDUPTHER

## 2020-06-22 RX ORDER — DICLOFENAC SODIUM 75 MG/1
TABLET, DELAYED RELEASE ORAL
Qty: 60 TABLET | Refills: 0 | Status: SHIPPED | OUTPATIENT
Start: 2020-06-22 | End: 2020-07-20 | Stop reason: SDUPTHER

## 2020-06-22 RX ORDER — ZOLPIDEM TARTRATE 10 MG/1
TABLET ORAL
Qty: 30 TABLET | Refills: 0 | Status: SHIPPED | OUTPATIENT
Start: 2020-06-22 | End: 2020-07-20 | Stop reason: SDUPTHER

## 2020-06-22 NOTE — PROGRESS NOTES
TELE HEALTH VISIT (AUDIO-VISUAL)    Pursuant to the emergency declaration under the [facility], [facility] waiver authority and the [employer] Act, this Virtual  Visit was conducted, with patient's/legal guardian's consent, to reduce the patient's risk of exposure to COVID-19 and provide continuity of care for an established patient. Service is  provided through a video synchronous discussion virtually to substitute for in-person clinic visit. Due to this being a TeleHealth encounter (During PNQFS-02 public health emergency), evaluation of the following organ systems was limited: Vitals/Constitutional/EENT/Resp/CV/GI//MS/Neuro/Skin/Xyoz-Kizz-Icq. Odette Munoz  1960  <[id number]>    Mr. German Bermudez is being seen virtually for a follow up visit using one of the following techniques  Face time   Informed verbal consent to the virtual visit was obtained from Mr. German Bermudez. Risks associated with HIPPA compliance with the virtual visit was explained to the patient. Mr. German Bermudez is at his residence and Dr. Porfirio Tim is in his office. HISTORY OF PRESENT ILLNESS:  Mr. German Bermudez is a 63 y.o. male  being assessed for a follow up visit for pain management for evaluation of ongoing care regarding his symptoms and monitoring of compliance with long term use high risk medications. He has a diagnosis of   1. Chronic pain syndrome    2. Postlaminectomy syndrome, lumbar region    3. Fibromyalgia    4. DDD (degenerative disc disease), lumbar    5. Lumbar radiculitis    6. Lumbar spondylosis    7. Failed neck syndrome    8. Brachial neuritis or radiculitis    . He complains of pain in the Neck and low back   with radiation to the shoulders Bilateral and hands Left . He rates the pain 7/10 and describes it as sharp, aching. Current treatment regimen has helped relieve about 30% of the pain.   He denies any side effects from the current visit. Constitutional: He appears well-developed and well-nourished. No acute distress. No respiratory distress. Skin: Skin appears to be warm and dry. No rashes or any other marks noted. He is not diaphoretic. Respiratory/Pulmonary: NO conversational dyspnea, no accessory muscle use, no coughing during exam. He does not appear to be in labored breathing. Neurological/Psychiatric:He is alert and oriented to person, place, and time. Coordination is  normal.  His mood isAppropriate and affect is Neutral/Euthymic(normal). Musculoskeletal / Extremities: Range of motion is normal. Gait is normal, assistive devices use: none. IMPRESSION:   1. Chronic pain syndrome    2. Postlaminectomy syndrome, lumbar region    3. Fibromyalgia    4. DDD (degenerative disc disease), lumbar    5. Lumbar radiculitis    6. Lumbar spondylosis    7. Failed neck syndrome    8. Brachial neuritis or radiculitis        PLAN:  Informed verbal consent regarding treatment was obtained  -Continue with current opioid regimen Percocet 4 per day   -Adv Biofeedback, relaxation and meditation techniques. Referral to psychologist for CBT was also discussed with patient   -He was advised to increase fluids ( 5-7  glasses of fluid daily), limit caffeine, avoid cheese products, increase dietary fiber, increase activity and exercise as tolerated and relax regularly and enjoy meals   -Walking/Stretching exercises as advised   Current Outpatient Medications   Medication Sig Dispense Refill    oxyCODONE-acetaminophen (PERCOCET) 7.5-325 MG per tablet Take 1 tablet by mouth every 6 hours as needed for Pain (max 4 per day) for up to 30 days.  120 tablet 0    zolpidem (AMBIEN) 10 MG tablet TAKE ONE TABLET BY MOUTH ONCE NIGHTLY AS NEEDED FOR SLEEP 30 tablet 0    diclofenac (VOLTAREN) 75 MG EC tablet TAKE ONE TABLET BY MOUTH TWICE A DAY 60 tablet 0    Naloxone HCl (EVZIO) 2 MG/0.4ML SOAJ Use as directed 1 Package 0    ALPRAZolam (XANAX) 0.5 MG tablet Take against drinking alcohol with the narcotic pain medicines, advised against driving or handling machinery while adjusting the dose of medicines or if having cognitive  issues related to the current medications. Risk of overdose and death, if medicines not taken as prescribed, were also discussed. If the patient develops new symptoms or if the symptoms worsen, the patient should call the office. While transcribing every attempt was made to maintain the accuracy of the note in terms of it's contents,there may have been some errors made inadvertently. Thank you for allowing me to participate in the care of this patient.     Melba Loza MD.    Cc: Sarah Nunes MD

## 2020-07-20 ENCOUNTER — VIRTUAL VISIT (OUTPATIENT)
Dept: PAIN MANAGEMENT | Age: 60
End: 2020-07-20
Payer: COMMERCIAL

## 2020-07-20 PROCEDURE — 99213 OFFICE O/P EST LOW 20 MIN: CPT | Performed by: INTERNAL MEDICINE

## 2020-07-20 RX ORDER — OXYCODONE AND ACETAMINOPHEN 7.5; 325 MG/1; MG/1
1 TABLET ORAL EVERY 6 HOURS PRN
Qty: 120 TABLET | Refills: 0 | Status: SHIPPED | OUTPATIENT
Start: 2020-07-20 | End: 2020-08-17 | Stop reason: SDUPTHER

## 2020-07-20 RX ORDER — ZOLPIDEM TARTRATE 10 MG/1
TABLET ORAL
Qty: 30 TABLET | Refills: 0 | Status: SHIPPED | OUTPATIENT
Start: 2020-07-20 | End: 2020-08-17 | Stop reason: SDUPTHER

## 2020-07-20 RX ORDER — DICLOFENAC SODIUM 75 MG/1
TABLET, DELAYED RELEASE ORAL
Qty: 60 TABLET | Refills: 0 | Status: SHIPPED | OUTPATIENT
Start: 2020-07-20 | End: 2020-08-17 | Stop reason: SDUPTHER

## 2020-07-20 NOTE — PROGRESS NOTES
vitals per patient reviewed. There were no vitals taken for this visit. Constitutional: He appears well-developed and well-nourished. No acute distress. No respiratory distress. Skin: Skin appears to be warm and dry. No rashes or any other marks noted. He is not diaphoretic. Respiratory/Pulmonary: NO conversational dyspnea, no accessory muscle use, no coughing during exam. He does not appear to be in labored breathing. Neurological/Psychiatric:He is alert and oriented to person, place, and time. Coordination is  normal.  His mood isAppropriate and affect is Neutral/Euthymic(normal). Musculoskeletal / Extremities: Range of motion is normal. Gait is normal, assistive devices use: none. IMPRESSION:   1. Chronic pain syndrome    2. Postlaminectomy syndrome, lumbar region    3. Fibromyalgia    4. DDD (degenerative disc disease), lumbar    5. Lumbar radiculitis    6. Lumbar spondylosis    7. Failed neck syndrome    8. Brachial neuritis or radiculitis        PLAN:  Informed verbal consent regarding treatment was obtained  [place] was obtained and reviewed  for the last one year and no indicators of drug misuse  were found. Any other controlled substance prescriptions  seen on the record have been accounted for, I am aware of the patient receiving these medications. Cate Davis [place] record will be rechecked as part of office protocol.    -continue with current opioid regimen Percocet 4 per day  -Adv Biofeedback, relaxation and meditation techniques. Referral to psychologist for CBT was also discussed with patient  -back/neck stretching exercises as advised     Current Outpatient Medications   Medication Sig Dispense Refill    oxyCODONE-acetaminophen (PERCOCET) 7.5-325 MG per tablet Take 1 tablet by mouth every 6 hours as needed for Pain (max 4 per day) for up to 30 days.  120 tablet 0    zolpidem (AMBIEN) 10 MG tablet TAKE ONE TABLET BY MOUTH ONCE NIGHTLY AS NEEDED FOR SLEEP 30 tablet 0    diclofenac (VOLTAREN) 75 MG EC tablet TAKE ONE TABLET BY MOUTH TWICE A DAY 60 tablet 0    Naloxone HCl (EVZIO) 2 MG/0.4ML SOAJ Use as directed 1 Package 0    ALPRAZolam (XANAX) 0.5 MG tablet Take 0.5 mg by mouth nightly as needed. No current facility-administered medications for this visit. I will continue his current medication regimen  which is part of the above treatment schedule. It has been helping with Mr. Amy Cheney chronic  medical problems which for this visit include:   Diagnoses of Chronic pain syndrome, Postlaminectomy syndrome, lumbar region, Fibromyalgia, DDD (degenerative disc disease), lumbar, Lumbar radiculitis, Lumbar spondylosis, Failed neck syndrome, and Brachial neuritis or radiculitis were pertinent to this visit. Risks and benefits of the medications and other alternative treatments  including no treatment were discussed with the patient. The common side effects of these medications were also explained to the patient. Informed verbal consent was obtained. Goals of current treatment regimen include improvement in pain, restoration of functioning- with focus on improvement in physical performance, general activity, work or disability,emotional distress, health care utilization and  decreased medication consumption. Will continue to monitor progress towards achieving/maintaining therapeutic goals with special emphasis on  1. Improvement in perceived interfernce  of pain with ADL's. Ability to do home exercises independently. Ability to do household chores indoor and/or outdoor work and social and leisure activities. Improve psychosocial and physical functioning. - he is showing progression towards this treatment goal with the current regimen. He was advised against drinking alcohol with the narcotic pain medicines, advised against driving or handling machinery while adjusting the dose of medicines or if having cognitive  issues related to the current medications. Risk of overdose and death, if medicines not taken as prescribed, were also discussed. If the patient develops new symptoms or if the symptoms worsen, the patient should call the office. While transcribing every attempt was made to maintain the accuracy of the note in terms of it's contents,there may have been some errors made inadvertently. Thank you for allowing me to participate in the care of this patient.     Pablo Blount MD.    Cc: Mary Galindo MD

## 2020-08-17 ENCOUNTER — OFFICE VISIT (OUTPATIENT)
Dept: PAIN MANAGEMENT | Age: 60
End: 2020-08-17
Payer: COMMERCIAL

## 2020-08-17 VITALS
TEMPERATURE: 98.2 F | WEIGHT: 205 LBS | HEART RATE: 90 BPM | SYSTOLIC BLOOD PRESSURE: 154 MMHG | DIASTOLIC BLOOD PRESSURE: 100 MMHG | BODY MASS INDEX: 22.52 KG/M2

## 2020-08-17 PROCEDURE — 99213 OFFICE O/P EST LOW 20 MIN: CPT | Performed by: INTERNAL MEDICINE

## 2020-08-17 RX ORDER — OXYCODONE AND ACETAMINOPHEN 7.5; 325 MG/1; MG/1
1 TABLET ORAL EVERY 6 HOURS PRN
Qty: 120 TABLET | Refills: 0 | Status: SHIPPED | OUTPATIENT
Start: 2020-08-17 | End: 2020-09-21 | Stop reason: SDUPTHER

## 2020-08-17 RX ORDER — DICLOFENAC SODIUM 75 MG/1
TABLET, DELAYED RELEASE ORAL
Qty: 60 TABLET | Refills: 0 | Status: SHIPPED | OUTPATIENT
Start: 2020-08-17 | End: 2020-09-21 | Stop reason: SDUPTHER

## 2020-08-17 RX ORDER — ZOLPIDEM TARTRATE 10 MG/1
TABLET ORAL
Qty: 30 TABLET | Refills: 0 | Status: SHIPPED | OUTPATIENT
Start: 2020-08-17 | End: 2020-09-21 | Stop reason: SDUPTHER

## 2020-08-17 NOTE — PROGRESS NOTES
SLEEP 30 tablet 0    diclofenac (VOLTAREN) 75 MG EC tablet TAKE ONE TABLET BY MOUTH TWICE A DAY 60 tablet 0     No facility-administered medications prior to visit. SOCIAL/FAMILY/PAST MEDICAL HISTORY: Mr. Dayanna Hendrickson, family and past medical history was reviewed. REVIEW OF SYSTEMS:    Respiratory: Negative for apnea, chest tightness and shortness of breath or change in baseline breathing. Gastrointestinal: Negative for nausea, vomiting, abdominal pain, diarrhea, constipation, blood in stool and abdominal distention. PHYSICAL EXAM:   Nursing note and vitals reviewed. BP (!) 154/100   Pulse 90   Temp 98.2 °F (36.8 °C) (Temporal)   Wt 205 lb (93 kg)   BMI 22.52 kg/m²   Constitutional: He appears well-developed and well-nourished. No acute distress. Skin: Skin is warm and dry, good turgor. No rash noted. He is not diaphoretic. Cardiovascular: Normal rate, regular rhythm, normal heart sounds, and does not have murmur. Pulmonary/Chest: Effort normal. No respiratory distress. He does not have wheezes in the lung fields. He has no rales. Neurological/Psychiatric:He is alert and oriented to person, place, and time. Coordination is  normal.  His mood isAppropriate and affect is Neutral/Euthymic(normal) . His    IMPRESSION:   1. Chronic pain syndrome    2. Fibromyalgia    3. DDD (degenerative disc disease), lumbar    4. Postlaminectomy syndrome, lumbar region    5. Failed neck syndrome    6. Lumbar radiculitis    7. Lumbar spondylosis    8.  Brachial neuritis or radiculitis        PLAN:  Informed verbal consent was obtained  -He was advised to increase fluids ( 5-7  glasses of fluid daily), limit caffeine, avoid cheese products, increase dietary fiber, increase activity and exercise as tolerated and relax regularly and enjoy meals   -walking as tolerated   -neck/back exercises as advised  -continue with Percocet 4 per day along with Voltaren  -Urine drug screen with GC/MS for opiates and drugs of abuse was ordered and will follow up on results. Current Outpatient Medications   Medication Sig Dispense Refill    oxyCODONE-acetaminophen (PERCOCET) 7.5-325 MG per tablet Take 1 tablet by mouth every 6 hours as needed for Pain (max 4 per day) for up to 30 days. 120 tablet 0    zolpidem (AMBIEN) 10 MG tablet TAKE ONE TABLET BY MOUTH ONCE NIGHTLY AS NEEDED FOR SLEEP 30 tablet 0    diclofenac (VOLTAREN) 75 MG EC tablet TAKE ONE TABLET BY MOUTH TWICE A DAY 60 tablet 0    Naloxone HCl (EVZIO) 2 MG/0.4ML SOAJ Use as directed 1 Package 0    ALPRAZolam (XANAX) 0.5 MG tablet Take 0.5 mg by mouth nightly as needed. No current facility-administered medications for this visit. I will continue his current medication regimen  which is part of the above treatment schedule. It has been helping with Mr. Puneet Francis chronic  medical problems which for this visit include:   Diagnoses of Chronic pain syndrome, Fibromyalgia, DDD (degenerative disc disease), lumbar, Postlaminectomy syndrome, lumbar region, Failed neck syndrome, Lumbar radiculitis, Lumbar spondylosis, and Brachial neuritis or radiculitis were pertinent to this visit. Risks and benefits of the medications and other alternative treatments  including no treatment were discussed with the patient. The common side effects of these medications were also explained to the patient. Informed verbal consent was obtained. Goals of current treatment regimen include improvement in pain, restoration of functioning- with focus on improvement in physical performance, general activity, work or disability,emotional distress, health care utilization and  decreased medication consumption. Will continue to monitor progress towards achieving/maintaining therapeutic goals with special emphasis on  1. Improvement in perceived interfernce  of pain with ADL's. Ability to do home exercises independently.  Ability to do household chores indoor and/or outdoor work and social and leisure activities. Improve psychosocial and physical functioning. - he is showing progression towards this treatment goal with the current regimen. He was advised against drinking alcohol with the narcotic pain medicines, advised against driving or handling machinery while adjusting the dose of medicines or if having cognitive  issues related to the current medications. Risk of overdose and death, if medicines not taken as prescribed, were also discussed. If the patient develops new symptoms or if the symptoms worsen, the patient should call the office. While transcribing every attempt was made to maintain the accuracy of the note in terms of it's contents,there may have been some errors made inadvertently. Thank you for allowing me to participate in the care of this patient.     Joselyn Acosta MD.    Cc: Alejandro Robles MD

## 2020-09-21 ENCOUNTER — VIRTUAL VISIT (OUTPATIENT)
Dept: PAIN MANAGEMENT | Age: 60
End: 2020-09-21
Payer: COMMERCIAL

## 2020-09-21 PROCEDURE — 99213 OFFICE O/P EST LOW 20 MIN: CPT | Performed by: INTERNAL MEDICINE

## 2020-09-21 RX ORDER — OXYCODONE AND ACETAMINOPHEN 7.5; 325 MG/1; MG/1
1 TABLET ORAL EVERY 6 HOURS PRN
Qty: 120 TABLET | Refills: 0 | Status: SHIPPED | OUTPATIENT
Start: 2020-09-21 | End: 2020-10-19 | Stop reason: SDUPTHER

## 2020-09-21 RX ORDER — DICLOFENAC SODIUM 75 MG/1
TABLET, DELAYED RELEASE ORAL
Qty: 60 TABLET | Refills: 1 | Status: SHIPPED | OUTPATIENT
Start: 2020-09-21 | End: 2020-10-19 | Stop reason: SDUPTHER

## 2020-09-21 RX ORDER — ZOLPIDEM TARTRATE 10 MG/1
TABLET ORAL
Qty: 30 TABLET | Refills: 0 | Status: SHIPPED | OUTPATIENT
Start: 2020-09-21 | End: 2020-10-19 | Stop reason: SDUPTHER

## 2020-09-21 NOTE — PROGRESS NOTES
TELE HEALTH VISIT (AUDIO-VISUAL)    Pursuant to the emergency declaration under the [facility], [facility] waiver authority and the [employer] and [employer] Act, this Virtual  Visit was conducted, with patient's/legal guardian's consent, to reduce the patient's risk of exposure to COVID-19 and provide continuity of care for an established patient. Service is  provided through a video synchronous discussion virtually to substitute for in-person clinic visit. Due to this being a TeleHealth encounter (During [place] public health emergency), evaluation of the following organ systems was limited: Vitals/Constitutional/EENT/Resp/CV/GI//MS/Neuro/Skin/Dodz-Ccxq-Glc. Jean Davidson  1960  <[id number]>    Mr. Estrellita Matos is being seen virtually for a follow up visit using one of the following techniques  Google Duo  Face time  Informed verbal consent to the virtual visit was obtained from Mr. Estrellita Matos. Risks associated with HIPPA compliance with the virtual visit was explained to the patient. Mr. Estrellita Matos is at his residence and Dr. London Roland is in his office. HISTORY OF PRESENT ILLNESS:  Mr. Estrellita Matos is a 62 y.o. male  being assessed for a follow up visit for pain management for evaluation of ongoing care regarding his symptoms and monitoring of compliance with long term use high risk medications. He has a diagnosis of   1. Brachial neuritis or radiculitis    2. Chronic pain syndrome    3. Fibromyalgia    4. DDD (degenerative disc disease), lumbar    5. Postlaminectomy syndrome, lumbar region    6. Failed neck syndrome    7. Lumbar radiculitis    8. Lumbar spondylosis    . He complains of pain in the neck, lower back  with radiation to the shoulders Bilateral . He rates the pain 4/10 and describes it as sharp, aching. Current treatment regimen has helped relieve about 60% of the pain.   He denies any side effects from the current pain regimen. Patient reports that since the last follow up visit the physical functioning is unchanged, family/social relationships are unchanged, mood is unchanged sleep patterns are unchanged, and that the overall functioning is unchanged. Patient denies misusing/abusing his narcotic pain medications or using any illegal drugs. There are No indicators for possible drug abuse, addiction or diversion problems. Mr. Ady Phipps states he has been doing fair. Patient states he is working full time still. He mentions he is using all his adjuvants. Patient says he is using Voltaren PRN along with Ambien and Percocet 4 per day. ALLERGIES: Patients list of allergies were reviewed     MEDICATIONS: Mr. Ady Phipps list of medications were reviewed. His current medications are   Outpatient Medications Prior to Visit   Medication Sig Dispense Refill    diclofenac (VOLTAREN) 75 MG EC tablet TAKE ONE TABLET BY MOUTH TWICE A DAY 60 tablet 0    Naloxone HCl (EVZIO) 2 MG/0.4ML SOAJ Use as directed 1 Package 0    ALPRAZolam (XANAX) 0.5 MG tablet Take 0.5 mg by mouth nightly as needed. No facility-administered medications prior to visit. SOCIAL/FAMILY/PAST MEDICAL HISTORY: Mr. Marguerite Maldonado, family and past medical history was reviewed. REVIEW OF SYSTEMS:    Respiratory: Negative for apnea, chest tightness and shortness of breath or change in baseline breathing. Gastrointestinal: Negative for nausea, vomiting, abdominal pain, diarrhea, constipation, blood in stool and abdominal distention. PHYSICAL EXAM:   Nursing note and vitals per patient reviewed. There were no vitals taken for this visit. Constitutional: He appears well-developed and well-nourished. No acute distress. No respiratory distress. Skin: Skin appears to be warm and dry. No rashes or any other marks noted. He is not diaphoretic.   Respiratory/Pulmonary: NO conversational dyspnea, no accessory muscle use, no coughing during exam. He does not appear to be in labored breathing. Neurological/Psychiatric:He is alert and oriented to person, place, and time. Coordination is  normal.  His mood isAppropriate and affect is Neutral/Euthymic(normal). Musculoskeletal / Extremities: Range of motion is normal. Gait is normal, assistive devices use: none. IMPRESSION:   1. Chronic pain syndrome    2. Fibromyalgia    3. DDD (degenerative disc disease), lumbar    4. Postlaminectomy syndrome, lumbar region    5. Failed neck syndrome    6. Lumbar radiculitis    7. Lumbar spondylosis    8. Brachial neuritis or radiculitis        PLAN:  Informed verbal consent regarding treatment was obtained  -continue with current opioid regimen Percocet 4 per day  -He was advised to increase fluids ( 5-7  glasses of fluid daily), limit caffeine, avoid cheese products, increase dietary fiber, increase activity and exercise as tolerated and relax regularly and enjoy meals   -Patient's urine drug screen results with GC/MS confirmation were obtained and reviewed and were negative for any illicit drugs. Prescribed medications were within acceptable range.   -back stretching exercises as advised    Current Outpatient Medications   Medication Sig Dispense Refill    diclofenac (VOLTAREN) 75 MG EC tablet TAKE ONE TABLET BY MOUTH TWICE A DAY 60 tablet 0    Naloxone HCl (EVZIO) 2 MG/0.4ML SOAJ Use as directed 1 Package 0    ALPRAZolam (XANAX) 0.5 MG tablet Take 0.5 mg by mouth nightly as needed. No current facility-administered medications for this visit. I will continue his current medication regimen  which is part of the above treatment schedule. It has been helping with Mr. Pierre Poole chronic  medical problems which for this visit include: The primary encounter diagnosis was Brachial neuritis or radiculitis.  Diagnoses of Chronic pain syndrome, Fibromyalgia, DDD (degenerative disc disease), lumbar, Postlaminectomy syndrome, lumbar region, Failed neck syndrome, Lumbar radiculitis, and Lumbar spondylosis were also pertinent to this visit. Risks and benefits of the medications and other alternative treatments  including no treatment were discussed with the patient. The common side effects of these medications were also explained to the patient. Informed verbal consent was obtained. Goals of current treatment regimen include improvement in pain, restoration of functioning- with focus on improvement in physical performance, general activity, work or disability,emotional distress, health care utilization and  decreased medication consumption. Will continue to monitor progress towards achieving/maintaining therapeutic goals with special emphasis on  1. Improvement in perceived interfernce  of pain with ADL's. Ability to do home exercises independently. Ability to do household chores indoor and/or outdoor work and social and leisure activities. Improve psychosocial and physical functioning. - he is showing progression towards this treatment goal with the current regimen. 2. Ability to focus/concentrate at work and perform the duties required of him at work  Sit through a workday without lower extremity symptoms. Stand 30-60 minutes without lower extremity symptoms. Ability to lift up to 10-20 lbs. Ability to go up and down stairs. Sit 30-60 minutes  Without having to stand up frequently. - he is maintaining/progressing towards his work related goals with the current regimen. He was advised against drinking alcohol with the narcotic pain medicines, advised against driving or handling machinery while adjusting the dose of medicines or if having cognitive  issues related to the current medications. Risk of overdose and death, if medicines not taken as prescribed, were also discussed. If the patient develops new symptoms or if the symptoms worsen, the patient should call the office.     While transcribing every attempt was made to maintain the accuracy of the note in terms of it's contents,there may have been some errors made inadvertently. Thank you for allowing me to participate in the care of this patient.     Adrian Sultana MD.    Cc: Jeny Damian MD

## 2020-10-19 ENCOUNTER — VIRTUAL VISIT (OUTPATIENT)
Dept: PAIN MANAGEMENT | Age: 60
End: 2020-10-19
Payer: COMMERCIAL

## 2020-10-19 PROCEDURE — 99213 OFFICE O/P EST LOW 20 MIN: CPT | Performed by: INTERNAL MEDICINE

## 2020-10-19 RX ORDER — ZOLPIDEM TARTRATE 10 MG/1
TABLET ORAL
Qty: 30 TABLET | Refills: 0 | Status: SHIPPED | OUTPATIENT
Start: 2020-10-19 | End: 2020-11-16 | Stop reason: SDUPTHER

## 2020-10-19 RX ORDER — OXYCODONE AND ACETAMINOPHEN 7.5; 325 MG/1; MG/1
1 TABLET ORAL EVERY 6 HOURS PRN
Qty: 120 TABLET | Refills: 0 | Status: SHIPPED | OUTPATIENT
Start: 2020-10-19 | End: 2020-11-16 | Stop reason: SDUPTHER

## 2020-10-19 RX ORDER — DICLOFENAC SODIUM 75 MG/1
TABLET, DELAYED RELEASE ORAL
Qty: 60 TABLET | Refills: 1 | Status: SHIPPED | OUTPATIENT
Start: 2020-10-19 | End: 2020-12-17 | Stop reason: SDUPTHER

## 2020-10-19 NOTE — PROGRESS NOTES
TELE HEALTH VISIT (AUDIO-VISUAL)    Pursuant to the emergency declaration under the [facility] waiver authority and the [employer] and [employer] Act, this Virtual  Visit was conducted, with patient's/legal guardian's consent, to reduce the patient's risk of exposure to COVID-19 and provide continuity of care for an established patient. Service is  provided through a video synchronous discussion virtually to substitute for in-person clinic visit. Due to this being a TeleHealth encounter (During [facility]- public health emergency), evaluation of the following organ systems was limited: Vitals/Constitutional/EENT/Resp/CV/GI//MS/Neuro/Skin/Epsx-Hzew-Tox. Trisha Joya  1960  <[id number]>    Mr. Jarod Sanchez is being seen virtually for a follow up visit using one of the following techniques  Face time  Informed verbal consent to the virtual visit was obtained from Mr. Jarod Sanchez. Risks associated with HIPPA compliance with the virtual visit was explained to the patient. Mr. Jarod Sanchez is at his residence and Dr. Raymundo Thomson is in his office. HISTORY OF PRESENT ILLNESS:  Mr. Jarod Sanchez is a 62 y.o. male  being assessed for a follow up visit for pain management for evaluation of ongoing care regarding his symptoms and monitoring of compliance with long term use high risk medications. He has a diagnosis of   1. Chronic pain syndrome    2. Fibromyalgia    3. DDD (degenerative disc disease), lumbar    4. Lumbar radiculitis    5. Failed neck syndrome    6. Brachial neuritis or radiculitis    7. Lumbar spondylosis    . He complains of pain in the lower back  with radiation to the neck and shoulders Bilateral . He rates the pain 4/10 and describes it as sharp, aching. Current treatment regimen has helped relieve about 60% of the pain. He denies any side effects from the current pain regimen.  Patient reports that since the last follow up visit the physical functioning is unchanged, family/social relationships are unchanged, mood is unchanged sleep patterns are unchanged, and that the overall functioning is unchanged. Patient denies misusing/abusing his narcotic pain medications or using any illegal drugs. There are No indicators for possible drug abuse, addiction or diversion problems. Mr. Jas Whitlock states he has been doing fair. He reports he is working full time still. Patient states he is using Percocet 4 per day. He states his neck and back pain is tolerable. He reports he is not doing much physical work. Patient denies any constipation symptoms. ALLERGIES: Patients list of allergies were reviewed     MEDICATIONS: Mr. Jas Whitlock list of medications were reviewed. His current medications are   Outpatient Medications Prior to Visit   Medication Sig Dispense Refill    diclofenac (VOLTAREN) 75 MG EC tablet TAKE ONE TABLET BY MOUTH TWICE A DAY 60 tablet 1    oxyCODONE-acetaminophen (PERCOCET) 7.5-325 MG per tablet Take 1 tablet by mouth every 6 hours as needed for Pain (max 4 per day) for up to 30 days. 120 tablet 0    zolpidem (AMBIEN) 10 MG tablet TAKE ONE TABLET BY MOUTH ONCE NIGHTLY AS NEEDED FOR SLEEP 30 tablet 0    Naloxone HCl (EVZIO) 2 MG/0.4ML SOAJ Use as directed 1 Package 0    ALPRAZolam (XANAX) 0.5 MG tablet Take 0.5 mg by mouth nightly as needed. No facility-administered medications prior to visit. SOCIAL/FAMILY/PAST MEDICAL HISTORY: Mr. Inez Farr, family and past medical history was reviewed. REVIEW OF SYSTEMS:    Respiratory: Negative for apnea, chest tightness and shortness of breath or change in baseline breathing. Gastrointestinal: Negative for nausea, vomiting, abdominal pain, diarrhea, constipation, blood in stool and abdominal distention. PHYSICAL EXAM:   Nursing note and vitals per patient reviewed. There were no vitals taken for this visit.    Constitutional: He appears well-developed and well-nourished. No acute distress. No respiratory distress. Skin: Skin appears to be warm and dry. No rashes or any other marks noted. He is not diaphoretic. Respiratory/Pulmonary: NO conversational dyspnea, no accessory muscle use, no coughing during exam. He does not appear to be in labored breathing. Neurological/Psychiatric:He is alert and oriented to person, place, and time. Coordination is  normal.  His mood isAppropriate and affect is Neutral/Euthymic(normal). Musculoskeletal / Extremities: Range of motion is normal. Gait is normal, assistive devices use: none. IMPRESSION:   1. Chronic pain syndrome    2. Fibromyalgia    3. DDD (degenerative disc disease), lumbar    4. Lumbar radiculitis    5. Failed neck syndrome    6. Brachial neuritis or radiculitis    7. Lumbar spondylosis    8. Postlaminectomy syndrome, lumbar region        PLAN:  Informed verbal consent regarding treatment was obtained  -continue with current opioid regimen Percocet 4 per day  -Adv Biofeedback, relaxation and meditation techniques. Referral to psychologist for CBT was also discussed with patient  -He was advised to increase fluids ( 5-7  glasses of fluid daily), limit caffeine, avoid cheese products, increase dietary fiber, increase activity and exercise as tolerated and relax regularly and enjoy meals   -advised to walk 30 minutes daily as tolerated  -Labs ordered CBC, CMP, and TSH. Current Outpatient Medications   Medication Sig Dispense Refill    diclofenac (VOLTAREN) 75 MG EC tablet TAKE ONE TABLET BY MOUTH TWICE A DAY 60 tablet 1    oxyCODONE-acetaminophen (PERCOCET) 7.5-325 MG per tablet Take 1 tablet by mouth every 6 hours as needed for Pain (max 4 per day) for up to 30 days.  120 tablet 0    zolpidem (AMBIEN) 10 MG tablet TAKE ONE TABLET BY MOUTH ONCE NIGHTLY AS NEEDED FOR SLEEP 30 tablet 0    Naloxone HCl (EVZIO) 2 MG/0.4ML SOAJ Use as directed 1 Package 0    ALPRAZolam (XANAX) 0.5 MG tablet Take 0.5 mg by the note in terms of it's contents,there may have been some errors made inadvertently. Thank you for allowing me to participate in the care of this patient.     Bernetta Boxer, MD.    Cc: Adela Montano MD

## 2020-11-16 ENCOUNTER — VIRTUAL VISIT (OUTPATIENT)
Dept: PAIN MANAGEMENT | Age: 60
End: 2020-11-16
Payer: COMMERCIAL

## 2020-11-16 ENCOUNTER — TELEPHONE (OUTPATIENT)
Dept: PAIN MANAGEMENT | Age: 60
End: 2020-11-16

## 2020-11-16 PROCEDURE — 99213 OFFICE O/P EST LOW 20 MIN: CPT | Performed by: INTERNAL MEDICINE

## 2020-11-16 RX ORDER — OXYCODONE AND ACETAMINOPHEN 7.5; 325 MG/1; MG/1
1 TABLET ORAL EVERY 6 HOURS PRN
Qty: 120 TABLET | Refills: 0 | Status: SHIPPED | OUTPATIENT
Start: 2020-11-16 | End: 2020-12-17 | Stop reason: SDUPTHER

## 2020-11-16 RX ORDER — ZOLPIDEM TARTRATE 10 MG/1
TABLET ORAL
Qty: 30 TABLET | Refills: 0 | Status: SHIPPED | OUTPATIENT
Start: 2020-11-16 | End: 2020-12-17 | Stop reason: SDUPTHER

## 2020-11-16 NOTE — PROGRESS NOTES
TELE HEALTH VISIT (AUDIO-VISUAL)    Pursuant to the emergency declaration under the [facility], [facility] waiver authority and the [employer] Act, this Virtual  Visit was conducted, with patient's/legal guardian's consent, to reduce the patient's risk of exposure to COVID-19 and provide continuity of care for an established patient. Service is  provided through a video synchronous discussion virtually to substitute for in-person clinic visit. Due to this being a TeleHealth encounter (During QJM-03 public health emergency), evaluation of the following organ systems was limited: Vitals/Constitutional/EENT/Resp/CV/GI//MS/Neuro/Skin/Chhc-Uirr-Sil. Lauryn Gamma  1960  <[id number]>    Mr. Inge Doe is being seen virtually for a follow up visit using one of the following techniques  Face time   Informed verbal consent to the virtual visit was obtained from Mr. Inge Doe. Risks associated with HIPPA compliance with the virtual visit was explained to the patient. Mr. Inge Doe is at his residence and Dr. Gladis Newberry is in his office. HISTORY OF PRESENT ILLNESS:  Mr. Inge Doe is a 62 y.o. male  being assessed for a follow up visit for pain management for evaluation of ongoing care regarding his symptoms and monitoring of compliance with long term use high risk medications. He has a diagnosis of   1. Chronic pain syndrome    2. Fibromyalgia    3. DDD (degenerative disc disease), lumbar    4. Lumbar radiculitis    5. Failed neck syndrome    6. Brachial neuritis or radiculitis    7. Lumbar spondylosis    8. Postlaminectomy syndrome, lumbar region    . He complains of pain in the neck and low back   with radiation to the shoulders Bilateral . He rates the pain 4/10 and describes it as sharp, aching, burning, numbness, pins and needles. Current treatment regimen has helped relieve about 50% of the pain.   He denies any side effects from the current pain regimen. Patient reports that since the last follow up visit the physical functioning is unchanged, family/social relationships are unchanged, mood is unchanged sleep patterns are unchanged, and that the overall functioning is unchanged. Patient denies misusing/abusing his narcotic pain medications or using any illegal drugs. There are No indicators for possible drug abuse, addiction or diversion problems. Patient states he has been doing fair. He mentions he has been working full time still. He says he has been using Percocet along with Voltaren and Ambien. He complains his neck and upper back hurts. He denies any constipation symptoms. Patient states his sleep is fair. Has fairly normal sleep latency. Averages about 4-6 hours of sleep a night. Denies any signs of sleep apnea. Feels somewhat rested in the morning. ALLERGIES: Patients list of allergies were reviewed     MEDICATIONS: Mr. Jered Gregory list of medications were reviewed. His current medications are   Outpatient Medications Prior to Visit   Medication Sig Dispense Refill    diclofenac (VOLTAREN) 75 MG EC tablet TAKE ONE TABLET BY MOUTH TWICE A DAY 60 tablet 1    oxyCODONE-acetaminophen (PERCOCET) 7.5-325 MG per tablet Take 1 tablet by mouth every 6 hours as needed for Pain (max 4 per day) for up to 30 days. 120 tablet 0    zolpidem (AMBIEN) 10 MG tablet TAKE ONE TABLET BY MOUTH ONCE NIGHTLY AS NEEDED FOR SLEEP 30 tablet 0    Naloxone HCl (EVZIO) 2 MG/0.4ML SOAJ Use as directed 1 Package 0    ALPRAZolam (XANAX) 0.5 MG tablet Take 0.5 mg by mouth nightly as needed. No facility-administered medications prior to visit. SOCIAL/FAMILY/PAST MEDICAL HISTORY: Mr. Dom Renee, family and past medical history was reviewed. REVIEW OF SYSTEMS:    Respiratory: Negative for apnea, chest tightness and shortness of breath or change in baseline breathing.     Gastrointestinal: Negative for nausea, vomiting, abdominal pain, diarrhea, constipation, blood in stool and abdominal distention. PHYSICAL EXAM:   Nursing note and vitals per patient reviewed. There were no vitals taken for this visit. Constitutional: He appears well-developed and well-nourished. No acute distress. No respiratory distress. Skin: Skin appears to be warm and dry. No rashes or any other marks noted. He is not diaphoretic. Respiratory/Pulmonary: NO conversational dyspnea, no accessory muscle use, no coughing during exam. He does not appear to be in labored breathing. Neurological/Psychiatric:He is alert and oriented to person, place, and time. Coordination is  normal.  His mood isAppropriate and affect is Anxious. Musculoskeletal / Extremities: Range of motion is normal. Gait is normal, assistive devices use: none. IMPRESSION:   1. Chronic pain syndrome    2. Fibromyalgia    3. DDD (degenerative disc disease), lumbar    4. Lumbar radiculitis    5. Failed neck syndrome    6. Brachial neuritis or radiculitis    7. Lumbar spondylosis    8. Postlaminectomy syndrome, lumbar region        PLAN:  Informed verbal consent regarding treatment was obtained  -[place] record was obtained and reviewed  for the last one year and no indicators of drug misuse  were found. Any other controlled substance prescriptions  seen on the record have been accounted for, I am aware of the patient receiving these medications. Car Morgan  [place] record will be rechecked as part of office protocol.    -Continue with current opioid regimen Percocet 4 per day   -He was advised to increase fluids ( 5-7  glasses of fluid daily), limit caffeine, avoid cheese products, increase dietary fiber, increase activity and exercise as tolerated and relax regularly and enjoy meals   -he was advised proper sleep hygiene-told to avoid:use of caffeine or other stimulants after noon, alcohol use near bedtime, long or frequent naps during the day, erratic sleep schedule, heavy meals near bedtime, vigorous exercise near bedtime and use of electronic devices near bedtime    Current Outpatient Medications   Medication Sig Dispense Refill    diclofenac (VOLTAREN) 75 MG EC tablet TAKE ONE TABLET BY MOUTH TWICE A DAY 60 tablet 1    oxyCODONE-acetaminophen (PERCOCET) 7.5-325 MG per tablet Take 1 tablet by mouth every 6 hours as needed for Pain (max 4 per day) for up to 30 days. 120 tablet 0    zolpidem (AMBIEN) 10 MG tablet TAKE ONE TABLET BY MOUTH ONCE NIGHTLY AS NEEDED FOR SLEEP 30 tablet 0    Naloxone HCl (EVZIO) 2 MG/0.4ML SOAJ Use as directed 1 Package 0    ALPRAZolam (XANAX) 0.5 MG tablet Take 0.5 mg by mouth nightly as needed. No current facility-administered medications for this visit. I will continue his current medication regimen  which is part of the above treatment schedule. It has been helping with Mr. Eva Espino chronic  medical problems which for this visit include:   Diagnoses of Chronic pain syndrome, Fibromyalgia, DDD (degenerative disc disease), lumbar, Lumbar radiculitis, Failed neck syndrome, Brachial neuritis or radiculitis, Lumbar spondylosis, and Postlaminectomy syndrome, lumbar region were pertinent to this visit. Risks and benefits of the medications and other alternative treatments  including no treatment were discussed with the patient. The common side effects of these medications were also explained to the patient. Informed verbal consent was obtained. Goals of current treatment regimen include improvement in pain, restoration of functioning- with focus on improvement in physical performance, general activity, work or disability,emotional distress, health care utilization and  decreased medication consumption. Will continue to monitor progress towards achieving/maintaining therapeutic goals with special emphasis on  1. Improvement in perceived interfernce  of pain with ADL's. Ability to do home exercises independently.  Ability to do household chores indoor and/or outdoor work and social and leisure activities. Improve psychosocial and physical functioning. - he is showing progression towards this treatment goal with the current regimen. He was advised against drinking alcohol with the narcotic pain medicines, advised against driving or handling machinery while adjusting the dose of medicines or if having cognitive  issues related to the current medications. Risk of overdose and death, if medicines not taken as prescribed, were also discussed. If the patient develops new symptoms or if the symptoms worsen, the patient should call the office. While transcribing every attempt was made to maintain the accuracy of the note in terms of it's contents,there may have been some errors made inadvertently. Thank you for allowing me to participate in the care of this patient.     Mary Kay Means MD.    Cc: Renzo Weathers MD

## 2020-12-17 ENCOUNTER — VIRTUAL VISIT (OUTPATIENT)
Dept: PAIN MANAGEMENT | Age: 60
End: 2020-12-17
Payer: COMMERCIAL

## 2020-12-17 PROCEDURE — 99213 OFFICE O/P EST LOW 20 MIN: CPT | Performed by: INTERNAL MEDICINE

## 2020-12-17 RX ORDER — OXYCODONE AND ACETAMINOPHEN 7.5; 325 MG/1; MG/1
1 TABLET ORAL EVERY 6 HOURS PRN
Qty: 120 TABLET | Refills: 0 | Status: SHIPPED | OUTPATIENT
Start: 2020-12-17 | End: 2021-01-14 | Stop reason: SDUPTHER

## 2020-12-17 RX ORDER — ZOLPIDEM TARTRATE 10 MG/1
TABLET ORAL
Qty: 30 TABLET | Refills: 0 | Status: SHIPPED | OUTPATIENT
Start: 2020-12-17 | End: 2021-01-14 | Stop reason: SDUPTHER

## 2020-12-17 RX ORDER — DICLOFENAC SODIUM 75 MG/1
TABLET, DELAYED RELEASE ORAL
Qty: 60 TABLET | Refills: 1 | Status: SHIPPED | OUTPATIENT
Start: 2020-12-17 | End: 2021-02-11 | Stop reason: SDUPTHER

## 2020-12-17 NOTE — PROGRESS NOTES
REVIEW OF SYSTEMS:    Respiratory: Negative for apnea, chest tightness and shortness of breath or change in baseline breathing. PHYSICAL EXAM:   Nursing note and vitals reviewed. There were no vitals taken for this visit. Constitutional: He appears well-developed and well-nourished. No acute distress. Cardiovascular: Normal rate, regular rhythm, normal heart sounds, and does not have murmur. Pulmonary/Chest: Effort normal. No respiratory distress. He does not have wheezes in the lung fields. He has no rales. Neurological/Psychiatric:He is alert and oriented to person, place, and time. Coordination is  normal.  His mood isAppropriate and affect is Neutral/Euthymic(normal) . His    IMPRESSION:   1. Chronic pain syndrome    2. Fibromyalgia    3. DDD (degenerative disc disease), lumbar    4. Lumbar radiculitis    5. Failed neck syndrome    6. Brachial neuritis or radiculitis    7. Lumbar spondylosis    8. Postlaminectomy syndrome, lumbar region        PLAN:  Informed verbal consent was obtained  -continue with current opioid regimen Percocet 4 per day  -Adv Biofeedback, relaxation and meditation techniques. Referral to psychologist for CBT was also discussed with patient   -Continue with all other adjuvant medications as before   -back stretching exercises as advised      Current Outpatient Medications   Medication Sig Dispense Refill    diclofenac (VOLTAREN) 75 MG EC tablet TAKE ONE TABLET BY MOUTH TWICE A DAY 60 tablet 1    Naloxone HCl (EVZIO) 2 MG/0.4ML SOAJ Use as directed 1 Package 0    ALPRAZolam (XANAX) 0.5 MG tablet Take 0.5 mg by mouth nightly as needed. No current facility-administered medications for this visit. I will continue his current medication regimen  which is part of the above treatment schedule.  It has been helping with Mr. Bryant  chronic  medical problems which for this visit include:

## 2020-12-19 ENCOUNTER — APPOINTMENT (OUTPATIENT)
Dept: CT IMAGING | Age: 60
End: 2020-12-19
Payer: COMMERCIAL

## 2020-12-19 ENCOUNTER — HOSPITAL ENCOUNTER (EMERGENCY)
Age: 60
Discharge: HOME | End: 2020-12-19
Payer: COMMERCIAL

## 2020-12-19 VITALS
HEIGHT: 74 IN | BODY MASS INDEX: 26.95 KG/M2 | DIASTOLIC BLOOD PRESSURE: 98 MMHG | WEIGHT: 210 LBS | HEART RATE: 70 BPM | RESPIRATION RATE: 16 BRPM | SYSTOLIC BLOOD PRESSURE: 150 MMHG | OXYGEN SATURATION: 94 % | TEMPERATURE: 98.1 F

## 2020-12-19 PROCEDURE — 99283 EMERGENCY DEPT VISIT LOW MDM: CPT

## 2020-12-19 PROCEDURE — 72125 CT NECK SPINE W/O DYE: CPT

## 2020-12-19 RX ORDER — CYCLOBENZAPRINE HCL 10 MG
10 TABLET ORAL 3 TIMES DAILY PRN
Qty: 21 TABLET | Refills: 0 | Status: SHIPPED | OUTPATIENT
Start: 2020-12-19 | End: 2020-12-29

## 2020-12-19 RX ORDER — DICLOFENAC SODIUM 75 MG/1
75 TABLET, DELAYED RELEASE ORAL 2 TIMES DAILY
Qty: 14 TABLET | Refills: 0 | Status: SHIPPED | OUTPATIENT
Start: 2020-12-19 | End: 2021-01-14

## 2020-12-19 ASSESSMENT — PAIN SCALES - GENERAL: PAINLEVEL_OUTOF10: 8

## 2020-12-19 NOTE — ED NOTES
Pt wife given copy of Visitor Policy. Pt did take copy. Pt placed ED number in phone.      Iliana Bueno LPN  [phone]
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
## 2021-01-14 ENCOUNTER — VIRTUAL VISIT (OUTPATIENT)
Dept: PAIN MANAGEMENT | Age: 61
End: 2021-01-14
Payer: COMMERCIAL

## 2021-01-14 DIAGNOSIS — M47.816 LUMBAR SPONDYLOSIS: ICD-10-CM

## 2021-01-14 DIAGNOSIS — M54.16 LUMBAR RADICULITIS: ICD-10-CM

## 2021-01-14 DIAGNOSIS — M96.1 FAILED NECK SYNDROME: ICD-10-CM

## 2021-01-14 DIAGNOSIS — M96.1 POSTLAMINECTOMY SYNDROME, LUMBAR REGION: ICD-10-CM

## 2021-01-14 DIAGNOSIS — G89.4 CHRONIC PAIN SYNDROME: ICD-10-CM

## 2021-01-14 DIAGNOSIS — M79.7 FIBROMYALGIA: ICD-10-CM

## 2021-01-14 DIAGNOSIS — M54.12 BRACHIAL NEURITIS OR RADICULITIS: ICD-10-CM

## 2021-01-14 DIAGNOSIS — M51.36 DDD (DEGENERATIVE DISC DISEASE), LUMBAR: ICD-10-CM

## 2021-01-14 PROCEDURE — 99213 OFFICE O/P EST LOW 20 MIN: CPT | Performed by: INTERNAL MEDICINE

## 2021-01-14 RX ORDER — ZOLPIDEM TARTRATE 10 MG/1
TABLET ORAL
Qty: 30 TABLET | Refills: 0 | Status: SHIPPED | OUTPATIENT
Start: 2021-01-14 | End: 2021-02-11 | Stop reason: SDUPTHER

## 2021-01-14 RX ORDER — OXYCODONE AND ACETAMINOPHEN 7.5; 325 MG/1; MG/1
1 TABLET ORAL EVERY 6 HOURS PRN
Qty: 120 TABLET | Refills: 0 | Status: SHIPPED | OUTPATIENT
Start: 2021-01-14 | End: 2021-02-11 | Stop reason: SDUPTHER
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
## 2021-01-29 ENCOUNTER — HOSPITAL ENCOUNTER (OUTPATIENT)
Dept: GENERAL RADIOLOGY | Age: 61
Discharge: HOME | End: 2021-01-29
Payer: COMMERCIAL

## 2021-01-29 ENCOUNTER — HOSPITAL ENCOUNTER (OUTPATIENT)
Age: 61
Discharge: HOME | End: 2021-01-29
Payer: COMMERCIAL

## 2021-01-29 DIAGNOSIS — M54.2 CERVICALGIA: ICD-10-CM

## 2021-01-29 DIAGNOSIS — M54.40 LOW BACK PAIN WITH SCIATICA, SCIATICA LATERALITY UNSPECIFIED, UNSPECIFIED BACK PAIN LATERALITY, UNSPECIFIED CHRONICITY: ICD-10-CM

## 2021-01-29 PROCEDURE — 72070 X-RAY EXAM THORAC SPINE 2VWS: CPT

## 2021-01-29 PROCEDURE — 72050 X-RAY EXAM NECK SPINE 4/5VWS: CPT

## 2021-01-29 PROCEDURE — 72110 X-RAY EXAM L-2 SPINE 4/>VWS: CPT

## 2021-02-11 ENCOUNTER — VIRTUAL VISIT (OUTPATIENT)
Dept: PAIN MANAGEMENT | Age: 61
End: 2021-02-11
Payer: COMMERCIAL

## 2021-02-11 DIAGNOSIS — M96.1 POSTLAMINECTOMY SYNDROME, LUMBAR REGION: ICD-10-CM

## 2021-02-11 DIAGNOSIS — M54.16 LUMBAR RADICULITIS: ICD-10-CM

## 2021-02-11 DIAGNOSIS — M96.1 FAILED NECK SYNDROME: ICD-10-CM

## 2021-02-11 DIAGNOSIS — M79.7 FIBROMYALGIA: ICD-10-CM

## 2021-02-11 DIAGNOSIS — M54.12 BRACHIAL NEURITIS OR RADICULITIS: ICD-10-CM

## 2021-02-11 DIAGNOSIS — G89.4 CHRONIC PAIN SYNDROME: ICD-10-CM

## 2021-02-11 DIAGNOSIS — M51.36 DDD (DEGENERATIVE DISC DISEASE), LUMBAR: ICD-10-CM

## 2021-02-11 DIAGNOSIS — M47.816 LUMBAR SPONDYLOSIS: ICD-10-CM

## 2021-02-11 PROCEDURE — 99213 OFFICE O/P EST LOW 20 MIN: CPT | Performed by: INTERNAL MEDICINE

## 2021-02-11 RX ORDER — OXYCODONE AND ACETAMINOPHEN 7.5; 325 MG/1; MG/1
1 TABLET ORAL EVERY 6 HOURS PRN
Qty: 120 TABLET | Refills: 0 | Status: SHIPPED | OUTPATIENT
Start: 2021-02-11 | End: 2021-03-11 | Stop reason: SDUPTHER

## 2021-02-11 RX ORDER — ZOLPIDEM TARTRATE 10 MG/1
TABLET ORAL
Qty: 30 TABLET | Refills: 0 | Status: SHIPPED | OUTPATIENT
Start: 2021-02-11 | End: 2021-03-11 | Stop reason: SDUPTHER

## 2021-02-11 RX ORDER — DICLOFENAC SODIUM 75 MG/1
TABLET, DELAYED RELEASE ORAL
Qty: 60 TABLET | Refills: 1 | Status: SHIPPED | OUTPATIENT
Start: 2021-02-11 | End: 2021-03-11 | Stop reason: SDUPTHER

## 2021-02-11 NOTE — PROGRESS NOTES
TELE HEALTH VISIT (AUDIO-VISUAL)    Pursuant to the emergency declaration under the [facility], [facility] waiver authority and the [employer] and [employer] Act, this Virtual  Visit was conducted, with patient's/legal guardian's consent, to reduce the patient's risk of exposure to COVID-19 and provide continuity of care for an established patient. Service is  provided through a video synchronous discussion virtually to substitute for in-person clinic visit. Due to this being a TeleHealth encounter (During ZWL-91 public [facility] emergency), evaluation of the following organ systems was limited: Vitals/Constitutional/EENT/Resp/CV/GI//MS/Neuro/Skin/Bdtp-Ektp-Mic. Rodrigue Infante  1960  <[id number]>    Mr. Jesús Guidry is being seen virtually for a follow up visit using one of the following techniques  Face time  Informed verbal consent to the virtual visit was obtained from Mr. Jesús Guidry. Risks associated with HIPPA compliance with the virtual visit was explained to the patient. Mr. Jesús Guidry is at his residence and Dr. Dee Mazariegos is in his office. HISTORY OF PRESENT ILLNESS:  Mr. Jesús Guidry is a 62 y.o. male returns for a follow up visit for multiple medical problems. His current presenting problems are   1. Chronic pain syndrome    2. Fibromyalgia    3. DDD (degenerative disc disease), lumbar    4. Lumbar radiculitis    5. Failed neck syndrome    6. Brachial neuritis or radiculitis    7. Lumbar spondylosis    . As per information/history obtained from the PADT(patient assessment and documentation tool) - He complains of pain in the neck and lower back with radiation to the hands Left and feet Left He rates the pain 8/10 and describes it as stabbing. Pain is made worse by: nothing. Current treatment regimen has helped relieve about 60% of the pain. He denies side effects from the current pain regimen. Patient reports that since the last follow up visit the physical functioning is unchanged, family/social relationships are unchanged, mood is unchanged and sleep patterns are unchanged, and that the overall functioning is unchanged. Patient denies neurological bowel or bladder. Patient denies misusing/abusing his narcotic pain medications or using any illegal drugs. There are No indicators for possible drug abuse, addiction or diversion problems. Upon obtaining the medical history from Mr. Lashell Jackson regarding today's office visit for his presenting problems, patient states she has been okay, pain has been manageable with the medications. ALLERGIES: Patients list of allergies were reviewed     MEDICATIONS: Mr. Lashell Jackson list of medications were reviewed. His current medications are   Outpatient Medications Prior to Visit   Medication Sig Dispense Refill    oxyCODONE-acetaminophen (PERCOCET) 7.5-325 MG per tablet Take 1 tablet by mouth every 6 hours as needed for Pain (max 4 per day) for up to 30 days. 120 tablet 0    zolpidem (AMBIEN) 10 MG tablet TAKE ONE TABLET BY MOUTH ONCE NIGHTLY AS NEEDED FOR SLEEP 30 tablet 0    diclofenac (VOLTAREN) 75 MG EC tablet TAKE ONE TABLET BY MOUTH TWICE A DAY 60 tablet 1    Naloxone HCl (EVZIO) 2 MG/0.4ML SOAJ Use as directed 1 Package 0    ALPRAZolam (XANAX) 0.5 MG tablet Take 0.5 mg by mouth nightly as needed. No facility-administered medications prior to visit. REVIEW OF SYSTEMS:    Respiratory: Negative for apnea, chest tightness and shortness of breath or change in baseline breathing. PHYSICAL EXAM:   Nursing note and vitals reviewed. There were no vitals taken for this visit. Constitutional: He appears well-developed and well-nourished. No acute distress. Cardiovascular: Normal rate, regular rhythm, normal heart sounds, and does not have murmur. Pulmonary/Chest: Effort normal. No respiratory distress. He does not have wheezes in the lung fields. He has no rales. Neurological/Psychiatric:He is alert and oriented to person, place, and time. Coordination is  normal.  His mood isAppropriate and affect is Neutral/Euthymic(normal) . His    IMPRESSION:   1. Chronic pain syndrome    2. Fibromyalgia    3. DDD (degenerative disc disease), lumbar    4. Lumbar radiculitis    5. Failed neck syndrome    6. Brachial neuritis or radiculitis    7. Lumbar spondylosis    8. Postlaminectomy syndrome, lumbar region        PLAN:  Informed verbal consent was obtained  -continue with current opioid regimen Percocet 4 per day  -follow up with spine surgeon   -Continue with current adjuvant medications, Voltaren and Ambien PRN for insomnia     Current Outpatient Medications   Medication Sig Dispense Refill    oxyCODONE-acetaminophen (PERCOCET) 7.5-325 MG per tablet Take 1 tablet by mouth every 6 hours as needed for Pain (max 4 per day) for up to 30 days. 120 tablet 0    zolpidem (AMBIEN) 10 MG tablet TAKE ONE TABLET BY MOUTH ONCE NIGHTLY AS NEEDED FOR SLEEP 30 tablet 0    diclofenac (VOLTAREN) 75 MG EC tablet TAKE ONE TABLET BY MOUTH TWICE A DAY 60 tablet 1    Naloxone HCl (EVZIO) 2 MG/0.4ML SOAJ Use as directed 1 Package 0    ALPRAZolam (XANAX) 0.5 MG tablet Take 0.5 mg by mouth nightly as needed. No current facility-administered medications for this visit. I will continue his current medication regimen  which is part of the above treatment schedule. It has been helping with Mr. Luke Junior chronic  medical problems which for this visit include:   Diagnoses of Chronic pain syndrome, Fibromyalgia, DDD (degenerative disc disease), lumbar, Lumbar radiculitis, Failed neck syndrome, Brachial neuritis or radiculitis, and Lumbar spondylosis were pertinent to this visit. Risks and benefits of the medications and other alternative treatments  including no treatment were discussed with the patient. The common side effects of these medications were also explained to the patient.   Informed verbal consent was obtained. Goals of current treatment regimen include improvement in pain, restoration of functioning- with focus on improvement in physical performance, general activity, work or disability,emotional distress, health care utilization and  decreased medication consumption. Will continue to monitor progress towards achieving/maintaining therapeutic goals with special emphasis on  1. Improvement in perceived interfernce  of pain with ADL's. Ability to do home exercises independently. Ability to do household chores indoor and/or outdoor work and social and leisure activities. Improve psychosocial and physical functioning. - he is showing progression towards this treatment goal with the current regimen. He was advised against drinking alcohol with the narcotic pain medicines, advised against driving or handling machinery while adjusting the dose of medicines or if having cognitive  issues related to the current medications. Risk of overdose and death, if medicines not taken as prescribed, were also discussed. If the patient develops new symptoms or if the symptoms worsen, the patient should call the office. While transcribing every attempt was made to maintain the accuracy of the note in terms of it's contents,there may have been some errors made inadvertently. Thank you for allowing me to participate in the care of this patient.     Sujatha Tejeda MD.    Cc: Gary Post MD

## 2021-03-11 ENCOUNTER — VIRTUAL VISIT (OUTPATIENT)
Dept: PAIN MANAGEMENT | Age: 61
End: 2021-03-11
Payer: COMMERCIAL

## 2021-03-11 DIAGNOSIS — M54.12 BRACHIAL NEURITIS OR RADICULITIS: ICD-10-CM

## 2021-03-11 DIAGNOSIS — M96.1 POSTLAMINECTOMY SYNDROME, LUMBAR REGION: ICD-10-CM

## 2021-03-11 DIAGNOSIS — M54.16 LUMBAR RADICULITIS: ICD-10-CM

## 2021-03-11 DIAGNOSIS — M47.816 LUMBAR SPONDYLOSIS: ICD-10-CM

## 2021-03-11 DIAGNOSIS — G89.4 CHRONIC PAIN SYNDROME: ICD-10-CM

## 2021-03-11 DIAGNOSIS — M51.36 DDD (DEGENERATIVE DISC DISEASE), LUMBAR: ICD-10-CM

## 2021-03-11 DIAGNOSIS — M96.1 FAILED NECK SYNDROME: ICD-10-CM

## 2021-03-11 DIAGNOSIS — M79.7 FIBROMYALGIA: ICD-10-CM

## 2021-03-11 PROCEDURE — 99213 OFFICE O/P EST LOW 20 MIN: CPT | Performed by: INTERNAL MEDICINE

## 2021-03-11 RX ORDER — ZOLPIDEM TARTRATE 10 MG/1
TABLET ORAL
Qty: 30 TABLET | Refills: 0 | Status: SHIPPED | OUTPATIENT
Start: 2021-03-11 | End: 2021-04-08 | Stop reason: SDUPTHER

## 2021-03-11 RX ORDER — DICLOFENAC SODIUM 75 MG/1
TABLET, DELAYED RELEASE ORAL
Qty: 60 TABLET | Refills: 1 | Status: SHIPPED | OUTPATIENT
Start: 2021-03-11 | End: 2021-04-08 | Stop reason: SDUPTHER

## 2021-03-11 RX ORDER — OXYCODONE AND ACETAMINOPHEN 7.5; 325 MG/1; MG/1
1 TABLET ORAL EVERY 6 HOURS PRN
Qty: 112 TABLET | Refills: 0 | Status: SHIPPED | OUTPATIENT
Start: 2021-03-11 | End: 2021-04-08 | Stop reason: SDUPTHER
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
## 2021-04-08 ENCOUNTER — VIRTUAL VISIT (OUTPATIENT)
Dept: PAIN MANAGEMENT | Age: 61
End: 2021-04-08
Payer: COMMERCIAL

## 2021-04-08 DIAGNOSIS — M51.36 DDD (DEGENERATIVE DISC DISEASE), LUMBAR: ICD-10-CM

## 2021-04-08 DIAGNOSIS — G89.4 CHRONIC PAIN SYNDROME: ICD-10-CM

## 2021-04-08 DIAGNOSIS — M47.816 LUMBAR SPONDYLOSIS: ICD-10-CM

## 2021-04-08 DIAGNOSIS — M79.7 FIBROMYALGIA: ICD-10-CM

## 2021-04-08 DIAGNOSIS — M96.1 POSTLAMINECTOMY SYNDROME, LUMBAR REGION: ICD-10-CM

## 2021-04-08 DIAGNOSIS — M54.16 LUMBAR RADICULITIS: ICD-10-CM

## 2021-04-08 DIAGNOSIS — M96.1 FAILED NECK SYNDROME: ICD-10-CM

## 2021-04-08 DIAGNOSIS — M54.12 BRACHIAL NEURITIS OR RADICULITIS: ICD-10-CM

## 2021-04-08 PROCEDURE — 99213 OFFICE O/P EST LOW 20 MIN: CPT | Performed by: INTERNAL MEDICINE

## 2021-04-08 RX ORDER — DICLOFENAC SODIUM 75 MG/1
TABLET, DELAYED RELEASE ORAL
Qty: 60 TABLET | Refills: 1 | Status: SHIPPED | OUTPATIENT
Start: 2021-04-08 | End: 2021-05-10 | Stop reason: SDUPTHER

## 2021-04-08 RX ORDER — OXYCODONE AND ACETAMINOPHEN 7.5; 325 MG/1; MG/1
1 TABLET ORAL EVERY 6 HOURS PRN
Qty: 120 TABLET | Refills: 0 | Status: SHIPPED | OUTPATIENT
Start: 2021-04-08 | End: 2021-05-10 | Stop reason: SDUPTHER

## 2021-04-08 RX ORDER — ZOLPIDEM TARTRATE 10 MG/1
TABLET ORAL
Qty: 30 TABLET | Refills: 0 | Status: SHIPPED | OUTPATIENT
Start: 2021-04-08 | End: 2021-05-25 | Stop reason: SDUPTHER

## 2021-04-08 NOTE — PROGRESS NOTES
40% of the pain. He denies side effects from the current pain regimen. Patient reports that since the last follow up visit the physical functioning is unchanged, family/social relationships are unchanged, mood is unchanged and sleep patterns are unchanged, and that the overall functioning is unchanged. Patient denies neurological bowel or bladder. Patient denies misusing/abusing his narcotic pain medications or using any illegal drugs. There are No indicators for possible drug abuse, addiction or diversion problems. Upon obtaining the medical history from Mr. Nichole Wing regarding today's office visit for his presenting problems, patient states he has been doing fair. He states his surgery on his back was postponed. he states he is using Percocet along with Ambien and Voltaren. Patient denies any side effects with the medications. Patient reports he is working full time still. He is complaining of his back hurting worse than his legs. ALLERGIES: Patients list of allergies were reviewed     MEDICATIONS: Mr. Nichole Wing list of medications were reviewed. His current medications are   Outpatient Medications Prior to Visit   Medication Sig Dispense Refill    oxyCODONE-acetaminophen (PERCOCET) 7.5-325 MG per tablet Take 1 tablet by mouth every 6 hours as needed for Pain (max 4 per day) for up to 28 days. 112 tablet 0    zolpidem (AMBIEN) 10 MG tablet TAKE ONE TABLET BY MOUTH ONCE NIGHTLY AS NEEDED FOR SLEEP 30 tablet 0    diclofenac (VOLTAREN) 75 MG EC tablet TAKE ONE TABLET BY MOUTH TWICE A DAY 60 tablet 1    Naloxone HCl (EVZIO) 2 MG/0.4ML SOAJ Use as directed 1 Package 0    ALPRAZolam (XANAX) 0.5 MG tablet Take 0.5 mg by mouth nightly as needed. No facility-administered medications prior to visit. REVIEW OF SYSTEMS:    Respiratory: Negative for apnea, chest tightness and shortness of breath or change in baseline breathing. PHYSICAL EXAM:   Nursing note and vitals reviewed.  There were no the symptoms worsen, the patient should call the office. While transcribing every attempt was made to maintain the accuracy of the note in terms of it's contents,there may have been some errors made inadvertently. Thank you for allowing me to participate in the care of this patient.     Lawanda Carlson MD.    Cc: Kalpana Millan MD

## 2021-04-22 ENCOUNTER — TELEPHONE (OUTPATIENT)
Dept: PAIN MANAGEMENT | Age: 61
End: 2021-04-22

## 2021-04-22 NOTE — TELEPHONE ENCOUNTER
Per [facility] okay to fill Rx early     Samaria CONTI called and spoke to Ted SILVA at [employer] and gave the Okay to fill Rx early since he is leaving out of town. Patient was notified and he voiced his understanding.

## 2021-04-22 NOTE — TELEPHONE ENCOUNTER
Patient needs ok for early fill for his RX zolpidem (AMBIEN) 10 MG tablet. Patient states he is leaving tomorrow morning for [place] and his Rx is not due to fill until Sunday. Pl's advise patient when done.

## 2021-05-10 ENCOUNTER — VIRTUAL VISIT (OUTPATIENT)
Dept: PAIN MANAGEMENT | Age: 61
End: 2021-05-10
Payer: COMMERCIAL

## 2021-05-10 DIAGNOSIS — M54.16 LUMBAR RADICULITIS: ICD-10-CM

## 2021-05-10 DIAGNOSIS — M54.12 BRACHIAL NEURITIS OR RADICULITIS: ICD-10-CM

## 2021-05-10 DIAGNOSIS — M96.1 POSTLAMINECTOMY SYNDROME, LUMBAR REGION: ICD-10-CM

## 2021-05-10 DIAGNOSIS — G89.4 CHRONIC PAIN SYNDROME: ICD-10-CM

## 2021-05-10 DIAGNOSIS — M79.7 FIBROMYALGIA: ICD-10-CM

## 2021-05-10 DIAGNOSIS — M51.36 DDD (DEGENERATIVE DISC DISEASE), LUMBAR: ICD-10-CM

## 2021-05-10 DIAGNOSIS — M47.816 LUMBAR SPONDYLOSIS: ICD-10-CM

## 2021-05-10 DIAGNOSIS — M96.1 FAILED NECK SYNDROME: ICD-10-CM

## 2021-05-10 PROCEDURE — 99213 OFFICE O/P EST LOW 20 MIN: CPT | Performed by: INTERNAL MEDICINE

## 2021-05-10 RX ORDER — DICLOFENAC SODIUM 75 MG/1
TABLET, DELAYED RELEASE ORAL
Qty: 60 TABLET | Refills: 1 | Status: SHIPPED | OUTPATIENT
Start: 2021-05-10 | End: 2021-06-08 | Stop reason: SDUPTHER

## 2021-05-10 RX ORDER — PREGABALIN 100 MG/1
CAPSULE ORAL
Qty: 90 CAPSULE | Refills: 0 | Status: SHIPPED | OUTPATIENT
Start: 2021-05-10 | End: 2021-06-08 | Stop reason: SINTOL

## 2021-05-10 RX ORDER — OXYCODONE AND ACETAMINOPHEN 7.5; 325 MG/1; MG/1
1 TABLET ORAL EVERY 6 HOURS PRN
Qty: 120 TABLET | Refills: 0 | Status: SHIPPED | OUTPATIENT
Start: 2021-05-10 | End: 2021-06-08 | Stop reason: SDUPTHER

## 2021-05-10 NOTE — PROGRESS NOTES
TELE HEALTH VISIT (AUDIO-VISUAL)    Pursuant to the emergency declaration under the [facility], [facility] waiver authority and the [place] and [employer] Act, this Virtual  Visit was conducted, with patient's/legal guardian's consent, to reduce the patient's risk of exposure to COVID-19 and provide continuity of care for an established patient. Service is  provided through a video synchronous discussion virtually to substitute for in-person clinic visit. Due to this being a TeleHealth encounter (During OYHCQ-64 public health emergency), evaluation of the following organ systems was limited: Vitals/Constitutional/EENT/Resp/CV/GI//MS/Neuro/Skin/Glcz-Rqge-Ocd. Imer Yoo  1960  <[id number]>    Mr. Nichole Wing is being seen virtually for a follow up visit using one of the following techniques  Face time  Informed verbal consent to the virtual visit was obtained from Mr. Nichole Wing. Risks associated with HIPPA compliance with the virtual visit was explained to the patient. Mr. Nichole Wing is at his residence and Dr. Mayank Tse is in his office. HISTORY OF PRESENT ILLNESS:  Mr. Nichole Wing is a 62 y.o. male returns for a follow up visit for multiple medical problems. His current presenting problems are   1. Chronic pain syndrome    2. Fibromyalgia    3. DDD (degenerative disc disease), lumbar    4. Lumbar spondylosis    5. Failed neck syndrome    6. Brachial neuritis or radiculitis    7. Lumbar radiculitis    8. Postlaminectomy syndrome, lumbar region    . As per information/history obtained from the PADT(patient assessment and documentation tool) - He complains of pain in the hands Bilateral and lower back with radiation to the feet Bilateral He rates the pain 6/10 and describes it as stabbing. Pain is made worse by: nothing. Current treatment regimen has helped relieve about 40% of the pain.   He denies side effects from the current pain regimen. Patient reports that since the last follow up visit the physical functioning is unchanged, family/social relationships are unchanged, mood is unchanged and sleep patterns are unchanged, and that the overall functioning is unchanged. Patient denies neurological bowel or bladder. Patient denies misusing/abusing his narcotic pain medications or using any illegal drugs. There are No indicators for possible drug abuse, addiction or diversion problems. Upon obtaining the medical history from Mr. Holly Arnold regarding today's office visit for his presenting problems, patient states he has been doing fair, his pain has been somewhat tolerable with the medications. He states he still having pain in the back and neck. Patient states he is using Percocet which is helping some of the pain. ALLERGIES: Patients list of allergies were reviewed     MEDICATIONS: Mr. Holly Arnold list of medications were reviewed. His current medications are   Outpatient Medications Prior to Visit   Medication Sig Dispense Refill    diclofenac (VOLTAREN) 75 MG EC tablet TAKE ONE TABLET BY MOUTH TWICE A DAY 60 tablet 1    Naloxone HCl (EVZIO) 2 MG/0.4ML SOAJ Use as directed 1 Package 0    ALPRAZolam (XANAX) 0.5 MG tablet Take 0.5 mg by mouth nightly as needed. No facility-administered medications prior to visit. REVIEW OF SYSTEMS:    Respiratory: Negative for apnea, chest tightness and shortness of breath or change in baseline breathing. PHYSICAL EXAM:   Nursing note and vitals reviewed. There were no vitals taken for this visit. Constitutional: He appears well-developed and well-nourished. No acute distress. Cardiovascular: Normal rate, regular rhythm, normal heart sounds, and does not have murmur. Pulmonary/Chest: Effort normal. No respiratory distress. He does not have wheezes in the lung fields. He has no rales. Neurological/Psychiatric:He is alert and oriented to person, place, and time. achieving/maintaining therapeutic goals with special emphasis on  1. Improvement in perceived interfernce  of pain with ADL's. Ability to do home exercises independently. Ability to do household chores indoor and/or outdoor work and social and leisure activities. Improve psychosocial and physical functioning. - he is showing progression towards this treatment goal with the current regimen. He was advised against drinking alcohol with the narcotic pain medicines, advised against driving or handling machinery while adjusting the dose of medicines or if having cognitive  issues related to the current medications. Risk of overdose and death, if medicines not taken as prescribed, were also discussed. If the patient develops new symptoms or if the symptoms worsen, the patient should call the office. While transcribing every attempt was made to maintain the accuracy of the note in terms of it's contents,there may have been some errors made inadvertently. Thank you for allowing me to participate in the care of this patient.     Celi Dixon MD.    Cc: Joesph Jett MD

## 2021-05-24 ENCOUNTER — TELEPHONE (OUTPATIENT)
Dept: PAIN MANAGEMENT | Age: 61
End: 2021-05-24

## 2021-05-24 DIAGNOSIS — G89.4 CHRONIC PAIN SYNDROME: ICD-10-CM

## 2021-05-24 NOTE — TELEPHONE ENCOUNTER
Patient states his RX zolpidem (AMBIEN) 10 MG tablet was not sent to the [facility] please advise.

## 2021-05-25 RX ORDER — ZOLPIDEM TARTRATE 10 MG/1
TABLET ORAL
Qty: 30 TABLET | Refills: 0 | OUTPATIENT
Start: 2021-05-25 | End: 2021-06-08 | Stop reason: SDUPTHER

## 2021-06-08 ENCOUNTER — VIRTUAL VISIT (OUTPATIENT)
Dept: PAIN MANAGEMENT | Age: 61
End: 2021-06-08

## 2021-06-08 DIAGNOSIS — M51.36 DDD (DEGENERATIVE DISC DISEASE), LUMBAR: ICD-10-CM

## 2021-06-08 DIAGNOSIS — M47.816 LUMBAR SPONDYLOSIS: ICD-10-CM

## 2021-06-08 DIAGNOSIS — M54.12 BRACHIAL NEURITIS OR RADICULITIS: ICD-10-CM

## 2021-06-08 DIAGNOSIS — G89.4 CHRONIC PAIN SYNDROME: ICD-10-CM

## 2021-06-08 DIAGNOSIS — M54.16 LUMBAR RADICULITIS: ICD-10-CM

## 2021-06-08 DIAGNOSIS — M79.7 FIBROMYALGIA: ICD-10-CM

## 2021-06-08 DIAGNOSIS — M96.1 POSTLAMINECTOMY SYNDROME, LUMBAR REGION: ICD-10-CM

## 2021-06-08 DIAGNOSIS — M96.1 FAILED NECK SYNDROME: ICD-10-CM

## 2021-06-08 PROCEDURE — 99213 OFFICE O/P EST LOW 20 MIN: CPT | Performed by: INTERNAL MEDICINE

## 2021-06-08 RX ORDER — ZOLPIDEM TARTRATE 10 MG/1
TABLET ORAL
Qty: 30 TABLET | Refills: 0 | Status: SHIPPED | OUTPATIENT
Start: 2021-06-08 | End: 2021-07-08 | Stop reason: SDUPTHER

## 2021-06-08 RX ORDER — DICLOFENAC SODIUM 75 MG/1
TABLET, DELAYED RELEASE ORAL
Qty: 60 TABLET | Refills: 1 | Status: SHIPPED | OUTPATIENT
Start: 2021-06-08 | End: 2021-07-08 | Stop reason: SDUPTHER

## 2021-06-08 RX ORDER — OXYCODONE AND ACETAMINOPHEN 7.5; 325 MG/1; MG/1
1 TABLET ORAL EVERY 6 HOURS PRN
Qty: 120 TABLET | Refills: 0 | Status: SHIPPED | OUTPATIENT
Start: 2021-06-08 | End: 2021-07-08 | Stop reason: SDUPTHER

## 2021-06-08 NOTE — PROGRESS NOTES
TELE HEALTH VISIT (AUDIO-VISUAL)    Pursuant to the emergency declaration under the [address], [facility] waiver authority and the [employer] and [employer] Act, this Virtual  Visit was conducted, with patient's/legal guardian's consent, to reduce the patient's risk of exposure to COVID-19 and provide continuity of care for an established patient. Service is  provided through a video synchronous discussion virtually to substitute for in-person clinic visit. Due to this being a TeleHealth encounter (During IIC-09 public health emergency), evaluation of the following organ systems was limited: Vitals/Constitutional/EENT/Resp/CV/GI//MS/Neuro/Skin/Mzrj-Libs-Yru. Imer Yoo  1960  <[id number]>    Mr. Nichole Wing is being seen virtually for a follow up visit using one of the following techniques  Google Duo, Face time or Doxy. me  Informed verbal consent to the virtual visit was obtained from Mr. Nichole Wing. Risks associated with HIPPA compliance with the virtual visit was explained to the patient. Mr. Nichole Wing is at his residence and Dr. Mayank Tse is in his office. HISTORY OF PRESENT ILLNESS:  Mr. Nichole Wing is a 62 y.o. male returns for a follow up visit for multiple medical problems. His current presenting problems are   1. Chronic pain syndrome    2. Fibromyalgia    3. DDD (degenerative disc disease), lumbar    4. Lumbar spondylosis    5. Failed neck syndrome    6. Brachial neuritis or radiculitis    7. Lumbar radiculitis    8. Postlaminectomy syndrome, lumbar region    . As per information/history obtained from the PADT(patient assessment and documentation tool) - He complains of pain in the neck and lower back with radiation to the hands Left, lower leg Right, ankles Right and feet Right He rates the pain 5/10 and describes it as sharp, aching, burning, numbness, tingling, pins and needles.   Pain is made worse by: movement, walking, standing, sitting, bending, lifting. Current treatment regimen has helped relieve about 50% of the pain. He denies side effects from the current pain regimen. Patient reports that since the last follow up visit the physical functioning is unchanged, family/social relationships are unchanged, mood is unchanged and sleep patterns are unchanged, and that the overall functioning is unchanged. Patient denies neurological bowel or bladder. Patient denies misusing/abusing his narcotic pain medications or using any illegal drugs. There are No indicators for possible drug abuse, addiction or diversion problems. Upon obtaining the medical history from Mr. Alek Lopez regarding today's office visit for his presenting problems, patient states he has been doing fair, pain has been about the same. He reports she had side effects with they Lyrica,quit taking it. He mentions he is using Percocet 4 per day along with Ambien. He reports he has been working full time, busy. ALLERGIES: Patients list of allergies were reviewed     MEDICATIONS: Mr. Alek Lopez list of medications were reviewed. His current medications are   Outpatient Medications Prior to Visit   Medication Sig Dispense Refill    Naloxone HCl (EVZIO) 2 MG/0.4ML SOAJ Use as directed 1 Package 0    ALPRAZolam (XANAX) 0.5 MG tablet Take 0.5 mg by mouth nightly as needed.  zolpidem (AMBIEN) 10 MG tablet TAKE ONE TABLET BY MOUTH ONCE NIGHTLY AS NEEDED FOR SLEEP 30 tablet 0    diclofenac (VOLTAREN) 75 MG EC tablet TAKE ONE TABLET BY MOUTH TWICE A DAY 60 tablet 1    oxyCODONE-acetaminophen (PERCOCET) 7.5-325 MG per tablet Take 1 tablet by mouth every 6 hours as needed for Pain (max 4 per day) for up to 30 days. 120 tablet 0    pregabalin (LYRICA) 100 MG capsule One tab hs 1 week, 2 tabs hs 1 week, 1 tab am 2 tabs pm 90 capsule 0     No facility-administered medications prior to visit.         REVIEW OF SYSTEMS:    Respiratory: Negative for apnea, chest tightness and shortness of breath or change in baseline breathing. PHYSICAL EXAM:   Nursing note and vitals reviewed. There were no vitals taken for this visit. Constitutional: He appears well-developed and well-nourished. No acute distress. Cardiovascular: Normal rate, regular rhythm, normal heart sounds, and has murmur. Pulmonary/Chest: Effort normal. No respiratory distress. He does not have wheezes in the lung fields. He has no rales. Neurological/Psychiatric:He is alert and oriented to person, place, and time. Coordination is  normal.  His mood isAppropriate and affect is Neutral/Euthymic(normal) . IMPRESSION:   1. Chronic pain syndrome    2. Fibromyalgia    3. DDD (degenerative disc disease), lumbar    4. Lumbar spondylosis    5. Failed neck syndrome    6. Brachial neuritis or radiculitis    7. Lumbar radiculitis    8. Postlaminectomy syndrome, lumbar region        PLAN:  Informed verbal consent was obtained  -Continue with current opioid regimen Medication. Percocet 4 per day   -He was advised to increase fluids ( 5-7  glasses of fluid daily), limit caffeine, avoid cheese products, increase dietary fiber, increase activity and exercise as tolerated and relax regularly and enjoy meals   -Discontinue Lyrica. -Back sstretcing eddexercisesas advised   Current Outpatient Medications   Medication Sig Dispense Refill    zolpidem (AMBIEN) 10 MG tablet TAKE ONE TABLET BY MOUTH ONCE NIGHTLY AS NEEDED FOR SLEEP 30 tablet 0    diclofenac (VOLTAREN) 75 MG EC tablet TAKE ONE TABLET BY MOUTH TWICE A DAY 60 tablet 1    oxyCODONE-acetaminophen (PERCOCET) 7.5-325 MG per tablet Take 1 tablet by mouth every 6 hours as needed for Pain (max 4 per day) for up to 30 days. 120 tablet 0    Naloxone HCl (EVZIO) 2 MG/0.4ML SOAJ Use as directed 1 Package 0    ALPRAZolam (XANAX) 0.5 MG tablet Take 0.5 mg by mouth nightly as needed. No current facility-administered medications for this visit.      I will continue his current medication regimen  which is part of the above treatment schedule. It has been helping with Mr. Fabiola Astorga chronic  medical problems which for this visit include:   Diagnoses of Chronic pain syndrome, Fibromyalgia, DDD (degenerative disc disease), lumbar, Lumbar spondylosis, Failed neck syndrome, Brachial neuritis or radiculitis, Lumbar radiculitis, and Postlaminectomy syndrome, lumbar region were pertinent to this visit. Risks and benefits of the medications and other alternative treatments  including no treatment were discussed with the patient. The common side effects of these medications were also explained to the patient. Informed verbal consent was obtained. Goals of current treatment regimen include improvement in pain, restoration of functioning- with focus on improvement in physical performance, general activity, work or disability,emotional distress, health care utilization and  decreased medication consumption. Will continue to monitor progress towards achieving/maintaining therapeutic goals with special emphasis on  1. Improvement in perceived interfernce  of pain with ADL's. Ability to do home exercises independently. Ability to do household chores indoor and/or outdoor work and social and leisure activities. Improve psychosocial and physical functioning. - he is showing progression towards this treatment goal with the current regimen. rsmp   2. Ability to focus/concentrate at work and perform the duties required of him at work  Sit through a workday without lower extremity symptoms. Stand 30-60 minutes without lower extremity symptoms. Ability to lift up to 10-20 lbs. Ability to go up and down stairs. Sit 30-60 minutes  Without having to stand up frequently. - he is maintaining/progressing towards his work related goals with the current regimen.      He was advised against drinking alcohol with the narcotic pain medicines, advised against driving or handling machinery while

## 2021-07-08 ENCOUNTER — OFFICE VISIT (OUTPATIENT)
Dept: PAIN MANAGEMENT | Age: 61
End: 2021-07-08
Payer: COMMERCIAL

## 2021-07-08 VITALS
HEART RATE: 74 BPM | SYSTOLIC BLOOD PRESSURE: 151 MMHG | DIASTOLIC BLOOD PRESSURE: 94 MMHG | TEMPERATURE: 98.4 F | WEIGHT: 218.8 LBS | BODY MASS INDEX: 28.09 KG/M2

## 2021-07-08 DIAGNOSIS — M96.1 POSTLAMINECTOMY SYNDROME, LUMBAR REGION: ICD-10-CM

## 2021-07-08 DIAGNOSIS — M96.1 FAILED NECK SYNDROME: ICD-10-CM

## 2021-07-08 DIAGNOSIS — M54.16 LUMBAR RADICULITIS: ICD-10-CM

## 2021-07-08 DIAGNOSIS — M47.816 LUMBAR SPONDYLOSIS: ICD-10-CM

## 2021-07-08 DIAGNOSIS — G89.4 CHRONIC PAIN SYNDROME: ICD-10-CM

## 2021-07-08 DIAGNOSIS — M79.7 FIBROMYALGIA: ICD-10-CM

## 2021-07-08 DIAGNOSIS — M54.12 BRACHIAL NEURITIS OR RADICULITIS: ICD-10-CM

## 2021-07-08 DIAGNOSIS — M51.36 DDD (DEGENERATIVE DISC DISEASE), LUMBAR: ICD-10-CM

## 2021-07-08 PROCEDURE — 99213 OFFICE O/P EST LOW 20 MIN: CPT | Performed by: INTERNAL MEDICINE

## 2021-07-08 RX ORDER — ZOLPIDEM TARTRATE 10 MG/1
TABLET ORAL
Qty: 30 TABLET | Refills: 0 | Status: SHIPPED | OUTPATIENT
Start: 2021-07-08 | End: 2021-08-05 | Stop reason: SDUPTHER

## 2021-07-08 RX ORDER — OXYCODONE AND ACETAMINOPHEN 7.5; 325 MG/1; MG/1
1 TABLET ORAL EVERY 6 HOURS PRN
Qty: 120 TABLET | Refills: 0 | Status: SHIPPED | OUTPATIENT
Start: 2021-07-08 | End: 2021-08-05 | Stop reason: SDUPTHER

## 2021-07-08 RX ORDER — DICLOFENAC SODIUM 75 MG/1
TABLET, DELAYED RELEASE ORAL
Qty: 60 TABLET | Refills: 1 | Status: SHIPPED | OUTPATIENT
Start: 2021-07-08 | End: 2021-08-05 | Stop reason: SDUPTHER
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
## 2021-08-05 ENCOUNTER — OFFICE VISIT (OUTPATIENT)
Dept: PAIN MANAGEMENT | Age: 61
End: 2021-08-05
Payer: COMMERCIAL

## 2021-08-05 VITALS
DIASTOLIC BLOOD PRESSURE: 96 MMHG | WEIGHT: 216.2 LBS | HEART RATE: 67 BPM | SYSTOLIC BLOOD PRESSURE: 150 MMHG | BODY MASS INDEX: 27.76 KG/M2 | TEMPERATURE: 98.3 F

## 2021-08-05 DIAGNOSIS — M79.7 FIBROMYALGIA: ICD-10-CM

## 2021-08-05 DIAGNOSIS — M54.12 BRACHIAL NEURITIS OR RADICULITIS: ICD-10-CM

## 2021-08-05 DIAGNOSIS — M54.16 LUMBAR RADICULITIS: ICD-10-CM

## 2021-08-05 DIAGNOSIS — M51.36 DDD (DEGENERATIVE DISC DISEASE), LUMBAR: ICD-10-CM

## 2021-08-05 DIAGNOSIS — M96.1 POSTLAMINECTOMY SYNDROME, LUMBAR REGION: ICD-10-CM

## 2021-08-05 DIAGNOSIS — M47.816 LUMBAR SPONDYLOSIS: ICD-10-CM

## 2021-08-05 DIAGNOSIS — M96.1 FAILED NECK SYNDROME: ICD-10-CM

## 2021-08-05 DIAGNOSIS — G89.4 CHRONIC PAIN SYNDROME: ICD-10-CM

## 2021-08-05 PROCEDURE — 99213 OFFICE O/P EST LOW 20 MIN: CPT | Performed by: INTERNAL MEDICINE

## 2021-08-05 RX ORDER — OXYCODONE AND ACETAMINOPHEN 7.5; 325 MG/1; MG/1
1 TABLET ORAL EVERY 6 HOURS PRN
Qty: 120 TABLET | Refills: 0 | Status: SHIPPED | OUTPATIENT
Start: 2021-08-05 | End: 2021-09-03 | Stop reason: SDUPTHER

## 2021-08-05 RX ORDER — DICLOFENAC SODIUM 75 MG/1
75 TABLET, DELAYED RELEASE ORAL 2 TIMES DAILY
Qty: 60 TABLET | Refills: 1 | Status: SHIPPED | OUTPATIENT
Start: 2021-08-05 | End: 2021-12-01 | Stop reason: SDUPTHER

## 2021-08-05 RX ORDER — ZOLPIDEM TARTRATE 10 MG/1
10 TABLET ORAL NIGHTLY PRN
Qty: 30 TABLET | Refills: 0 | Status: SHIPPED | OUTPATIENT
Start: 2021-08-05 | End: 2021-09-03 | Stop reason: SDUPTHER

## 2021-08-05 NOTE — PROGRESS NOTES
Darren Spencer  1960  <[id number]>      HISTORY OF PRESENT ILLNESS:  Mr. Radha Zapata is a 62 y.o. male returns for a follow up visit for pain management  He has a diagnosis of   1. Chronic pain syndrome    2. Fibromyalgia    3. DDD (degenerative disc disease), lumbar    4. Brachial neuritis or radiculitis    5. Failed neck syndrome    6. Lumbar spondylosis    7. Lumbar radiculitis    8. Postlaminectomy syndrome, lumbar region    . He complains of pain in the head with radiation to the lower back, left hand, right leg, right ankle He rates the pain 4/10 and describes it as sharp, aching, burning. Current treatment regimen has helped relieve about 60% of the pain. He denies any side effects from the current pain regimen. Patient reports that since the last follow up visit the physical functioning is unchanged, family/social relationships are unchanged, mood is unchanged sleep patterns are unchanged, and that the overall functioning is unchanged. Patient denies misusing/abusing his narcotic pain medications or using any illegal drugs. There are No indicators for possible drug abuse, addiction or diversion problems. Patient states he has been doing fair, her pain has been manageable with the medications. He mentions he is using Percocet along with Voltaren. Patient denies any constipation symptoms. Mr. Radha Zapata reports he has been managing his ADL's and working full time. Patient is complaining of his back hurting worse than his neck. ALLERGIES: Patients list of allergies were reviewed     MEDICATIONS: Mr. Radha Zapata list of medications were reviewed. His current medications are   Outpatient Medications Prior to Visit   Medication Sig Dispense Refill    zolpidem (AMBIEN) 10 MG tablet TAKE ONE TABLET BY MOUTH ONCE NIGHTLY AS NEEDED FOR SLEEP 30 tablet 0    diclofenac (VOLTAREN) 75 MG EC tablet TAKE ONE TABLET BY MOUTH TWICE A DAY 60 tablet 1    oxyCODONE-acetaminophen (PERCOCET) 7.5-325 MG per tablet Take 1 tablet by mouth every 6 hours as needed for Pain (max 4 per day) for up to 30 days. 120 tablet 0    Naloxone HCl (EVZIO) 2 MG/0.4ML SOAJ Use as directed 1 Package 0    ALPRAZolam (XANAX) 0.5 MG tablet Take 0.5 mg by mouth nightly as needed. No facility-administered medications prior to visit. REVIEW OF SYSTEMS:    Respiratory: Negative for apnea, chest tightness and shortness of breath or change in baseline breathing. PHYSICAL EXAM:   Nursing note and vitals reviewed. BP (!) 150/96   Pulse 67   Temp 98.3 °F (36.8 °C)   Wt 216 lb 3.2 oz (98.1 kg)   BMI 27.76 kg/m²   Constitutional: He appears well-developed and well-nourished. No acute distress. Cardiovascular: Normal rate, regular rhythm, normal heart sounds, and does not have murmur. Pulmonary/Chest: Effort normal. No respiratory distress. He does not have wheezes in the lung fields. He has no rales. Neurological/Psychiatric:He is alert and oriented to person, place, and time. Coordination is  normal.  His mood isAppropriate and affect is Neutral/Euthymic(normal) . His    IMPRESSION:   1. Chronic pain syndrome    2. Fibromyalgia    3. DDD (degenerative disc disease), lumbar    4. Brachial neuritis or radiculitis    5. Failed neck syndrome    6. Lumbar spondylosis    7. Lumbar radiculitis    8.  Postlaminectomy syndrome, lumbar region        PLAN:  Informed verbal consent was obtained  -Continue with current opioid regimen   -Continue with Percocet 3-4 per day  -He was advised to increase fluids ( 5-7  glasses of fluid daily), limit caffeine, avoid cheese products, increase dietary fiber, increase activity and exercise as tolerated and relax regularly and enjoy meals   -Walking as tolerated   -Back exercises given     Current Outpatient Medications   Medication Sig Dispense Refill    zolpidem (AMBIEN) 10 MG tablet TAKE ONE TABLET BY MOUTH ONCE NIGHTLY AS NEEDED FOR SLEEP 30 tablet 0    diclofenac (VOLTAREN) 75 MG EC tablet TAKE ONE TABLET BY MOUTH TWICE A DAY 60 tablet 1    oxyCODONE-acetaminophen (PERCOCET) 7.5-325 MG per tablet Take 1 tablet by mouth every 6 hours as needed for Pain (max 4 per day) for up to 30 days. 120 tablet 0    Naloxone HCl (EVZIO) 2 MG/0.4ML SOAJ Use as directed 1 Package 0    ALPRAZolam (XANAX) 0.5 MG tablet Take 0.5 mg by mouth nightly as needed. No current facility-administered medications for this visit. I will continue his current medication regimen  which is part of the above treatment schedule. It has been helping with Mr. Gilma Schmid chronic  medical problems which for this visit include:   Diagnoses of Chronic pain syndrome, Fibromyalgia, DDD (degenerative disc disease), lumbar, Brachial neuritis or radiculitis, Failed neck syndrome, Lumbar spondylosis, Lumbar radiculitis, and Postlaminectomy syndrome, lumbar region were pertinent to this visit. Risks and benefits of the medications and other alternative treatments  including no treatment were discussed with the patient. The common side effects of these medications were also explained to the patient. Informed verbal consent was obtained. Goals of current treatment regimen include improvement in pain, restoration of functioning- with focus on improvement in physical performance, general activity, work or disability,emotional distress, health care utilization and  decreased medication consumption. Will continue to monitor progress towards achieving/maintaining therapeutic goals with special emphasis on  1. Improvement in perceived interfernce  of pain with ADL's. Ability to do home exercises independently. Ability to do household chores indoor and/or outdoor work and social and leisure activities. Improve psychosocial and physical functioning. - he is showing progression towards this treatment goal with the current regimen.      He was advised against drinking alcohol with the narcotic pain medicines, advised against driving or handling machinery while adjusting the dose of medicines or if having cognitive  issues related to the current medications. Risk of overdose and death, if medicines not taken as prescribed, were also discussed. If the patient develops new symptoms or if the symptoms worsen, the patient should call the office. While transcribing every attempt was made to maintain the accuracy of the note in terms of it's contents,there may have been some errors made inadvertently. Thank you for allowing me to participate in the care of this patient.     Nilay Balderas MD.    Cc: Jennifer Young MD

## 2021-08-09 ENCOUNTER — TELEPHONE (OUTPATIENT)
Dept: PAIN MANAGEMENT | Age: 61
End: 2021-08-09

## 2021-08-09 NOTE — TELEPHONE ENCOUNTER
Submitted PA for OXYCODONE  Via CM Key: [id number] STATUS: \"Approved, Coverage Starts on: 8/9/2021 12:00:00 AM, Coverage Ends on: 8/9/2022\"    The patient was notified by .

## 2021-09-03 ENCOUNTER — OFFICE VISIT (OUTPATIENT)
Dept: PAIN MANAGEMENT | Age: 61
End: 2021-09-03
Payer: COMMERCIAL

## 2021-09-03 VITALS
TEMPERATURE: 98.1 F | WEIGHT: 215.2 LBS | OXYGEN SATURATION: 96 % | DIASTOLIC BLOOD PRESSURE: 95 MMHG | BODY MASS INDEX: 27.63 KG/M2 | HEART RATE: 75 BPM | SYSTOLIC BLOOD PRESSURE: 148 MMHG

## 2021-09-03 DIAGNOSIS — M54.16 LUMBAR RADICULITIS: ICD-10-CM

## 2021-09-03 DIAGNOSIS — M96.1 POSTLAMINECTOMY SYNDROME, LUMBAR REGION: ICD-10-CM

## 2021-09-03 DIAGNOSIS — M51.36 DDD (DEGENERATIVE DISC DISEASE), LUMBAR: ICD-10-CM

## 2021-09-03 DIAGNOSIS — G89.4 CHRONIC PAIN SYNDROME: ICD-10-CM

## 2021-09-03 DIAGNOSIS — M96.1 FAILED NECK SYNDROME: ICD-10-CM

## 2021-09-03 DIAGNOSIS — M47.816 LUMBAR SPONDYLOSIS: ICD-10-CM

## 2021-09-03 DIAGNOSIS — M79.7 FIBROMYALGIA: ICD-10-CM

## 2021-09-03 DIAGNOSIS — M54.12 BRACHIAL NEURITIS OR RADICULITIS: ICD-10-CM

## 2021-09-03 PROCEDURE — 99213 OFFICE O/P EST LOW 20 MIN: CPT | Performed by: INTERNAL MEDICINE

## 2021-09-03 RX ORDER — ZOLPIDEM TARTRATE 10 MG/1
10 TABLET ORAL NIGHTLY PRN
Qty: 30 TABLET | Refills: 0 | Status: SHIPPED | OUTPATIENT
Start: 2021-09-03 | End: 2021-10-04 | Stop reason: SDUPTHER

## 2021-09-03 RX ORDER — OXYCODONE AND ACETAMINOPHEN 7.5; 325 MG/1; MG/1
1 TABLET ORAL EVERY 6 HOURS PRN
Qty: 120 TABLET | Refills: 0 | Status: SHIPPED | OUTPATIENT
Start: 2021-09-03 | End: 2021-10-04 | Stop reason: SDUPTHER

## 2021-09-03 NOTE — PROGRESS NOTES
Susanne Timmons  1960  <[id number]>      HISTORY OF PRESENT ILLNESS:  Mr. Milena Ruiz is a 62 y.o. male returns for a follow up visit for pain management  He has a diagnosis of   1. Chronic pain syndrome    2. Fibromyalgia    3. DDD (degenerative disc disease), lumbar    4. Brachial neuritis or radiculitis    5. Failed neck syndrome    6. Lumbar spondylosis    7. Lumbar radiculitis    8. Postlaminectomy syndrome, lumbar region    . He complains of pain in the neck, upper back, lower back, right knee with radiation to the right foot He rates the pain 4/10 and describes it as sharp, aching, pins and needles. Current treatment regimen has helped relieve about 60% of the pain. He denies any side effects from the current pain regimen. Patient reports that since the last follow up visit the physical functioning is unchanged, family/social relationships are unchanged, mood is unchanged sleep patterns are unchanged, and that the overall functioning is unchanged. Patient denies misusing/abusing his narcotic pain medications or using any illegal drugs. There are No indicators for possible drug abuse, addiction or diversion problems. Patient states he has been doing fair, his pain has been baseline, somewhat tolerable. Mr. Milena Ruiz mentions he is using Percocet 4 per day. Patient is complaining of his back hurting worse than a neck. He mentions he is using all the other adjuvants including Voltaren and Ambien. ALLERGIES: Patients list of allergies were reviewed     MEDICATIONS: Mr. Milena Ruiz list of medications were reviewed. His current medications are   Outpatient Medications Prior to Visit   Medication Sig Dispense Refill    zolpidem (AMBIEN) 10 MG tablet Take 1 tablet by mouth nightly as needed for Sleep for up to 30 days. 30 tablet 0    oxyCODONE-acetaminophen (PERCOCET) 7.5-325 MG per tablet Take 1 tablet by mouth every 6 hours as needed for Pain (max 4 per day) for up to 30 days.  120 tablet 0    diclofenac (VOLTAREN) 75 MG EC tablet Take 1 tablet by mouth 2 times daily 60 tablet 1    Naloxone HCl (EVZIO) 2 MG/0.4ML SOAJ Use as directed 1 Package 0    ALPRAZolam (XANAX) 0.5 MG tablet Take 0.5 mg by mouth nightly as needed. No facility-administered medications prior to visit. REVIEW OF SYSTEMS:    Respiratory: Negative for apnea, chest tightness and shortness of breath or change in baseline breathing. PHYSICAL EXAM:   Nursing note and vitals reviewed. BP (!) 148/95   Pulse 75   Temp 98.1 °F (36.7 °C) (Temporal)   Wt 215 lb 3.2 oz (97.6 kg)   SpO2 96%   BMI 27.63 kg/m²   Constitutional: He appears well-developed and well-nourished. No acute distress. Cardiovascular: Normal rate, regular rhythm, normal heart sounds, and does not have murmur. Pulmonary/Chest: Effort normal. No respiratory distress. He does not have wheezes in the lung fields. He has no rales. Neurological/Psychiatric:He is alert and oriented to person, place, and time. Coordination is  normal.  His mood isAppropriate and affect is Neutral/Euthymic(normal) . His    IMPRESSION:   1. Chronic pain syndrome    2. Fibromyalgia    3. DDD (degenerative disc disease), lumbar    4. Brachial neuritis or radiculitis    5. Failed neck syndrome    6. Lumbar spondylosis    7. Lumbar radiculitis    8.  Postlaminectomy syndrome, lumbar region        PLAN:  Informed verbal consent was obtained  -Continue with current opioid regimen   -Continue with Percocet 4 per day along with Voltaren   -he was advised proper sleep hygiene-told to avoid:use of caffeine or other stimulants after noon, alcohol use near bedtime, long or frequent naps during the day, erratic sleep schedule, heavy meals near bedtime, vigorous exercise near bedtime and use of electronic devices near bedtime   -Continue with Ambien   -He was advised to increase fluids ( 5-7  glasses of fluid daily), limit caffeine, avoid cheese products, increase dietary fiber, increase activity and exercise as tolerated and relax regularly and enjoy meals   -Walking/Stretching exercises as advised      Current Outpatient Medications   Medication Sig Dispense Refill    zolpidem (AMBIEN) 10 MG tablet Take 1 tablet by mouth nightly as needed for Sleep for up to 30 days. 30 tablet 0    oxyCODONE-acetaminophen (PERCOCET) 7.5-325 MG per tablet Take 1 tablet by mouth every 6 hours as needed for Pain (max 4 per day) for up to 30 days. 120 tablet 0    diclofenac (VOLTAREN) 75 MG EC tablet Take 1 tablet by mouth 2 times daily 60 tablet 1    Naloxone HCl (EVZIO) 2 MG/0.4ML SOAJ Use as directed 1 Package 0    ALPRAZolam (XANAX) 0.5 MG tablet Take 0.5 mg by mouth nightly as needed. No current facility-administered medications for this visit. I will continue his current medication regimen  which is part of the above treatment schedule. It has been helping with Mr. Anish Jimenez chronic  medical problems which for this visit include:   Diagnoses of Chronic pain syndrome, Fibromyalgia, DDD (degenerative disc disease), lumbar, Brachial neuritis or radiculitis, Failed neck syndrome, Lumbar spondylosis, Lumbar radiculitis, and Postlaminectomy syndrome, lumbar region were pertinent to this visit. Risks and benefits of the medications and other alternative treatments  including no treatment were discussed with the patient. The common side effects of these medications were also explained to the patient. Informed verbal consent was obtained. Goals of current treatment regimen include improvement in pain, restoration of functioning- with focus on improvement in physical performance, general activity, work or disability,emotional distress, health care utilization and  decreased medication consumption. Will continue to monitor progress towards achieving/maintaining therapeutic goals with special emphasis on  1. Improvement in perceived interfernce  of pain with ADL's.  Ability to do home exercises independently. Ability to do household chores indoor and/or outdoor work and social and leisure activities. Improve psychosocial and physical functioning. - he is showing progression towards this treatment goal with the current regimen. He was advised against drinking alcohol with the narcotic pain medicines, advised against driving or handling machinery while adjusting the dose of medicines or if having cognitive  issues related to the current medications. Risk of overdose and death, if medicines not taken as prescribed, were also discussed. If the patient develops new symptoms or if the symptoms worsen, the patient should call the office. While transcribing every attempt was made to maintain the accuracy of the note in terms of it's contents,there may have been some errors made inadvertently. Thank you for allowing me to participate in the care of this patient.     Tayla Clemons MD.    Cc: Kathy Siddiqi MD

## 2021-10-04 ENCOUNTER — OFFICE VISIT (OUTPATIENT)
Dept: PAIN MANAGEMENT | Age: 61
End: 2021-10-04
Payer: COMMERCIAL

## 2021-10-04 VITALS
BODY MASS INDEX: 27.6 KG/M2 | WEIGHT: 215 LBS | SYSTOLIC BLOOD PRESSURE: 160 MMHG | OXYGEN SATURATION: 96 % | TEMPERATURE: 98.1 F | DIASTOLIC BLOOD PRESSURE: 100 MMHG | HEART RATE: 102 BPM

## 2021-10-04 DIAGNOSIS — M54.12 BRACHIAL NEURITIS OR RADICULITIS: ICD-10-CM

## 2021-10-04 DIAGNOSIS — M96.1 POSTLAMINECTOMY SYNDROME, LUMBAR REGION: ICD-10-CM

## 2021-10-04 DIAGNOSIS — M96.1 FAILED NECK SYNDROME: ICD-10-CM

## 2021-10-04 DIAGNOSIS — M54.16 LUMBAR RADICULITIS: ICD-10-CM

## 2021-10-04 DIAGNOSIS — M79.7 FIBROMYALGIA: ICD-10-CM

## 2021-10-04 DIAGNOSIS — G89.4 CHRONIC PAIN SYNDROME: ICD-10-CM

## 2021-10-04 DIAGNOSIS — M51.36 DDD (DEGENERATIVE DISC DISEASE), LUMBAR: ICD-10-CM

## 2021-10-04 DIAGNOSIS — M47.816 LUMBAR SPONDYLOSIS: ICD-10-CM

## 2021-10-04 PROCEDURE — 99213 OFFICE O/P EST LOW 20 MIN: CPT | Performed by: INTERNAL MEDICINE

## 2021-10-04 RX ORDER — OXYCODONE AND ACETAMINOPHEN 7.5; 325 MG/1; MG/1
1 TABLET ORAL EVERY 6 HOURS PRN
Qty: 120 TABLET | Refills: 0 | Status: SHIPPED | OUTPATIENT
Start: 2021-10-04 | End: 2021-11-04 | Stop reason: SDUPTHER

## 2021-10-04 RX ORDER — ZOLPIDEM TARTRATE 10 MG/1
10 TABLET ORAL NIGHTLY PRN
Qty: 30 TABLET | Refills: 0 | Status: SHIPPED | OUTPATIENT
Start: 2021-10-04 | End: 2021-11-04 | Stop reason: SDUPTHER

## 2021-10-04 NOTE — PROGRESS NOTES
Paula Gannon  1960  <[id number]>      HISTORY OF PRESENT ILLNESS:  Mr. Maribel Al is a 62 y.o. male returns for a follow up visit for pain management  He has a diagnosis of   1. Chronic pain syndrome    2. DDD (degenerative disc disease), lumbar    3. Fibromyalgia    4. Brachial neuritis or radiculitis    5. Lumbar spondylosis    6. Failed neck syndrome    7. Lumbar radiculitis    8. Postlaminectomy syndrome, lumbar region    . He complains of pain in the neck, upper back, lower back, left hand, right lower leg, right foot He rates the pain 5/10 and describes it as sharp, aching, pins and needles. Current treatment regimen has helped relieve about 50% of the pain. He denies any side effects from the current pain regimen. Patient reports that since the last follow up visit the physical functioning is unchanged, family/social relationships are unchanged, mood is unchanged sleep patterns are unchanged, and that the overall functioning is unchanged. Patient denies misusing/abusing his narcotic pain medications or using any illegal drugs. There are No indicators for possible drug abuse, addiction or diversion problems. Patient reports he has been doing about the same. He mentions he is working full time still. He denies any constipation symptoms. He says he is using Percocet 4 per day along with Voltaren and Ambien. He denies any side effects. He states he is doing some back stretching exercises     ALLERGIES: Patients list of allergies were reviewed     MEDICATIONS: Mr. Marbiel Al list of medications were reviewed. His current medications are   Outpatient Medications Prior to Visit   Medication Sig Dispense Refill    diclofenac (VOLTAREN) 75 MG EC tablet Take 1 tablet by mouth 2 times daily 60 tablet 1    Naloxone HCl (EVZIO) 2 MG/0.4ML SOAJ Use as directed 1 Package 0    ALPRAZolam (XANAX) 0.5 MG tablet Take 0.5 mg by mouth nightly as needed. No facility-administered medications prior to visit. REVIEW OF SYSTEMS:    Respiratory: Negative for apnea, chest tightness and shortness of breath or change in baseline breathing. PHYSICAL EXAM:   Nursing note and vitals reviewed. BP (!) 173/113   Pulse 102   Temp 98.1 °F (36.7 °C) (Temporal)   Wt 215 lb (97.5 kg)   SpO2 96%   BMI 27.60 kg/m²   Constitutional: He appears well-developed and well-nourished. No acute distress. Cardiovascular: Normal rate, regular rhythm, normal heart sounds, and does not have murmur. Pulmonary/Chest: Effort normal. No respiratory distress. He does not have wheezes in the lung fields. He has no rales. Neurological/Psychiatric:He is alert and oriented to person, place, and time. Coordination is  normal.  His mood isAppropriate and affect is Neutral/Euthymic(normal) . His    IMPRESSION:   1. Chronic pain syndrome    2. DDD (degenerative disc disease), lumbar    3. Fibromyalgia    4. Brachial neuritis or radiculitis    5. Lumbar spondylosis    6. Failed neck syndrome    7. Lumbar radiculitis    8. Postlaminectomy syndrome, lumbar region        PLAN:  Informed verbal consent was obtained  -ROM/Stretching exercises as advised   -He was advised to increase fluids ( 5-7  glasses of fluid daily), limit caffeine, avoid cheese products, increase dietary fiber, increase activity and exercise as tolerated and relax regularly and enjoy meals   -Walking 20 minutes daily as tolerated   -Danielle exercises given   -Continue with Percocet 4 per day     Current Outpatient Medications   Medication Sig Dispense Refill    diclofenac (VOLTAREN) 75 MG EC tablet Take 1 tablet by mouth 2 times daily 60 tablet 1    Naloxone HCl (EVZIO) 2 MG/0.4ML SOAJ Use as directed 1 Package 0    ALPRAZolam (XANAX) 0.5 MG tablet Take 0.5 mg by mouth nightly as needed. No current facility-administered medications for this visit. I will continue his current medication regimen  which is part of the above treatment schedule.  It has been helping with Mr. Steffany Dempsey chronic  medical problems which for this visit include:   Diagnoses of Chronic pain syndrome, DDD (degenerative disc disease), lumbar, Fibromyalgia, Brachial neuritis or radiculitis, Lumbar spondylosis, Failed neck syndrome, Lumbar radiculitis, and Postlaminectomy syndrome, lumbar region were pertinent to this visit. Risks and benefits of the medications and other alternative treatments  including no treatment were discussed with the patient. The common side effects of these medications were also explained to the patient. Informed verbal consent was obtained. Goals of current treatment regimen include improvement in pain, restoration of functioning- with focus on improvement in physical performance, general activity, work or disability,emotional distress, health care utilization and  decreased medication consumption. Will continue to monitor progress towards achieving/maintaining therapeutic goals with special emphasis on  1. Improvement in perceived interfernce  of pain with ADL's. Ability to do home exercises independently. Ability to do household chores indoor and/or outdoor work and social and leisure activities. Improve psychosocial and physical functioning. - he is showing progression towards this treatment goal with the current regimen. He was advised against drinking alcohol with the narcotic pain medicines, advised against driving or handling machinery while adjusting the dose of medicines or if having cognitive  issues related to the current medications. Risk of overdose and death, if medicines not taken as prescribed, were also discussed. If the patient develops new symptoms or if the symptoms worsen, the patient should call the office. While transcribing every attempt was made to maintain the accuracy of the note in terms of it's contents,there may have been some errors made inadvertently.   Thank you for allowing me to participate in the care of this patient.     Erasmo Oviedo MD.    Cc: Araceli Aguilar MD

## 2021-11-04 ENCOUNTER — OFFICE VISIT (OUTPATIENT)
Dept: PAIN MANAGEMENT | Age: 61
End: 2021-11-04
Payer: COMMERCIAL

## 2021-11-04 VITALS — BODY MASS INDEX: 27.99 KG/M2 | WEIGHT: 218 LBS

## 2021-11-04 DIAGNOSIS — M51.36 DDD (DEGENERATIVE DISC DISEASE), LUMBAR: ICD-10-CM

## 2021-11-04 DIAGNOSIS — M54.16 LUMBAR RADICULITIS: ICD-10-CM

## 2021-11-04 DIAGNOSIS — G89.4 CHRONIC PAIN SYNDROME: ICD-10-CM

## 2021-11-04 DIAGNOSIS — M54.12 BRACHIAL NEURITIS OR RADICULITIS: ICD-10-CM

## 2021-11-04 DIAGNOSIS — F51.01 PRIMARY INSOMNIA: ICD-10-CM

## 2021-11-04 DIAGNOSIS — M96.1 POSTLAMINECTOMY SYNDROME, LUMBAR REGION: ICD-10-CM

## 2021-11-04 DIAGNOSIS — M79.7 FIBROMYALGIA: ICD-10-CM

## 2021-11-04 DIAGNOSIS — M96.1 FAILED NECK SYNDROME: ICD-10-CM

## 2021-11-04 DIAGNOSIS — M47.816 LUMBAR SPONDYLOSIS: ICD-10-CM

## 2021-11-04 DIAGNOSIS — F39 MOOD DISORDER (HCC): ICD-10-CM

## 2021-11-04 PROCEDURE — 99214 OFFICE O/P EST MOD 30 MIN: CPT | Performed by: INTERNAL MEDICINE

## 2021-11-04 RX ORDER — OXYCODONE AND ACETAMINOPHEN 7.5; 325 MG/1; MG/1
1 TABLET ORAL EVERY 6 HOURS PRN
Qty: 120 TABLET | Refills: 0 | Status: SHIPPED | OUTPATIENT
Start: 2021-11-04 | End: 2021-12-01 | Stop reason: SDUPTHER

## 2021-11-04 RX ORDER — ZOLPIDEM TARTRATE 10 MG/1
10 TABLET ORAL NIGHTLY PRN
Qty: 30 TABLET | Refills: 0 | Status: SHIPPED | OUTPATIENT
Start: 2021-11-04 | End: 2021-12-01 | Stop reason: SDUPTHER

## 2021-11-04 RX ORDER — METHYLPREDNISOLONE 4 MG/1
TABLET ORAL
Qty: 1 KIT | Refills: 0 | Status: SHIPPED | OUTPATIENT
Start: 2021-11-04 | End: 2021-12-01

## 2021-11-04 NOTE — PROGRESS NOTES
Venkat Stauffer  1960  <[id number]>    HISTORY OF PRESENT ILLNESS:  Mr. Tyshawn Figueroa is a 62 y.o. male returns for a follow up visit for multiple medical problems. His current presenting problems are   1. Chronic pain syndrome    2. DDD (degenerative disc disease), lumbar    3. Fibromyalgia    4. Brachial neuritis or radiculitis    5. Lumbar spondylosis    6. Failed neck syndrome    7. Lumbar radiculitis    8. Postlaminectomy syndrome, lumbar region    9. Mood disorder ([address]    10. Primary insomnia    . As per information/history obtained from the PADT(patient assessment and documentation tool) - He complains of pain in the neck, upper back, mid back and lower back with radiation to the buttocks, hips Right, upper leg Right, knees Right, lower leg Right, ankles Right and feet Right He rates the pain 7/10 and describes it as sharp, aching, burning. Pain is made worse by: nothing, movement, walking, standing, sitting, bending, lifting. Current treatment regimen has helped relieve about 30% of the pain. He denies side effects from the current pain regimen. Patient reports that since the last follow up visit the physical functioning is unchanged, family/social relationships are unchanged, mood is unchanged and sleep patterns are unchanged, and that the overall functioning is worse. Patient denies neurological bowel or bladder. Patient denies misusing/abusing his narcotic pain medications or using any illegal drugs. There are No indicators for possible drug abuse, addiction or diversion problems. Upon obtaining the medical history from Mr. Tyshawn Figueroa regarding today's office visit for his presenting problems, patient complains he is having increase pain in the back and its getting harder to move or do things. He says he is using Percocet 4 per day along with Voltaren. He denies any side effects. He states his legs are feeling weak and arms/shoulders feel tired and fatigued.  Patient's  subjective report of his mood is fair. he describes occasional symptoms of depression, occasional  irritability and some mood swings. Describes his mood as being neutral and reports some pleasure in his daily activities. Reports  fair  appetite, energy and concentration. Able to function well in different aspects of his daily activities. Denies suicidal or homicidal ideation. Denies any complaints of increased tension, does   Worry sometimes and occasional  irritability  he denies any c/o increased anxiety, No c/o panic attacks or symptoms of PTSD. He mentions he is feeling down at times. Patient states his sleep is fair. Has fairly normal sleep latency. Averages about 4-6 hours of sleep a night. Denies any signs of sleep apnea. Feels somewhat rested in the morning. He says he is using Ambien. ALLERGIES/PAST MED/FAM/SOC HISTORY: Mr. Bessie Canavan allergies, past medical, family and social history were reviewed in the chart. Mr. Bessie Canavan current medications are   Outpatient Medications Prior to Visit   Medication Sig Dispense Refill    diclofenac (VOLTAREN) 75 MG EC tablet Take 1 tablet by mouth 2 times daily 60 tablet 1    Naloxone HCl (EVZIO) 2 MG/0.4ML SOAJ Use as directed 1 Package 0    ALPRAZolam (XANAX) 0.5 MG tablet Take 0.5 mg by mouth nightly as needed. No facility-administered medications prior to visit. REVIEW OF SYSTEMS:     Respiratory: Negative for shortness of breath. Cardiovascular: Negative for chest pain, palpitations  Gastrointestinal: Negative for blood in stool, abdominal distention, nausea, vomiting, abdominal pain, diarrhea,constipation. Neurological: Negative for speech difficulty, weakness and light-headedness, dizziness, tremors, sleepiness  Psychiatric/Behavioral: Negative for suicidal ideas, hallucinations, behavioral problems, self-injury, decreased concentration/cognition, agitation, confusion. PHYSICAL EXAM:   Nursing note and vitals reviewed.  Wt 218 lb (98.9 kg)   BMI 27.99 kg/m²   General Appearance: Patient is well nourished, well developed, well groomed and in no acute distress. Skin: Skin is warm and dry, good turgor . No rash or lesions noted. He is not diaphoretic. Pulmonary/Chest: Effort normal. No respiratory distress or use of accessory muscles. Auscultation revealing normal air entry. He does not have wheezes in the lung fields. He has no rales. Cardiovascular: Normal rate, regular rhythm, normal heart sounds, and does not have murmur. Exam reveals no gallop and no friction rub. Musculoskeletal / Extremities: Range of motion is normal. Gait is normal, assistive devices use: none. He exhibits edema: none, and no tenderness. Neurological/Psychiatric:He is alert and oriented to person, place, and time. Coordination is  normal.   Judgement and Insight is normal  His mood is Appropriate and affect is Neutral/Euthymic(normal) . His behavior is normal.   thought content normal.        IMPRESSION:     1. Chronic pain syndrome    2. DDD (degenerative disc disease), lumbar    3. Fibromyalgia    4. Brachial neuritis or radiculitis    5. Lumbar spondylosis    6. Failed neck syndrome    7. Lumbar radiculitis    8. Postlaminectomy syndrome, lumbar region    9. Mood disorder ([facility] [address]    10. Primary insomnia        PLAN:  Informed verbal consent was obtained. -[place] record was obtained and reviewed  for the last one year and no indicators of drug misuse  were found. Any other controlled substance prescriptions  seen on the record have been accounted for, I am aware of the patient receiving these medications. Miesha Blair [place] record will be rechecked as part of office protocol.     -ROM/Stretching exercises as advised   -Continue with Voltaren 75 mg daily   -continue with Percocet 4 per day   -Start Cymbalta 30 mgs daily   -CBT techniques- relaxation therapies such as biofeedback, mindfulness based stress reduction, imagery, cognitive restructuring, problem solving discussed with patient -Start Medrol dose pack to help with back and leg pain   -Danielle exercises given   Mr. Makenzie Sylvester will be prescribed  the medications  listed below which are for treatment of his presenting  medical problems which for this visit include:   Diagnoses of Chronic pain syndrome, DDD (degenerative disc disease), lumbar, Fibromyalgia, Brachial neuritis or radiculitis, Lumbar spondylosis, Failed neck syndrome, Lumbar radiculitis, Postlaminectomy syndrome, lumbar region, Mood disorder ([address] and Primary insomnia were pertinent to this visit. Medications/orders associated with this visit:    Current Outpatient Medications   Medication Sig Dispense Refill    diclofenac (VOLTAREN) 75 MG EC tablet Take 1 tablet by mouth 2 times daily 60 tablet 1    Naloxone HCl (EVZIO) 2 MG/0.4ML SOAJ Use as directed 1 Package 0    ALPRAZolam (XANAX) 0.5 MG tablet Take 0.5 mg by mouth nightly as needed. No current facility-administered medications for this visit. Goals of current treatment regimen include improvement in pain, restoration of functioning- with focus on improvement in physical performance, general activity, work or disability,emotional distress, health care utilization and  decreased medication consumption. Will continue to monitor progress towards achieving/maintaining therapeutic goals with special emphasis on  1. Improvement in perceived interfernce  of pain with ADL's. Ability to do home exercises independently. Ability to do household chores indoor and/or outdoor work and social and leisure activities. To increase flexibility/ROM, strength and endurance. Improve psychosocial and physical functioning.- he is not showing any significant progress/or showing regression  towards this goal and reassessment and adjustment of goals/treatment have been made. 2. Improving sleep to 6-7 hours a night.  Improve mood/ anxiety and depression symptoms such as crying spells, low energy, problems with concentration, motivation.- he is showing progression towards this treatment goal with the current regimen. 3. Reduction of reliance on opioid analgesia/more appropriate opioid use. - he is showing progression towards this treatment goal with the current regimen. 4. Ability to focus/concentrate at work and perform the duties required of him at work  Sit through a workday without lower extremity symptoms. Stand 30-60 minutes without lower extremity symptoms. Ability to lift up to 10-20 lbs. Ability to go up and down stairs. Sit 30-60 minutes  Without having to stand up frequently. - he is maintaining/progressing towards his work related goals with the current regimen. Risks and benefits of the medications and other alternative treatments have been/were  discussed with the patient. Any questions on the  common side effects of these medications were also answered. He was advised against drinking alcohol with the narcotic pain medicines, advised against driving or handling machinery when  starting or adjusting the dose of medicines, feeling groggy or drowsy, or if having any cognitive issues related to the current medications. Heis fully aware of the risk of overdose and death, if medicines are misused and not taken as prescribed. If he develops new symptoms or if the symptoms worsen, he was told to call the office. .  Thank you for allowing me to participate in the care of this patient.     Chelsie Mckeon MD.    Cc: Jessy Lunsford MD

## 2021-11-05 NOTE — TELEPHONE ENCOUNTER
Patient called stating [facility] wanted him to try CYMBALTA and the script would be called into  pharmacy.  As of this afternoon he has not received a script         Please send script             Pharmacy    [address] [place] [address] [place]  [place] [phone]

## 2021-11-08 RX ORDER — DULOXETIN HYDROCHLORIDE 30 MG/1
30 CAPSULE, DELAYED RELEASE ORAL DAILY
Qty: 30 CAPSULE | Refills: 0 | Status: SHIPPED | OUTPATIENT
Start: 2021-11-08 | End: 2022-01-31 | Stop reason: SDUPTHER

## 2021-12-01 ENCOUNTER — OFFICE VISIT (OUTPATIENT)
Dept: PAIN MANAGEMENT | Age: 61
End: 2021-12-01
Payer: COMMERCIAL

## 2021-12-01 VITALS
HEIGHT: 74 IN | BODY MASS INDEX: 27.59 KG/M2 | DIASTOLIC BLOOD PRESSURE: 99 MMHG | WEIGHT: 215 LBS | SYSTOLIC BLOOD PRESSURE: 158 MMHG | HEART RATE: 77 BPM | RESPIRATION RATE: 16 BRPM

## 2021-12-01 DIAGNOSIS — M96.1 FAILED NECK SYNDROME: ICD-10-CM

## 2021-12-01 DIAGNOSIS — M51.36 DDD (DEGENERATIVE DISC DISEASE), LUMBAR: ICD-10-CM

## 2021-12-01 DIAGNOSIS — G89.4 CHRONIC PAIN SYNDROME: ICD-10-CM

## 2021-12-01 DIAGNOSIS — M54.12 BRACHIAL NEURITIS OR RADICULITIS: ICD-10-CM

## 2021-12-01 DIAGNOSIS — M54.16 LUMBAR RADICULITIS: ICD-10-CM

## 2021-12-01 DIAGNOSIS — M79.7 FIBROMYALGIA: ICD-10-CM

## 2021-12-01 DIAGNOSIS — M47.816 LUMBAR SPONDYLOSIS: ICD-10-CM

## 2021-12-01 DIAGNOSIS — M96.1 POSTLAMINECTOMY SYNDROME, LUMBAR REGION: ICD-10-CM

## 2021-12-01 PROCEDURE — 99213 OFFICE O/P EST LOW 20 MIN: CPT | Performed by: INTERNAL MEDICINE

## 2021-12-01 RX ORDER — OXYCODONE AND ACETAMINOPHEN 7.5; 325 MG/1; MG/1
1 TABLET ORAL EVERY 6 HOURS PRN
Qty: 120 TABLET | Refills: 0 | Status: SHIPPED | OUTPATIENT
Start: 2021-12-01 | End: 2022-01-03 | Stop reason: SDUPTHER

## 2021-12-01 RX ORDER — DICLOFENAC SODIUM 75 MG/1
75 TABLET, DELAYED RELEASE ORAL 2 TIMES DAILY
Qty: 60 TABLET | Refills: 1 | Status: SHIPPED | OUTPATIENT
Start: 2021-12-01 | End: 2022-01-31 | Stop reason: SDUPTHER

## 2021-12-01 RX ORDER — ZOLPIDEM TARTRATE 10 MG/1
10 TABLET ORAL NIGHTLY PRN
Qty: 30 TABLET | Refills: 0 | Status: SHIPPED | OUTPATIENT
Start: 2021-12-01 | End: 2022-01-03 | Stop reason: SDUPTHER

## 2021-12-01 NOTE — PROGRESS NOTES
Alphonse Precise  1960  <[id number]>    HISTORY OF PRESENT ILLNESS:  Mr. Danielle Clay is a 62 y.o. male returns for a follow up visit for multiple medical problems. His current presenting problems are   1. Chronic pain syndrome    2. DDD (degenerative disc disease), lumbar    3. Fibromyalgia    4. Lumbar spondylosis    5. Failed neck syndrome    6. Postlaminectomy syndrome, lumbar region    7. Brachial neuritis or radiculitis    8. Lumbar radiculitis    . As per information/history obtained from the PADT(patient assessment and documentation tool) - He complains of pain in the neck, upper back, mid back and lower back with radiation to the shoulders Bilateral, lower back, buttocks, hips Left, upper leg Left, knees Left, lower leg Left, ankles Left and feet Left He rates the pain 6/10 and describes it as sharp, aching, burning. Pain is made worse by: movement, walking, standing, sitting, bending, lifting. Current treatment regimen has helped relieve about 40% of the pain. He denies side effects from the current pain regimen. Patient reports that since the last follow up visit the physical functioning is unchanged, family/social relationships are unchanged, mood is unchanged and sleep patterns are unchanged, and that the overall functioning is unchanged. Patient denies neurological bowel or bladder. Patient denies misusing/abusing his narcotic pain medications or using any illegal drugs. here are No indicators for possible drug abuse, addiction or diversion problems. Upon obtaining the medical history from Mr. Danielle Clay regarding today's office visit for his presenting problems, patient states he has been doing about the same, a lot of pain. He mentions he is using Percocet 3-4 per day along with other adjuvants. He says he has been walking and doing stretches. He denies any constipation symptoms.        ALLERGIES: Patients list of allergies were reviewed     MEDICATIONS: Mr. Danielle Clay list of medications were reviewed. His current medications are   Outpatient Medications Prior to Visit   Medication Sig Dispense Refill    zolpidem (AMBIEN) 10 MG tablet Take 1 tablet by mouth nightly as needed for Sleep for up to 30 days. 30 tablet 0    oxyCODONE-acetaminophen (PERCOCET) 7.5-325 MG per tablet Take 1 tablet by mouth every 6 hours as needed for Pain (max 4 per day) for up to 30 days. 120 tablet 0    methylPREDNISolone (MEDROL, ARYA,) 4 MG tablet Use as directed 1 kit 0    diclofenac (VOLTAREN) 75 MG EC tablet Take 1 tablet by mouth 2 times daily 60 tablet 1    Naloxone HCl (EVZIO) 2 MG/0.4ML SOAJ Use as directed 1 Package 0    ALPRAZolam (XANAX) 0.5 MG tablet Take 0.5 mg by mouth nightly as needed.  DULoxetine (CYMBALTA) 30 MG extended release capsule Take 1 capsule by mouth daily (Patient not taking: Reported on 12/1/2021) 30 capsule 0     No facility-administered medications prior to visit. REVIEW OF SYSTEMS:    Respiratory: Negative for apnea, chest tightness and shortness of breath or change in baseline breathing. PHYSICAL EXAM:   Nursing note and vitals reviewed. BP (!) 158/99   Pulse 77   Resp 16   Ht 6' 2\" (1.88 m)   Wt 215 lb (97.5 kg)   BMI 27.60 kg/m²   Constitutional: He appears well-developed and well-nourished. No acute distress. Cardiovascular: Normal rate, regular rhythm, normal heart sounds, and does not have murmur. Pulmonary/Chest: Effort normal. No respiratory distress. He does not have wheezes in the lung fields. He has no rales. Neurological/Psychiatric:He is alert and oriented to person, place, and time. Coordination is  normal.  His mood isAppropriate and affect is Neutral/Euthymic(normal) . IMPRESSION:   1. Chronic pain syndrome    2. DDD (degenerative disc disease), lumbar    3. Fibromyalgia    4. Lumbar spondylosis    5. Failed neck syndrome    6. Postlaminectomy syndrome, lumbar region    7. Brachial neuritis or radiculitis    8.  Lumbar radiculitis treatment regimen include improvement in pain, restoration of functioning- with focus on improvement in physical performance, general activity, work or disability,emotional distress, health care utilization and  decreased medication consumption. Will continue to monitor progress towards achieving/maintaining therapeutic goals with special emphasis on  1. Improvement in perceived interfernce  of pain with ADL's. Ability to do home exercises independently. Ability to do household chores indoor and/or outdoor work and social and leisure activities. Improve psychosocial and physical functioning. - he is showing progression towards this treatment goal with the current regimen. He was advised against drinking alcohol with the narcotic pain medicines, advised against driving or handling machinery while adjusting the dose of medicines or if having cognitive  issues related to the current medications. Risk of overdose and death, if medicines not taken as prescribed, were also discussed. If the patient develops new symptoms or if the symptoms worsen, the patient should call the office. While transcribing every attempt was made to maintain the accuracy of the note in terms of it's contents,there may have been some errors made inadvertently. Thank you for allowing me to participate in the care of this patient.     Basilia Obrien MD.    Cc: Rosa Arthur MD

## 2022-01-03 ENCOUNTER — OFFICE VISIT (OUTPATIENT)
Dept: PAIN MANAGEMENT | Age: 62
End: 2022-01-03
Payer: COMMERCIAL

## 2022-01-03 VITALS
OXYGEN SATURATION: 95 % | HEART RATE: 94 BPM | RESPIRATION RATE: 16 BRPM | BODY MASS INDEX: 27.59 KG/M2 | SYSTOLIC BLOOD PRESSURE: 160 MMHG | WEIGHT: 215 LBS | DIASTOLIC BLOOD PRESSURE: 97 MMHG | HEIGHT: 74 IN

## 2022-01-03 DIAGNOSIS — M96.1 POSTLAMINECTOMY SYNDROME, LUMBAR REGION: ICD-10-CM

## 2022-01-03 DIAGNOSIS — M79.7 FIBROMYALGIA: ICD-10-CM

## 2022-01-03 DIAGNOSIS — M47.816 LUMBAR SPONDYLOSIS: ICD-10-CM

## 2022-01-03 DIAGNOSIS — M54.16 LUMBAR RADICULITIS: ICD-10-CM

## 2022-01-03 DIAGNOSIS — M96.1 FAILED NECK SYNDROME: ICD-10-CM

## 2022-01-03 DIAGNOSIS — G89.4 CHRONIC PAIN SYNDROME: ICD-10-CM

## 2022-01-03 DIAGNOSIS — M54.12 BRACHIAL NEURITIS OR RADICULITIS: ICD-10-CM

## 2022-01-03 DIAGNOSIS — M51.36 DDD (DEGENERATIVE DISC DISEASE), LUMBAR: ICD-10-CM

## 2022-01-03 PROCEDURE — 99213 OFFICE O/P EST LOW 20 MIN: CPT | Performed by: INTERNAL MEDICINE

## 2022-01-03 RX ORDER — ZOLPIDEM TARTRATE 10 MG/1
10 TABLET ORAL NIGHTLY PRN
Qty: 30 TABLET | Refills: 0 | Status: SHIPPED | OUTPATIENT
Start: 2022-01-03 | End: 2022-01-31 | Stop reason: SDUPTHER

## 2022-01-03 RX ORDER — OXYCODONE AND ACETAMINOPHEN 7.5; 325 MG/1; MG/1
1 TABLET ORAL EVERY 6 HOURS PRN
Qty: 120 TABLET | Refills: 0 | Status: SHIPPED | OUTPATIENT
Start: 2022-01-03 | End: 2022-01-31 | Stop reason: SDUPTHER

## 2022-01-03 NOTE — PROGRESS NOTES
Renzo Betty  1960  <[id number]>    HISTORY OF PRESENT ILLNESS:  Mr. Vasiliy Brandon is a 62 y.o. male returns for a follow up visit for multiple medical problems. His current presenting problems are   1. Chronic pain syndrome    2. Fibromyalgia    3. Failed neck syndrome    4. Brachial neuritis or radiculitis    5. Mood disorder ([address]    6. DDD (degenerative disc disease), lumbar    7. Lumbar spondylosis    8. Postlaminectomy syndrome, lumbar region    9. Lumbar radiculitis    10. Primary insomnia    . As per information/history obtained from the PADT(patient assessment and documentation tool) - He complains of pain in the neck, hands Bilateral, lower back and lower leg Right with radiation to the lower leg Right He rates the pain 5/10 and describes it as sharp, pins and needles. Pain is made worse by: nothing, movement, walking, standing, sitting, bending, lifting. Current treatment regimen has helped relieve about 50% of the pain. He denies side effects from the current pain regimen. Patient reports that since the last follow up visit the physical functioning is unchanged, family/social relationships are unchanged, mood is unchanged and sleep patterns are unchanged, and that the overall functioning is unchanged. Patient denies neurological bowel or bladder. Patient denies misusing/abusing his narcotic pain medications or using any illegal drugs. There are No indicators for possible drug abuse, addiction or diversion problems. Upon obtaining the medical history from Mr. Vasiliy Brandon regarding today's office visit for his presenting problems, patient states he has been doing fair, pain has been manageable. He mentions he was sick for a few days, had tested positive for Covid. He says he is doing better. He reports he has been using Percocet 4 per day. He denies any constipation symptoms.  BP is high, he states he is not on any medications for it.        ALLERGIES: Patients list of allergies were reviewed     MEDICATIONS: Mr. Theresa Valdez list of medications were reviewed. His current medications are   Outpatient Medications Prior to Visit   Medication Sig Dispense Refill    diclofenac (VOLTAREN) 75 MG EC tablet Take 1 tablet by mouth 2 times daily 60 tablet 1    DULoxetine (CYMBALTA) 30 MG extended release capsule Take 1 capsule by mouth daily 30 capsule 0    Naloxone HCl (EVZIO) 2 MG/0.4ML SOAJ Use as directed 1 Package 0    ALPRAZolam (XANAX) 0.5 MG tablet Take 0.5 mg by mouth nightly as needed. No facility-administered medications prior to visit. REVIEW OF SYSTEMS:    Respiratory: Negative for apnea, chest tightness and shortness of breath or change in baseline breathing. PHYSICAL EXAM:   Nursing note and vitals reviewed. BP (!) 160/97   Pulse 94   Resp 16   Ht 6' 2\" (1.88 m)   Wt 215 lb (97.5 kg)   SpO2 95%   BMI 27.60 kg/m²   Constitutional: He appears well-developed and well-nourished. No acute distress. Cardiovascular: Normal rate, regular rhythm, normal heart sounds, and does not have murmur. Pulmonary/Chest: Effort normal. No respiratory distress. He does not have wheezes in the lung fields. He has no rales. Neurological/Psychiatric:He is alert and oriented to person, place, and time. Coordination is  normal.  His mood isAppropriate and affect is Neutral/Euthymic(normal) . IMPRESSION:   1. Chronic pain syndrome    2. Fibromyalgia    3. Failed neck syndrome    4. Brachial neuritis or radiculitis    5. DDD (degenerative disc disease), lumbar    6. Lumbar spondylosis    7. Postlaminectomy syndrome, lumbar region    8.  Lumbar radiculitis        PLAN:  Informed verbal consent was obtained  -ROM/Stretching exercises as advised   -He was advised to increase fluids ( 5-7  glasses of fluid daily), limit caffeine, avoid cheese products, increase dietary fiber, increase activity and exercise as tolerated and relax regularly and enjoy meals   -Walking as tolerated -Continue with Percocet 4 per day    Current Outpatient Medications   Medication Sig Dispense Refill    diclofenac (VOLTAREN) 75 MG EC tablet Take 1 tablet by mouth 2 times daily 60 tablet 1    DULoxetine (CYMBALTA) 30 MG extended release capsule Take 1 capsule by mouth daily 30 capsule 0    Naloxone HCl (EVZIO) 2 MG/0.4ML SOAJ Use as directed 1 Package 0    ALPRAZolam (XANAX) 0.5 MG tablet Take 0.5 mg by mouth nightly as needed. No current facility-administered medications for this visit. I will continue his current medication regimen  which is part of the above treatment schedule. It has been helping with Mr. Kulwinder Martinez chronic  medical problems which for this visit include:   Diagnoses of Chronic pain syndrome, Fibromyalgia, Failed neck syndrome, Brachial neuritis or radiculitis, Mood disorder (HCC), DDD (degenerative disc disease), lumbar, Lumbar spondylosis, Postlaminectomy syndrome, lumbar region, Lumbar radiculitis, and Primary insomnia were pertinent to this visit. Risks and benefits of the medications and other alternative treatments  including no treatment were discussed with the patient. The common side effects of these medications were also explained to the patient. Informed verbal consent was obtained. Goals of current treatment regimen include improvement in pain, restoration of functioning- with focus on improvement in physical performance, general activity, work or disability,emotional distress, health care utilization and  decreased medication consumption. Will continue to monitor progress towards achieving/maintaining therapeutic goals with special emphasis on  1. Improvement in perceived interfernce  of pain with ADL's. Ability to do home exercises independently. Ability to do household chores indoor and/or outdoor work and social and leisure activities. Improve psychosocial and physical functioning. - he is showing progression towards this treatment goal with the current regimen. He was advised against drinking alcohol with the narcotic pain medicines, advised against driving or handling machinery while adjusting the dose of medicines or if having cognitive  issues related to the current medications. Risk of overdose and death, if medicines not taken as prescribed, were also discussed. If the patient develops new symptoms or if the symptoms worsen, the patient should call the office. While transcribing every attempt was made to maintain the accuracy of the note in terms of it's contents,there may have been some errors made inadvertently. Thank you for allowing me to participate in the care of this patient.     Tsering Adair MD.    Cc: Jasmin Guzman MD

## 2022-01-25 DIAGNOSIS — M51.36 DDD (DEGENERATIVE DISC DISEASE), LUMBAR: ICD-10-CM

## 2022-01-25 DIAGNOSIS — M96.1 POSTLAMINECTOMY SYNDROME, LUMBAR REGION: ICD-10-CM

## 2022-01-25 DIAGNOSIS — M54.16 LUMBAR RADICULITIS: ICD-10-CM

## 2022-01-25 DIAGNOSIS — M47.816 LUMBAR SPONDYLOSIS: ICD-10-CM

## 2022-01-25 DIAGNOSIS — M79.7 FIBROMYALGIA: ICD-10-CM

## 2022-01-25 DIAGNOSIS — M54.12 BRACHIAL NEURITIS OR RADICULITIS: ICD-10-CM

## 2022-01-25 DIAGNOSIS — G89.4 CHRONIC PAIN SYNDROME: ICD-10-CM

## 2022-01-25 DIAGNOSIS — M96.1 FAILED NECK SYNDROME: ICD-10-CM

## 2022-01-31 ENCOUNTER — OFFICE VISIT (OUTPATIENT)
Dept: PAIN MANAGEMENT | Age: 62
End: 2022-01-31
Payer: COMMERCIAL

## 2022-01-31 VITALS
HEIGHT: 74 IN | OXYGEN SATURATION: 97 % | TEMPERATURE: 98.2 F | HEART RATE: 75 BPM | SYSTOLIC BLOOD PRESSURE: 157 MMHG | BODY MASS INDEX: 27.21 KG/M2 | WEIGHT: 212 LBS | DIASTOLIC BLOOD PRESSURE: 94 MMHG

## 2022-01-31 DIAGNOSIS — M96.1 FAILED NECK SYNDROME: ICD-10-CM

## 2022-01-31 DIAGNOSIS — M54.12 BRACHIAL NEURITIS OR RADICULITIS: ICD-10-CM

## 2022-01-31 DIAGNOSIS — M47.816 LUMBAR SPONDYLOSIS: ICD-10-CM

## 2022-01-31 DIAGNOSIS — M54.16 LUMBAR RADICULITIS: ICD-10-CM

## 2022-01-31 DIAGNOSIS — M96.1 POSTLAMINECTOMY SYNDROME, LUMBAR REGION: ICD-10-CM

## 2022-01-31 DIAGNOSIS — G89.4 CHRONIC PAIN SYNDROME: ICD-10-CM

## 2022-01-31 DIAGNOSIS — M79.7 FIBROMYALGIA: ICD-10-CM

## 2022-01-31 DIAGNOSIS — M51.36 DDD (DEGENERATIVE DISC DISEASE), LUMBAR: ICD-10-CM

## 2022-01-31 PROCEDURE — 99213 OFFICE O/P EST LOW 20 MIN: CPT | Performed by: INTERNAL MEDICINE

## 2022-01-31 RX ORDER — DULOXETIN HYDROCHLORIDE 30 MG/1
30 CAPSULE, DELAYED RELEASE ORAL DAILY
Qty: 30 CAPSULE | Refills: 0 | Status: SHIPPED | OUTPATIENT
Start: 2022-01-31 | End: 2022-02-28

## 2022-01-31 RX ORDER — OXYCODONE AND ACETAMINOPHEN 7.5; 325 MG/1; MG/1
1 TABLET ORAL EVERY 6 HOURS PRN
Qty: 120 TABLET | Refills: 0 | Status: SHIPPED | OUTPATIENT
Start: 2022-01-31 | End: 2022-02-28 | Stop reason: SDUPTHER

## 2022-01-31 RX ORDER — DICLOFENAC SODIUM 75 MG/1
75 TABLET, DELAYED RELEASE ORAL 2 TIMES DAILY
Qty: 60 TABLET | Refills: 1 | Status: SHIPPED | OUTPATIENT
Start: 2022-01-31 | End: 2022-02-15 | Stop reason: SDUPTHER

## 2022-01-31 RX ORDER — ZOLPIDEM TARTRATE 10 MG/1
10 TABLET ORAL NIGHTLY PRN
Qty: 30 TABLET | Refills: 0 | Status: SHIPPED | OUTPATIENT
Start: 2022-01-31 | End: 2022-02-28 | Stop reason: SDUPTHER

## 2022-01-31 NOTE — PROGRESS NOTES
Tato Lakhani  1960  <[id number]>    HISTORY OF PRESENT ILLNESS:  Mr. Chelsi Campuzano is a 62 y.o. male returns for a follow up visit for multiple medical problems. His current presenting problems are   1. Chronic pain syndrome    2. DDD (degenerative disc disease), lumbar    3. Lumbar spondylosis    4. Postlaminectomy syndrome, lumbar region    5. Lumbar radiculitis    6. Brachial neuritis or radiculitis    7. Failed neck syndrome    8. Fibromyalgia    9. Mood disorder ([facility]    . As per information/history obtained from the PADT(patient assessment and documentation tool) - He complains of pain in the neck and shoulders Bilateral with radiation to the hands Bilateral, mid back, lower back, knees Bilateral, lower leg Bilateral and feet Bilateral He rates the pain 4/10 and describes it as aching, pins and needles, piercing. Pain is made worse by: nothing, movement, walking, standing, sitting, bending, lifting. Current treatment regimen has helped relieve about 60% of the pain. He denies side effects from the current pain regimen. Patient reports that since the last follow up visit the physical functioning is unchanged, family/social relationships are unchanged, mood is unchanged and sleep patterns are unchanged, and that the overall functioning is unchanged. Patient denies neurological bowel or bladder. Patient denies misusing/abusing his narcotic pain medications or using any illegal drugs. There are No indicators for possible drug abuse, addiction or diversion problems. Upon obtaining the medical history from Mr. Chelsi Campuzano regarding today's office visit for his presenting problems, patient complains he is having increase pain in the back and neck. He states the legs feel numb. He says he is working full time still. He mentions he is using Voltaren along with Percocet 4 per day. He says he has been using all the other adjuvants. He reports its difficult to do his chores.        ALLERGIES: Patients list of allergies were reviewed     MEDICATIONS: Mr. Pricila Pavon list of medications were reviewed. His current medications are   Outpatient Medications Prior to Visit   Medication Sig Dispense Refill    zolpidem (AMBIEN) 10 MG tablet Take 1 tablet by mouth nightly as needed for Sleep for up to 30 days. 30 tablet 0    oxyCODONE-acetaminophen (PERCOCET) 7.5-325 MG per tablet Take 1 tablet by mouth every 6 hours as needed for Pain (max 4 per day) for up to 30 days. 120 tablet 0    diclofenac (VOLTAREN) 75 MG EC tablet Take 1 tablet by mouth 2 times daily 60 tablet 1    DULoxetine (CYMBALTA) 30 MG extended release capsule Take 1 capsule by mouth daily 30 capsule 0    Naloxone HCl (EVZIO) 2 MG/0.4ML SOAJ Use as directed 1 Package 0    ALPRAZolam (XANAX) 0.5 MG tablet Take 0.5 mg by mouth nightly as needed. No facility-administered medications prior to visit. REVIEW OF SYSTEMS:    Respiratory: Negative for apnea, chest tightness and shortness of breath or change in baseline breathing. PHYSICAL EXAM:   Nursing note and vitals reviewed. BP (!) 157/94   Pulse 75   Temp 98.2 °F (36.8 °C)   Ht 6' 2\" (1.88 m)   Wt 212 lb (96.2 kg)   SpO2 97%   BMI 27.22 kg/m²   Constitutional: He appears well-developed and well-nourished. No acute distress. Cardiovascular: Normal rate, regular rhythm, normal heart sounds, and does not have murmur. Pulmonary/Chest: Effort normal. No respiratory distress. He does not have wheezes in the lung fields. He has no rales. Neurological/Psychiatric:He is alert and oriented to person, place, and time. Coordination is  normal.  His mood isAppropriate and affect is Neutral/Euthymic(normal) . IMPRESSION:   1. Chronic pain syndrome    2. DDD (degenerative disc disease), lumbar    3. Lumbar spondylosis    4. Postlaminectomy syndrome, lumbar region    5. Lumbar radiculitis    6. Brachial neuritis or radiculitis    7. Failed neck syndrome    8.  Fibromyalgia PLAN:  Informed verbal consent was obtained  -ROM/Stretching exercises as advised   -He was advised to increase fluids ( 5-7  glasses of fluid daily), limit caffeine, avoid cheese products, increase dietary fiber, increase activity and exercise as tolerated and relax regularly and enjoy meals   -Continue with Percocet 4 per day   -Walking as tolerated   -CBT techniques- relaxation therapies such as biofeedback, mindfulness based stress reduction, imagery, cognitive restructuring, problem solving discussed with patient   -Start Medrol dose pack to help with pain    Current Outpatient Medications   Medication Sig Dispense Refill    zolpidem (AMBIEN) 10 MG tablet Take 1 tablet by mouth nightly as needed for Sleep for up to 30 days. 30 tablet 0    oxyCODONE-acetaminophen (PERCOCET) 7.5-325 MG per tablet Take 1 tablet by mouth every 6 hours as needed for Pain (max 4 per day) for up to 30 days. 120 tablet 0    diclofenac (VOLTAREN) 75 MG EC tablet Take 1 tablet by mouth 2 times daily 60 tablet 1    DULoxetine (CYMBALTA) 30 MG extended release capsule Take 1 capsule by mouth daily 30 capsule 0    Naloxone HCl (EVZIO) 2 MG/0.4ML SOAJ Use as directed 1 Package 0    ALPRAZolam (XANAX) 0.5 MG tablet Take 0.5 mg by mouth nightly as needed. No current facility-administered medications for this visit. I will continue his current medication regimen  which is part of the above treatment schedule. It has been helping with Mr. Jocelyn Weston chronic  medical problems which for this visit include:   Diagnoses of Chronic pain syndrome, DDD (degenerative disc disease), lumbar, Lumbar spondylosis, Postlaminectomy syndrome, lumbar region, Lumbar radiculitis, Brachial neuritis or radiculitis, Failed neck syndrome, Fibromyalgia, and Mood disorder ([facility] were pertinent to this visit. Risks and benefits of the medications and other alternative treatments  including no treatment were discussed with the patient. The common side effects of these medications were also explained to the patient. Informed verbal consent was obtained. Goals of current treatment regimen include improvement in pain, restoration of functioning- with focus on improvement in physical performance, general activity, work or disability,emotional distress, health care utilization and  decreased medication consumption. Will continue to monitor progress towards achieving/maintaining therapeutic goals with special emphasis on  1. Improvement in perceived interfernce  of pain with ADL's. Ability to do home exercises independently. Ability to do household chores indoor and/or outdoor work and social and leisure activities. Improve psychosocial and physical functioning. - he is showing progression towards this treatment goal with the current regimen. He was advised against drinking alcohol with the narcotic pain medicines, advised against driving or handling machinery while adjusting the dose of medicines or if having cognitive  issues related to the current medications. Risk of overdose and death, if medicines not taken as prescribed, were also discussed. If the patient develops new symptoms or if the symptoms worsen, the patient should call the office. While transcribing every attempt was made to maintain the accuracy of the note in terms of it's contents,there may have been some errors made inadvertently. Thank you for allowing me to participate in the care of this patient.     Kaleigh Mathis MD.    Cc: Jonathan García MD

## 2022-02-15 RX ORDER — DICLOFENAC SODIUM 75 MG/1
TABLET, DELAYED RELEASE ORAL
Qty: 60 TABLET | Refills: 1 | Status: SHIPPED | OUTPATIENT
Start: 2022-02-15 | End: 2022-02-28 | Stop reason: SDUPTHER

## 2022-02-15 NOTE — TELEPHONE ENCOUNTER
Medication:   Requested Prescriptions     Pending Prescriptions Disp Refills    diclofenac (VOLTAREN) 75 MG EC tablet [Pharmacy Med Name: DICLOFENAC SOD EC 75 MG TAB] 60 tablet 1     Sig: TAKE 1 TABLET BY MOUTH TWICE A DAY     Last Filled:    Last appt: 12/1/2021   Next appt: 2/28/22    Last Lipid: No results found for: CHOL, TRIG, HDL, LDLCALC

## 2022-02-28 ENCOUNTER — OFFICE VISIT (OUTPATIENT)
Dept: PAIN MANAGEMENT | Age: 62
End: 2022-02-28
Payer: COMMERCIAL

## 2022-02-28 VITALS
BODY MASS INDEX: 27.48 KG/M2 | OXYGEN SATURATION: 96 % | WEIGHT: 214 LBS | DIASTOLIC BLOOD PRESSURE: 85 MMHG | SYSTOLIC BLOOD PRESSURE: 138 MMHG | HEART RATE: 85 BPM

## 2022-02-28 DIAGNOSIS — G89.4 CHRONIC PAIN SYNDROME: ICD-10-CM

## 2022-02-28 DIAGNOSIS — M96.1 FAILED NECK SYNDROME: ICD-10-CM

## 2022-02-28 DIAGNOSIS — M51.36 DDD (DEGENERATIVE DISC DISEASE), LUMBAR: ICD-10-CM

## 2022-02-28 DIAGNOSIS — M54.16 LUMBAR RADICULITIS: ICD-10-CM

## 2022-02-28 DIAGNOSIS — M96.1 POSTLAMINECTOMY SYNDROME, LUMBAR REGION: ICD-10-CM

## 2022-02-28 DIAGNOSIS — M79.7 FIBROMYALGIA: ICD-10-CM

## 2022-02-28 DIAGNOSIS — M54.12 BRACHIAL NEURITIS OR RADICULITIS: ICD-10-CM

## 2022-02-28 DIAGNOSIS — M47.816 LUMBAR SPONDYLOSIS: ICD-10-CM

## 2022-02-28 PROCEDURE — 99213 OFFICE O/P EST LOW 20 MIN: CPT | Performed by: INTERNAL MEDICINE

## 2022-02-28 RX ORDER — OXYCODONE AND ACETAMINOPHEN 7.5; 325 MG/1; MG/1
1 TABLET ORAL EVERY 6 HOURS PRN
Qty: 120 TABLET | Refills: 0 | Status: SHIPPED | OUTPATIENT
Start: 2022-02-28 | End: 2022-03-28 | Stop reason: SDUPTHER

## 2022-02-28 RX ORDER — OXYCODONE AND ACETAMINOPHEN 7.5; 325 MG/1; MG/1
1 TABLET ORAL EVERY 6 HOURS PRN
Qty: 112 TABLET | Refills: 0 | Status: SHIPPED | OUTPATIENT
Start: 2022-02-28 | End: 2022-02-28 | Stop reason: CLARIF

## 2022-02-28 RX ORDER — DICLOFENAC SODIUM 75 MG/1
75 TABLET, DELAYED RELEASE ORAL 2 TIMES DAILY PRN
Qty: 60 TABLET | Refills: 1 | Status: SHIPPED | OUTPATIENT
Start: 2022-02-28 | End: 2022-03-28 | Stop reason: SDUPTHER

## 2022-02-28 RX ORDER — METHYLPREDNISOLONE 4 MG/1
TABLET ORAL
Qty: 1 KIT | Refills: 0 | Status: SHIPPED | OUTPATIENT
Start: 2022-02-28 | End: 2022-03-28

## 2022-02-28 RX ORDER — ZOLPIDEM TARTRATE 10 MG/1
10 TABLET ORAL NIGHTLY PRN
Qty: 30 TABLET | Refills: 0 | Status: SHIPPED | OUTPATIENT
Start: 2022-02-28 | End: 2022-03-28 | Stop reason: SDUPTHER

## 2022-02-28 NOTE — PROGRESS NOTES
Too Robert  1960  <[id number]>    HISTORY OF PRESENT ILLNESS:  Mr. Mary Lafleur is a 62 y.o. male returns for a follow up visit for multiple medical problems. His current presenting problems are   1. Chronic pain syndrome    2. Postlaminectomy syndrome, lumbar region    3. Failed neck syndrome    4. Primary insomnia    5. DDD (degenerative disc disease), lumbar    6. Lumbar radiculitis    7. Fibromyalgia    8. Lumbar spondylosis    9. Brachial neuritis or radiculitis    10. Mood disorder ([facility]    . As per information/history obtained from the PADT(patient assessment and documentation tool) - He complains of pain in the neck, hands Right, lower back, lower leg Right and feet Right with radiation to the shoulders Bilateral He rates the pain 4/10 and describes it as aching, stabbing. Pain is made worse by: nothing, movement, walking, standing, sitting, bending, lifting. Current treatment regimen has helped relieve about 60% of the pain. He denies side effects from the current pain regimen. Patient reports that since the last follow up visit the physical functioning is unchanged, family/social relationships are unchanged, mood is unchanged and sleep patterns are unchanged, and that the overall functioning is worse. Patient denies neurological bowel or bladder. Patient denies misusing/abusing his narcotic pain medications or using any illegal drugs. There are No indicators for possible drug abuse, addiction or diversion problems. Upon obtaining the medical history from Mr. Mary Lafleur regarding today's office visit for his presenting problems, patient states he has been doing fair, his pain has been worse in the back and neck. Mr. Mary Lafleur states he is using Voltaren along with Percocet 4 per day. He says he is doing his stretching exercises. Patient denies any constipation symptoms. ALLERGIES: Patients list of allergies were reviewed     MEDICATIONS: Mr. Mary Lafleur list of medications were reviewed. His current medications are   Outpatient Medications Prior to Visit   Medication Sig Dispense Refill    Naloxone HCl (EVZIO) 2 MG/0.4ML SOAJ Use as directed 1 Package 0    ALPRAZolam (XANAX) 0.5 MG tablet Take 0.5 mg by mouth nightly as needed.  diclofenac (VOLTAREN) 75 MG EC tablet TAKE 1 TABLET BY MOUTH TWICE A DAY 60 tablet 1    zolpidem (AMBIEN) 10 MG tablet Take 1 tablet by mouth nightly as needed for Sleep for up to 30 days. 30 tablet 0    oxyCODONE-acetaminophen (PERCOCET) 7.5-325 MG per tablet Take 1 tablet by mouth every 6 hours as needed for Pain (max 4 per day) for up to 30 days. 120 tablet 0    DULoxetine (CYMBALTA) 30 MG extended release capsule Take 1 capsule by mouth daily 30 capsule 0     No facility-administered medications prior to visit. REVIEW OF SYSTEMS:    Respiratory: Negative for apnea, chest tightness and shortness of breath or change in baseline breathing. PHYSICAL EXAM:   Nursing note and vitals reviewed. /85   Pulse 85   Wt 214 lb (97.1 kg)   SpO2 96%   BMI 27.48 kg/m²   Constitutional: He appears well-developed and well-nourished. No acute distress. Cardiovascular: Normal rate, regular rhythm, normal heart sounds, and does not have murmur. Pulmonary/Chest: Effort normal. No respiratory distress. He does not have wheezes in the lung fields. He has no rales. Neurological/Psychiatric:He is alert and oriented to person, place, and time. Coordination is  normal.  His mood isAppropriate and affect is Neutral/Euthymic(normal) . IMPRESSION:   1. Chronic pain syndrome    2. Postlaminectomy syndrome, lumbar region    3. Failed neck syndrome    4. DDD (degenerative disc disease), lumbar    5. Lumbar radiculitis    6. Fibromyalgia    7. Lumbar spondylosis    8. Brachial neuritis or radiculitis        PLAN:  Informed verbal consent was obtained  -[place] was obtained and reviewed  for the last one year and no indicators of drug misuse  were found.  Any other controlled substance prescriptions  seen on the record have been accounted for, I am aware of the patient receiving these medications. Eugenia Hernandez [place] record will be rechecked as part of office protocol. -ROM/Stretching exercises as advised   -Continue with Percocet 3-4 per day  -Start Medrol pack to help with pain flare up  -If symptoms continues will consider TPI   -He was advised to increase fluids ( 5-7  glasses of fluid daily), limit caffeine, avoid cheese products, increase dietary fiber, increase activity and exercise as tolerated and relax regularly and enjoy meals      Current Outpatient Medications   Medication Sig Dispense Refill    zolpidem (AMBIEN) 10 MG tablet Take 1 tablet by mouth nightly as needed for Sleep for up to 30 days. 30 tablet 0    oxyCODONE-acetaminophen (PERCOCET) 7.5-325 MG per tablet Take 1 tablet by mouth every 6 hours as needed for Pain (max 4 per day) for up to 28 days. 112 tablet 0    methylPREDNISolone (MEDROL, ARYA,) 4 MG tablet Use as directed 1 kit 0    diclofenac (VOLTAREN) 75 MG EC tablet Take 1 tablet by mouth 2 times daily as needed for Pain 60 tablet 1    Naloxone HCl (EVZIO) 2 MG/0.4ML SOAJ Use as directed 1 Package 0    ALPRAZolam (XANAX) 0.5 MG tablet Take 0.5 mg by mouth nightly as needed. No current facility-administered medications for this visit. I will continue his current medication regimen  which is part of the above treatment schedule. It has been helping with Mr. Vladimir De Paz chronic  medical problems which for this visit include:   Diagnoses of Chronic pain syndrome, Postlaminectomy syndrome, lumbar region, Failed neck syndrome, Primary insomnia, DDD (degenerative disc disease), lumbar, Lumbar radiculitis, Fibromyalgia, Lumbar spondylosis, Brachial neuritis or radiculitis, and Mood disorder ([facility] were pertinent to this visit.    Risks and benefits of the medications and other alternative treatments  including no treatment were discussed with the patient. The common side effects of these medications were also explained to the patient. Informed verbal consent was obtained. Goals of current treatment regimen include improvement in pain, restoration of functioning- with focus on improvement in physical performance, general activity, work or disability,emotional distress, health care utilization and  decreased opioid medication consumption- titrating to the lowest effective dose. Will continue to monitor progress towards achieving/maintaining therapeutic goals with special emphasis on  1. Improvement in perceived interfernce  of pain with ADL's. Ability to do home exercises independently. Ability to do household chores indoor and/or outdoor work and social and leisure activities. Improve psychosocial and physical functioning. - he is showing progression towards this treatment goal with the current regimen. He was advised against drinking alcohol with the narcotic pain medicines, advised against driving or handling machinery while adjusting the dose of medicines or if having cognitive  issues related to the current medications. Risk of overdose and death, if medicines not taken as prescribed, were also discussed. If the patient develops new symptoms or if the symptoms worsen, the patient should call the office. While transcribing every attempt was made to maintain the accuracy of the note in terms of it's contents,there may have been some errors made inadvertently. Thank you for allowing me to participate in the care of this patient.     Lottie Kumari MD.    Cc: Carlitos Chacon MD

## 2022-03-07 NOTE — TELEPHONE ENCOUNTER
LVM asking for the pt to return our call. Duloxitine isn't on the pt's current medication list. Please verify with the pt if he is taking this medication.

## 2022-03-28 ENCOUNTER — OFFICE VISIT (OUTPATIENT)
Dept: PAIN MANAGEMENT | Age: 62
End: 2022-03-28
Payer: COMMERCIAL

## 2022-03-28 VITALS
HEART RATE: 90 BPM | HEIGHT: 74 IN | BODY MASS INDEX: 27.46 KG/M2 | WEIGHT: 214 LBS | DIASTOLIC BLOOD PRESSURE: 91 MMHG | SYSTOLIC BLOOD PRESSURE: 154 MMHG

## 2022-03-28 DIAGNOSIS — M54.16 LUMBAR RADICULITIS: ICD-10-CM

## 2022-03-28 DIAGNOSIS — G89.4 CHRONIC PAIN SYNDROME: ICD-10-CM

## 2022-03-28 DIAGNOSIS — M79.7 FIBROMYALGIA: ICD-10-CM

## 2022-03-28 DIAGNOSIS — M96.1 POSTLAMINECTOMY SYNDROME, LUMBAR REGION: ICD-10-CM

## 2022-03-28 DIAGNOSIS — M47.816 LUMBAR SPONDYLOSIS: ICD-10-CM

## 2022-03-28 DIAGNOSIS — M96.1 FAILED NECK SYNDROME: ICD-10-CM

## 2022-03-28 DIAGNOSIS — M54.12 BRACHIAL NEURITIS OR RADICULITIS: ICD-10-CM

## 2022-03-28 DIAGNOSIS — M51.36 DDD (DEGENERATIVE DISC DISEASE), LUMBAR: ICD-10-CM

## 2022-03-28 PROCEDURE — 99213 OFFICE O/P EST LOW 20 MIN: CPT | Performed by: INTERNAL MEDICINE

## 2022-03-28 RX ORDER — ZOLPIDEM TARTRATE 10 MG/1
10 TABLET ORAL NIGHTLY PRN
Qty: 30 TABLET | Refills: 0 | Status: SHIPPED | OUTPATIENT
Start: 2022-03-28 | End: 2022-04-25 | Stop reason: SDUPTHER

## 2022-03-28 RX ORDER — METHYLPREDNISOLONE 4 MG/1
TABLET ORAL
Qty: 1 KIT | Refills: 0 | Status: SHIPPED | OUTPATIENT
Start: 2022-03-28 | End: 2022-04-25 | Stop reason: SDUPTHER

## 2022-03-28 RX ORDER — OXYCODONE AND ACETAMINOPHEN 7.5; 325 MG/1; MG/1
1 TABLET ORAL EVERY 6 HOURS PRN
Qty: 120 TABLET | Refills: 0 | Status: SHIPPED | OUTPATIENT
Start: 2022-03-28 | End: 2022-04-25 | Stop reason: SDUPTHER

## 2022-03-28 RX ORDER — DICLOFENAC SODIUM 75 MG/1
75 TABLET, DELAYED RELEASE ORAL 2 TIMES DAILY PRN
Qty: 60 TABLET | Refills: 1 | Status: SHIPPED | OUTPATIENT
Start: 2022-03-28 | End: 2022-04-25 | Stop reason: SDUPTHER

## 2022-03-28 NOTE — PROGRESS NOTES
Eva Hernandez  1960  [id number]      HISTORY OF PRESENT ILLNESS:  Mr. Jorgito Berrios is a 62 y.o. male returns for a follow up visit for pain management  He has a diagnosis of   1. Chronic pain syndrome    2. DDD (degenerative disc disease), lumbar    3. Lumbar spondylosis    4. Primary insomnia    5. Brachial neuritis or radiculitis    6. Postlaminectomy syndrome, lumbar region    7. Lumbar radiculitis    8. Failed neck syndrome    9. Mood disorder ([address]    10. Fibromyalgia    . He complains of pain in the Neck, upper and low back  He rates the pain 5/10 and describes it as sharp, aching. Current treatment regimen has helped relieve about 50% of the pain. He denies any side effects from the current pain regimen. Patient reports that since the last follow up visit the physical functioning is unchanged, family/social relationships are unchanged, mood is unchanged sleep patterns are unchanged, and that the overall functioning is unchanged. Patient denies misusing/abusing his narcotic pain medications or using any illegal drugs. There are No indicators for possible drug abuse, addiction or diversion problems. Patient states he is doing about the same. He complains his neck and back still hurts. He says he is working full time. He mentions the medrol pack helped some. He reports he still has pain. He denies any constipation symptoms. ALLERGIES: Patients list of allergies were reviewed     MEDICATIONS: Mr. Jorgito Berrios list of medications were reviewed. His current medications are   Outpatient Medications Prior to Visit   Medication Sig Dispense Refill    Naloxone HCl (EVZIO) 2 MG/0.4ML SOAJ Use as directed 1 Package 0    ALPRAZolam (XANAX) 0.5 MG tablet Take 0.5 mg by mouth nightly as needed.  zolpidem (AMBIEN) 10 MG tablet Take 1 tablet by mouth nightly as needed for Sleep for up to 30 days.  30 tablet 0    methylPREDNISolone (MEDROL, ARYA,) 4 MG tablet Use as directed 1 kit 0    diclofenac (VOLTAREN) 75 MG EC tablet Take 1 tablet by mouth 2 times daily as needed for Pain 60 tablet 1    oxyCODONE-acetaminophen (PERCOCET) 7.5-325 MG per tablet Take 1 tablet by mouth every 6 hours as needed for Pain (max 4 per day) for up to 30 days. 120 tablet 0     No facility-administered medications prior to visit. REVIEW OF SYSTEMS:    Respiratory: Negative for apnea, chest tightness and shortness of breath or change in baseline breathing. PHYSICAL EXAM:   Nursing note and vitals reviewed. BP (!) 154/91   Pulse 90   Ht 6' 2\" (1.88 m)   Wt 214 lb (97.1 kg)   BMI 27.48 kg/m²   Constitutional: He appears well-developed and well-nourished. No acute distress. Cardiovascular: Normal rate, regular rhythm, normal heart sounds, and does not have murmur. Pulmonary/Chest: Effort normal. No respiratory distress. He does not have wheezes in the lung fields. He has no rales. Neurological/Psychiatric:He is alert and oriented to person, place, and time. Coordination is  normal.  His mood isAppropriate and affect is Neutral/Euthymic(normal) . His    IMPRESSION:   1. Chronic pain syndrome    2. DDD (degenerative disc disease), lumbar    3. Lumbar spondylosis    4. Brachial neuritis or radiculitis    5. Postlaminectomy syndrome, lumbar region    6. Lumbar radiculitis    7. Failed neck syndrome    8. Fibromyalgia        PLAN:  Informed verbal consent was obtained  -ROM/Stretching exercises as advised   -He was advised to increase fluids ( 5-7  glasses of fluid daily), limit caffeine, avoid cheese products, increase dietary fiber, increase activity and exercise as tolerated and relax regularly and enjoy meals   -Will give another Medrol pack to help with pain   -Continue with all other adjuvants and Percocet 4 per day    Current Outpatient Medications   Medication Sig Dispense Refill    zolpidem (AMBIEN) 10 MG tablet Take 1 tablet by mouth nightly as needed for Sleep for up to 30 days.  30 tablet 0    diclofenac (VOLTAREN) 75 MG EC tablet Take 1 tablet by mouth 2 times daily as needed for Pain 60 tablet 1    oxyCODONE-acetaminophen (PERCOCET) 7.5-325 MG per tablet Take 1 tablet by mouth every 6 hours as needed for Pain (max 4 per day) for up to 30 days. 120 tablet 0    Naloxone HCl (EVZIO) 2 MG/0.4ML SOAJ Use as directed 1 Package 0    ALPRAZolam (XANAX) 0.5 MG tablet Take 0.5 mg by mouth nightly as needed. No current facility-administered medications for this visit. I will continue his current medication regimen  which is part of the above treatment schedule. It has been helping with Mr. Brittany Salazar chronic  medical problems which for this visit include:   Diagnoses of Chronic pain syndrome, DDD (degenerative disc disease), lumbar, Lumbar spondylosis, Primary insomnia, Brachial neuritis or radiculitis, Postlaminectomy syndrome, lumbar region, Lumbar radiculitis, Failed neck syndrome, Mood disorder ([address] and Fibromyalgia were pertinent to this visit. Risks and benefits of the medications and other alternative treatments  including no treatment were discussed with the patient. The common side effects of these medications were also explained to the patient. Informed verbal consent was obtained. Goals of current treatment regimen include improvement in pain, restoration of functioning- with focus on improvement in physical performance, general activity, work or disability,emotional distress, health care utilization and  decreased medication consumption. Will continue to monitor progress towards achieving/maintaining therapeutic goals with special emphasis on  1. Improvement in perceived interfernce  of pain with ADL's. Ability to do home exercises independently. Ability to do household chores indoor and/or outdoor work and social and leisure activities. Improve psychosocial and physical functioning. - he is showing progression towards this treatment goal with the current regimen.    2. Ability to focus/concentrate at work and perform the duties required of him at work  Sit through a workday without lower extremity symptoms. Stand 30-60 minutes without lower extremity symptoms. Ability to lift up to 10-20 lbs. Ability to go up and down stairs. Sit 30-60 minutes  Without having to stand up frequently. - he is maintaining/progressing towards his work related goals with the current regimen. He was advised against drinking alcohol with the narcotic pain medicines, advised against driving or handling machinery while adjusting the dose of medicines or if having cognitive  issues related to the current medications. Risk of overdose and death, if medicines not taken as prescribed, were also discussed. If the patient develops new symptoms or if the symptoms worsen, the patient should call the office. While transcribing every attempt was made to maintain the accuracy of the note in terms of it's contents,there may have been some errors made inadvertently. Thank you for allowing me to participate in the care of this patient.     Margarito Lange MD.    Cc: Viraj Snider MD

## 2022-04-07 RX ORDER — DULOXETIN HYDROCHLORIDE 30 MG/1
CAPSULE, DELAYED RELEASE ORAL
Qty: 30 CAPSULE | Refills: 0 | OUTPATIENT
Start: 2022-04-07

## 2022-04-25 ENCOUNTER — OFFICE VISIT (OUTPATIENT)
Dept: PAIN MANAGEMENT | Age: 62
End: 2022-04-25
Payer: COMMERCIAL

## 2022-04-25 VITALS
HEART RATE: 80 BPM | DIASTOLIC BLOOD PRESSURE: 98 MMHG | WEIGHT: 209 LBS | SYSTOLIC BLOOD PRESSURE: 158 MMHG | OXYGEN SATURATION: 96 % | BODY MASS INDEX: 26.83 KG/M2

## 2022-04-25 DIAGNOSIS — M96.1 POSTLAMINECTOMY SYNDROME, LUMBAR REGION: ICD-10-CM

## 2022-04-25 DIAGNOSIS — G89.4 CHRONIC PAIN SYNDROME: ICD-10-CM

## 2022-04-25 DIAGNOSIS — M96.1 FAILED NECK SYNDROME: ICD-10-CM

## 2022-04-25 DIAGNOSIS — M47.816 LUMBAR SPONDYLOSIS: ICD-10-CM

## 2022-04-25 DIAGNOSIS — M79.7 FIBROMYALGIA: ICD-10-CM

## 2022-04-25 DIAGNOSIS — M51.36 DDD (DEGENERATIVE DISC DISEASE), LUMBAR: ICD-10-CM

## 2022-04-25 DIAGNOSIS — M54.12 BRACHIAL NEURITIS OR RADICULITIS: ICD-10-CM

## 2022-04-25 DIAGNOSIS — M54.16 LUMBAR RADICULITIS: ICD-10-CM

## 2022-04-25 PROCEDURE — 99213 OFFICE O/P EST LOW 20 MIN: CPT | Performed by: INTERNAL MEDICINE

## 2022-04-25 RX ORDER — OXYCODONE AND ACETAMINOPHEN 7.5; 325 MG/1; MG/1
1 TABLET ORAL EVERY 6 HOURS PRN
Qty: 120 TABLET | Refills: 0 | Status: SHIPPED | OUTPATIENT
Start: 2022-04-25 | End: 2022-05-23 | Stop reason: SDUPTHER

## 2022-04-25 RX ORDER — ZOLPIDEM TARTRATE 10 MG/1
10 TABLET ORAL NIGHTLY PRN
Qty: 30 TABLET | Refills: 0 | Status: SHIPPED | OUTPATIENT
Start: 2022-04-25 | End: 2022-05-23 | Stop reason: SDUPTHER

## 2022-04-25 RX ORDER — DICLOFENAC SODIUM 75 MG/1
75 TABLET, DELAYED RELEASE ORAL 2 TIMES DAILY PRN
Qty: 60 TABLET | Refills: 1 | Status: SHIPPED | OUTPATIENT
Start: 2022-04-25 | End: 2022-05-23 | Stop reason: SDUPTHER

## 2022-04-25 RX ORDER — METHYLPREDNISOLONE 4 MG/1
TABLET ORAL
Qty: 1 KIT | Refills: 0 | Status: SHIPPED | OUTPATIENT
Start: 2022-04-25 | End: 2022-05-23

## 2022-04-25 NOTE — PROGRESS NOTES
Patti Dixon  1960  [id number]      HISTORY OF PRESENT ILLNESS:  Mr. Malaika Camejo is a 62 y.o. male returns for a follow up visit for pain management  He has a diagnosis of   1. Chronic pain syndrome    2. DDD (degenerative disc disease), lumbar    3. Lumbar spondylosis    4. Brachial neuritis or radiculitis    5. Postlaminectomy syndrome, lumbar region    6. Lumbar radiculitis    7. Failed neck syndrome    8. Fibromyalgia    . He complains of pain in the head upper back, lower back, left hand with radiation to the neck, right leg, right knee, right ankle, right foot  He rates the pain 7/10 and describes it as sharp, aching, numbness, pins and needles. Current treatment regimen has helped relieve about 30% of the pain. He denies any side effects from the current pain regimen. Patient reports that since the last follow up visit the physical functioning is worse, family/social relationships are unchanged, mood is unchanged sleep patterns are worse, and that the overall functioning is unchanged. Patient denies misusing/abusing his narcotic pain medications or using any illegal drugs. There are No indicators for possible drug abuse, addiction or diversion problems, patient states he has been having increase pain. Mr. Malaika Camejo is complaining of his back hurting worse than his leg. He states he cleaned his garage recently and has increased pain since. He mentions he is using Percocet 4 per day along with the other adjuvants. Patient reports he is working full roddy still. ALLERGIES: Patients list of allergies were reviewed     MEDICATIONS: Mr. Malaika Camejo list of medications were reviewed. His current medications are   Outpatient Medications Prior to Visit   Medication Sig Dispense Refill    Naloxone HCl (EVZIO) 2 MG/0.4ML SOAJ Use as directed 1 Package 0    ALPRAZolam (XANAX) 0.5 MG tablet Take 0.5 mg by mouth nightly as needed.         zolpidem (AMBIEN) 10 MG tablet Take 1 tablet by mouth nightly as needed for Sleep for up to 30 days. 30 tablet 0    diclofenac (VOLTAREN) 75 MG EC tablet Take 1 tablet by mouth 2 times daily as needed for Pain 60 tablet 1    oxyCODONE-acetaminophen (PERCOCET) 7.5-325 MG per tablet Take 1 tablet by mouth every 6 hours as needed for Pain (max 4 per day) for up to 30 days. 120 tablet 0    methylPREDNISolone (MEDROL, ARYA,) 4 MG tablet Use as directed 1 kit 0     No facility-administered medications prior to visit. REVIEW OF SYSTEMS:    Respiratory: Negative for apnea, chest tightness and shortness of breath or change in baseline breathing. PHYSICAL EXAM:   Nursing note and vitals reviewed. BP (!) 158/98 (Site: Right Upper Arm, Position: Sitting)   Pulse 80   Wt 209 lb (94.8 kg)   SpO2 96%   BMI 26.83 kg/m²   Constitutional: He appears well-developed and well-nourished. No acute distress. Cardiovascular: Normal rate, regular rhythm, normal heart sounds, and does not have murmur. Pulmonary/Chest: Effort normal. No respiratory distress. He does not have wheezes in the lung fields. He has no rales. Neurological/Psychiatric:He is alert and oriented to person, place, and time. Coordination is  normal.  His mood isAppropriate and affect is Neutral/Euthymic(normal) . His    IMPRESSION:   1. Chronic pain syndrome    2. DDD (degenerative disc disease), lumbar    3. Lumbar spondylosis    4. Brachial neuritis or radiculitis    5. Postlaminectomy syndrome, lumbar region    6. Lumbar radiculitis    7. Failed neck syndrome    8.  Fibromyalgia        PLAN:  Informed verbal consent was obtained  -Patient wants to try Medrol pack again   -Continue with Voltaren 75 mg daily   -Continue with Percocet 4 per day  -Back stretching exercises as advised   -He was advised to increase fluids ( 5-7  glasses of fluid daily), limit caffeine, avoid cheese products, increase dietary fiber, increase activity and exercise as tolerated and relax regularly and enjoy meals      Current Outpatient Medications   Medication Sig Dispense Refill    zolpidem (AMBIEN) 10 MG tablet Take 1 tablet by mouth nightly as needed for Sleep for up to 30 days. 30 tablet 0    diclofenac (VOLTAREN) 75 MG EC tablet Take 1 tablet by mouth 2 times daily as needed for Pain 60 tablet 1    oxyCODONE-acetaminophen (PERCOCET) 7.5-325 MG per tablet Take 1 tablet by mouth every 6 hours as needed for Pain (max 4 per day) for up to 30 days. 120 tablet 0    methylPREDNISolone (MEDROL, ARYA,) 4 MG tablet Use as directed 1 kit 0    Naloxone HCl (EVZIO) 2 MG/0.4ML SOAJ Use as directed 1 Package 0    ALPRAZolam (XANAX) 0.5 MG tablet Take 0.5 mg by mouth nightly as needed. No current facility-administered medications for this visit. I will continue his current medication regimen  which is part of the above treatment schedule. It has been helping with Mr. Anish Jimenez chronic  medical problems which for this visit include:   Diagnoses of Chronic pain syndrome, DDD (degenerative disc disease), lumbar, Lumbar spondylosis, Brachial neuritis or radiculitis, Postlaminectomy syndrome, lumbar region, Lumbar radiculitis, Failed neck syndrome, and Fibromyalgia were pertinent to this visit. Risks and benefits of the medications and other alternative treatments  including no treatment were discussed with the patient. The common side effects of these medications were also explained to the patient. Informed verbal consent was obtained. Goals of current treatment regimen include improvement in pain, restoration of functioning- with focus on improvement in physical performance, general activity, work or disability,emotional distress, health care utilization and  decreased medication consumption. Will continue to monitor progress towards achieving/maintaining therapeutic goals with special emphasis on  1. Improvement in perceived interfernce  of pain with ADL's. Ability to do home exercises independently.  Ability to do household chores indoor and/or outdoor work and social and leisure activities. Improve psychosocial and physical functioning. - he is showing progression towards this treatment goal with the current regimen. He was advised against drinking alcohol with the narcotic pain medicines, advised against driving or handling machinery while adjusting the dose of medicines or if having cognitive  issues related to the current medications. Risk of overdose and death, if medicines not taken as prescribed, were also discussed. If the patient develops new symptoms or if the symptoms worsen, the patient should call the office. While transcribing every attempt was made to maintain the accuracy of the note in terms of it's contents,there may have been some errors made inadvertently. Thank you for allowing me to participate in the care of this patient.     Raghavendra Garcia MD.    Cc: Nara Farnsworth MD

## 2022-05-23 ENCOUNTER — OFFICE VISIT (OUTPATIENT)
Dept: PAIN MANAGEMENT | Age: 62
End: 2022-05-23
Payer: COMMERCIAL

## 2022-05-23 VITALS
BODY MASS INDEX: 26.58 KG/M2 | SYSTOLIC BLOOD PRESSURE: 157 MMHG | DIASTOLIC BLOOD PRESSURE: 93 MMHG | HEART RATE: 82 BPM | WEIGHT: 207 LBS

## 2022-05-23 DIAGNOSIS — M79.7 FIBROMYALGIA: ICD-10-CM

## 2022-05-23 DIAGNOSIS — M54.12 BRACHIAL NEURITIS OR RADICULITIS: ICD-10-CM

## 2022-05-23 DIAGNOSIS — M96.1 POSTLAMINECTOMY SYNDROME, LUMBAR REGION: ICD-10-CM

## 2022-05-23 DIAGNOSIS — M47.816 LUMBAR SPONDYLOSIS: ICD-10-CM

## 2022-05-23 DIAGNOSIS — M54.16 LUMBAR RADICULITIS: ICD-10-CM

## 2022-05-23 DIAGNOSIS — M96.1 FAILED NECK SYNDROME: ICD-10-CM

## 2022-05-23 DIAGNOSIS — M51.36 DDD (DEGENERATIVE DISC DISEASE), LUMBAR: ICD-10-CM

## 2022-05-23 DIAGNOSIS — G89.4 CHRONIC PAIN SYNDROME: ICD-10-CM

## 2022-05-23 PROCEDURE — 99213 OFFICE O/P EST LOW 20 MIN: CPT | Performed by: INTERNAL MEDICINE

## 2022-05-23 RX ORDER — OXYCODONE AND ACETAMINOPHEN 7.5; 325 MG/1; MG/1
1 TABLET ORAL EVERY 6 HOURS PRN
Qty: 120 TABLET | Refills: 0 | Status: SHIPPED | OUTPATIENT
Start: 2022-05-23 | End: 2022-06-20 | Stop reason: SDUPTHER

## 2022-05-23 RX ORDER — ZOLPIDEM TARTRATE 10 MG/1
10 TABLET ORAL NIGHTLY PRN
Qty: 30 TABLET | Refills: 0 | Status: SHIPPED | OUTPATIENT
Start: 2022-05-23 | End: 2022-06-20 | Stop reason: SDUPTHER

## 2022-05-23 RX ORDER — DICLOFENAC SODIUM 75 MG/1
75 TABLET, DELAYED RELEASE ORAL 2 TIMES DAILY PRN
Qty: 60 TABLET | Refills: 1 | Status: SHIPPED | OUTPATIENT
Start: 2022-05-23 | End: 2022-06-20 | Stop reason: SDUPTHER

## 2022-05-23 NOTE — PROGRESS NOTES
Le Xiao  1960  [id number]      HISTORY OF PRESENT ILLNESS:  Mr. Jas Whitlock is a 62 y.o. male returns for a follow up visit for pain management  He has a diagnosis of   1. Chronic pain syndrome    2. DDD (degenerative disc disease), lumbar    3. Lumbar spondylosis    4. Lumbar radiculitis    5. Postlaminectomy syndrome, lumbar region    6. Fibromyalgia    7. Failed neck syndrome    8. Brachial neuritis or radiculitis    . He complains of pain in the head, neck, upper back, lower back with radiation left hand, left knee, left foot  He rates the pain 7/10 and describes it as sharp, aching, pins and needles. Current treatment regimen has helped relieve about 30% of the pain. He denies any side effects from the current pain regimen. Patient reports that since the last follow up visit the physical functioning is unchanged, family/social relationships are unchanged, mood is unchanged sleep patterns are unchanged, and that the overall functioning is unchanged. Patient denies misusing/abusing his narcotic pain medications or using any illegal drugs. There are No indicators for possible drug abuse, addiction or diversion problems, patient states he has been doing fair. Mr. Jas Whitlock states his back is hurting more from increased physical activity. He mentions he is using Percocet along with Voltaren and Ambien PRN for sleep. Patient denies any constipation symptoms. ALLERGIES: Patients list of allergies were reviewed     MEDICATIONS: Mr. Jas Whitlock list of medications were reviewed. His current medications are   Outpatient Medications Prior to Visit   Medication Sig Dispense Refill    zolpidem (AMBIEN) 10 MG tablet Take 1 tablet by mouth nightly as needed for Sleep for up to 30 days.  30 tablet 0    diclofenac (VOLTAREN) 75 MG EC tablet Take 1 tablet by mouth 2 times daily as needed for Pain 60 tablet 1    oxyCODONE-acetaminophen (PERCOCET) 7.5-325 MG per tablet Take 1 tablet by mouth every 6 hours as needed for Pain (max 4 per day) for up to 30 days. 120 tablet 0    Naloxone HCl (EVZIO) 2 MG/0.4ML SOAJ Use as directed 1 Package 0    ALPRAZolam (XANAX) 0.5 MG tablet Take 0.5 mg by mouth nightly as needed.  methylPREDNISolone (MEDROL, ARYA,) 4 MG tablet Use as directed 1 kit 0     No facility-administered medications prior to visit. REVIEW OF SYSTEMS:    Respiratory: Negative for apnea, chest tightness and shortness of breath or change in baseline breathing. PHYSICAL EXAM:   Nursing note and vitals reviewed. BP (!) 157/93   Pulse 82   Wt 207 lb (93.9 kg)   BMI 26.58 kg/m²   Constitutional: He appears well-developed and well-nourished. No acute distress. Cardiovascular: Normal rate, regular rhythm, normal heart sounds, and does not have murmur. Pulmonary/Chest: Effort normal. No respiratory distress. He does not have wheezes in the lung fields. He has no rales. Neurological/Psychiatric:He is alert and oriented to person, place, and time. Coordination is  normal.  His mood isAppropriate and affect is Neutral/Euthymic(normal) . His    IMPRESSION:   1. Chronic pain syndrome    2. DDD (degenerative disc disease), lumbar    3. Lumbar spondylosis    4. Lumbar radiculitis    5. Postlaminectomy syndrome, lumbar region    6. Fibromyalgia    7. Failed neck syndrome    8. Brachial neuritis or radiculitis        PLAN:  Informed verbal consent was obtained  -Continue with Percocet 4 per day  -Back stretching exercises as advised   -Continue with Voltaren   -Urine drug screen with GC/MS for opiates and drugs of abuse was ordered and will follow up on results. -Walking as tolerated       Current Outpatient Medications   Medication Sig Dispense Refill    zolpidem (AMBIEN) 10 MG tablet Take 1 tablet by mouth nightly as needed for Sleep for up to 30 days.  30 tablet 0    diclofenac (VOLTAREN) 75 MG EC tablet Take 1 tablet by mouth 2 times daily as needed for Pain 60 tablet 1    oxyCODONE-acetaminophen (PERCOCET) 7.5-325 MG per tablet Take 1 tablet by mouth every 6 hours as needed for Pain (max 4 per day) for up to 30 days. 120 tablet 0    Naloxone HCl (EVZIO) 2 MG/0.4ML SOAJ Use as directed 1 Package 0    ALPRAZolam (XANAX) 0.5 MG tablet Take 0.5 mg by mouth nightly as needed. No current facility-administered medications for this visit. I will continue his current medication regimen  which is part of the above treatment schedule. It has been helping with Mr. Abbe Schmid chronic  medical problems which for this visit include:   Diagnoses of Chronic pain syndrome, DDD (degenerative disc disease), lumbar, Lumbar spondylosis, Lumbar radiculitis, Postlaminectomy syndrome, lumbar region, Fibromyalgia, Failed neck syndrome, and Brachial neuritis or radiculitis were pertinent to this visit. Risks and benefits of the medications and other alternative treatments  including no treatment were discussed with the patient. The common side effects of these medications were also explained to the patient. Informed verbal consent was obtained. Goals of current treatment regimen include improvement in pain, restoration of functioning- with focus on improvement in physical performance, general activity, work or disability,emotional distress, health care utilization and  decreased medication consumption. Will continue to monitor progress towards achieving/maintaining therapeutic goals with special emphasis on  1. Improvement in perceived interfernce  of pain with ADL's. Ability to do home exercises independently. Ability to do household chores indoor and/or outdoor work and social and leisure activities. Improve psychosocial and physical functioning. - he is showing progression towards this treatment goal with the current regimen.      He was advised against drinking alcohol with the narcotic pain medicines, advised against driving or handling machinery while adjusting the dose of medicines or if having cognitive  issues related to the current medications. Risk of overdose and death, if medicines not taken as prescribed, were also discussed. If the patient develops new symptoms or if the symptoms worsen, the patient should call the office. While transcribing every attempt was made to maintain the accuracy of the note in terms of it's contents,there may have been some errors made inadvertently. Thank you for allowing me to participate in the care of this patient.     Yfn Young MD.    Cc: Raghu Aponte MD

## 2022-06-20 ENCOUNTER — OFFICE VISIT (OUTPATIENT)
Dept: PAIN MANAGEMENT | Age: 62
End: 2022-06-20
Payer: COMMERCIAL

## 2022-06-20 VITALS
WEIGHT: 207 LBS | HEIGHT: 74 IN | SYSTOLIC BLOOD PRESSURE: 162 MMHG | HEART RATE: 91 BPM | DIASTOLIC BLOOD PRESSURE: 100 MMHG | BODY MASS INDEX: 26.56 KG/M2 | OXYGEN SATURATION: 97 %

## 2022-06-20 DIAGNOSIS — M51.36 DDD (DEGENERATIVE DISC DISEASE), LUMBAR: ICD-10-CM

## 2022-06-20 DIAGNOSIS — M47.816 LUMBAR SPONDYLOSIS: ICD-10-CM

## 2022-06-20 DIAGNOSIS — M79.7 FIBROMYALGIA: ICD-10-CM

## 2022-06-20 DIAGNOSIS — M54.12 BRACHIAL NEURITIS OR RADICULITIS: ICD-10-CM

## 2022-06-20 DIAGNOSIS — G89.4 CHRONIC PAIN SYNDROME: ICD-10-CM

## 2022-06-20 DIAGNOSIS — M54.16 LUMBAR RADICULITIS: ICD-10-CM

## 2022-06-20 DIAGNOSIS — M96.1 POSTLAMINECTOMY SYNDROME, LUMBAR REGION: ICD-10-CM

## 2022-06-20 DIAGNOSIS — M96.1 FAILED NECK SYNDROME: ICD-10-CM

## 2022-06-20 PROCEDURE — 99213 OFFICE O/P EST LOW 20 MIN: CPT | Performed by: INTERNAL MEDICINE

## 2022-06-20 RX ORDER — ZOLPIDEM TARTRATE 10 MG/1
10 TABLET ORAL NIGHTLY PRN
Qty: 30 TABLET | Refills: 0 | Status: SHIPPED | OUTPATIENT
Start: 2022-06-20 | End: 2022-07-18 | Stop reason: SDUPTHER

## 2022-06-20 RX ORDER — OXYCODONE AND ACETAMINOPHEN 7.5; 325 MG/1; MG/1
1 TABLET ORAL EVERY 6 HOURS PRN
Qty: 120 TABLET | Refills: 0 | Status: SHIPPED | OUTPATIENT
Start: 2022-06-20 | End: 2022-07-18 | Stop reason: SDUPTHER

## 2022-06-20 RX ORDER — DICLOFENAC SODIUM 75 MG/1
75 TABLET, DELAYED RELEASE ORAL 2 TIMES DAILY PRN
Qty: 60 TABLET | Refills: 1 | Status: SHIPPED | OUTPATIENT
Start: 2022-06-20 | End: 2022-07-18 | Stop reason: SDUPTHER

## 2022-06-20 NOTE — PROGRESS NOTES
Reese Rob  1960  [id number]      HISTORY OF PRESENT ILLNESS:  Mr. Ingris Wheat is a 62 y.o. male returns for a follow up visit for pain management  He has a diagnosis of   1. Chronic pain syndrome    2. DDD (degenerative disc disease), lumbar    3. Lumbar spondylosis    4. Lumbar radiculitis    5. Postlaminectomy syndrome, lumbar region    6. Fibromyalgia    7. Failed neck syndrome    . He complains of pain in the neck, upper back, lower back, left hand, right lower leg, right foot  He rates the pain 6/10 and describes it as sharp, aching, burning, numbness, pins and needles. Current treatment regimen has helped relieve about 40% of the pain. He denies any side effects from the current pain regimen. Patient reports that since the last follow up visit the physical functioning is unchanged, family/social relationships are unchanged, mood is unchanged sleep patterns are unchanged, and that the overall functioning is unchanged. Patient denies misusing/abusing his narcotic pain medications or using any illegal drugs. There are No indicators for possible drug abuse, addiction or diversion problems, patient states he has been doing fair. Mr. Ingris Wheat states he has been compliant with his medication. He mentions he is using Percocet 4 per day along with the other adjuvants. Patient says he wants another Steroid pack because of a lot bending and twisting at work. ALLERGIES: Patients list of allergies were reviewed     MEDICATIONS: Mr. Ingris Wheat list of medications were reviewed. His current medications are   Outpatient Medications Prior to Visit   Medication Sig Dispense Refill    zolpidem (AMBIEN) 10 MG tablet Take 1 tablet by mouth nightly as needed for Sleep for up to 30 days.  30 tablet 0    diclofenac (VOLTAREN) 75 MG EC tablet Take 1 tablet by mouth 2 times daily as needed for Pain 60 tablet 1    oxyCODONE-acetaminophen (PERCOCET) 7.5-325 MG per tablet Take 1 tablet by mouth every 6 hours as needed for Pain (max 4 per day) for up to 30 days. 120 tablet 0    Naloxone HCl (EVZIO) 2 MG/0.4ML SOAJ Use as directed 1 Package 0    ALPRAZolam (XANAX) 0.5 MG tablet Take 0.5 mg by mouth nightly as needed. No facility-administered medications prior to visit. REVIEW OF SYSTEMS:    Respiratory: Negative for apnea, chest tightness and shortness of breath or change in baseline breathing. PHYSICAL EXAM:   Nursing note and vitals reviewed. BP (!) 162/100 (Site: Left Upper Arm, Position: Sitting)   Pulse 91   Ht 6' 2\" (1.88 m)   Wt 207 lb (93.9 kg)   SpO2 97%   BMI 26.58 kg/m²   Constitutional: He appears well-developed and well-nourished. No acute distress. Cardiovascular: Normal rate, regular rhythm, normal heart sounds, and does not have murmur. Pulmonary/Chest: Effort normal. No respiratory distress. He does not have wheezes in the lung fields. He has no rales. Neurological/Psychiatric:He is alert and oriented to person, place, and time. Coordination is  normal.  His mood isAppropriate and affect is Neutral/Euthymic(normal) . His    IMPRESSION:   1. Chronic pain syndrome    2. DDD (degenerative disc disease), lumbar    3. Lumbar spondylosis    4. Lumbar radiculitis    5. Postlaminectomy syndrome, lumbar region    6. Fibromyalgia    7. Failed neck syndrome    8. Brachial neuritis or radiculitis        PLAN:  Informed verbal consent was obtained  -Labs ordered CBC, CMP, and TSH. -Advised to wait on the Medrol pack/TPI, he has had 3-4 in the last 6 months   -Starting adjuvants discussed with the patient   -Patient's urine drug screen results with GC/MS confirmation were obtained and reviewed and were negative for any illicit drugs. Prescribed medications were within acceptable range. Current Outpatient Medications   Medication Sig Dispense Refill    zolpidem (AMBIEN) 10 MG tablet Take 1 tablet by mouth nightly as needed for Sleep for up to 30 days.  30 tablet 0    diclofenac (VOLTAREN) 75 MG EC tablet Take 1 tablet by mouth 2 times daily as needed for Pain 60 tablet 1    oxyCODONE-acetaminophen (PERCOCET) 7.5-325 MG per tablet Take 1 tablet by mouth every 6 hours as needed for Pain (max 4 per day) for up to 30 days. 120 tablet 0    Naloxone HCl (EVZIO) 2 MG/0.4ML SOAJ Use as directed 1 Package 0    ALPRAZolam (XANAX) 0.5 MG tablet Take 0.5 mg by mouth nightly as needed. No current facility-administered medications for this visit. I will continue his current medication regimen  which is part of the above treatment schedule. It has been helping with Mr. Hazel Wiley chronic  medical problems which for this visit include:   Diagnoses of Chronic pain syndrome, DDD (degenerative disc disease), lumbar, Lumbar spondylosis, Lumbar radiculitis, Postlaminectomy syndrome, lumbar region, Fibromyalgia, and Failed neck syndrome were pertinent to this visit. Risks and benefits of the medications and other alternative treatments  including no treatment were discussed with the patient. The common side effects of these medications were also explained to the patient. Informed verbal consent was obtained. Goals of current treatment regimen include improvement in pain, restoration of functioning- with focus on improvement in physical performance, general activity, work or disability,emotional distress, health care utilization and  decreased medication consumption. Will continue to monitor progress towards achieving/maintaining therapeutic goals with special emphasis on  1. Improvement in perceived interfernce  of pain with ADL's. Ability to do home exercises independently. Ability to do household chores indoor and/or outdoor work and social and leisure activities. Improve psychosocial and physical functioning. - he is showing progression towards this treatment goal with the current regimen.      He was advised against drinking alcohol with the narcotic pain medicines, advised against driving or handling machinery while adjusting the dose of medicines or if having cognitive  issues related to the current medications. Risk of overdose and death, if medicines not taken as prescribed, were also discussed. If the patient develops new symptoms or if the symptoms worsen, the patient should call the office. While transcribing every attempt was made to maintain the accuracy of the note in terms of it's contents,there may have been some errors made inadvertently. Thank you for allowing me to participate in the care of this patient.     Heidi Jaquez MD.    Cc: Aixa Thomason MD

## 2022-07-18 ENCOUNTER — OFFICE VISIT (OUTPATIENT)
Dept: PAIN MANAGEMENT | Age: 62
End: 2022-07-18
Payer: COMMERCIAL

## 2022-07-18 VITALS
SYSTOLIC BLOOD PRESSURE: 157 MMHG | HEART RATE: 76 BPM | OXYGEN SATURATION: 98 % | BODY MASS INDEX: 26.69 KG/M2 | HEIGHT: 74 IN | WEIGHT: 208 LBS | DIASTOLIC BLOOD PRESSURE: 94 MMHG

## 2022-07-18 DIAGNOSIS — M96.1 FAILED NECK SYNDROME: ICD-10-CM

## 2022-07-18 DIAGNOSIS — M47.816 LUMBAR SPONDYLOSIS: ICD-10-CM

## 2022-07-18 DIAGNOSIS — M54.12 BRACHIAL NEURITIS OR RADICULITIS: ICD-10-CM

## 2022-07-18 DIAGNOSIS — M79.7 FIBROMYALGIA: ICD-10-CM

## 2022-07-18 DIAGNOSIS — M51.36 DDD (DEGENERATIVE DISC DISEASE), LUMBAR: ICD-10-CM

## 2022-07-18 DIAGNOSIS — M54.16 LUMBAR RADICULITIS: ICD-10-CM

## 2022-07-18 DIAGNOSIS — M96.1 POSTLAMINECTOMY SYNDROME, LUMBAR REGION: ICD-10-CM

## 2022-07-18 DIAGNOSIS — G89.4 CHRONIC PAIN SYNDROME: ICD-10-CM

## 2022-07-18 PROCEDURE — 20553 NJX 1/MLT TRIGGER POINTS 3/>: CPT | Performed by: INTERNAL MEDICINE

## 2022-07-18 PROCEDURE — 99214 OFFICE O/P EST MOD 30 MIN: CPT | Performed by: INTERNAL MEDICINE

## 2022-07-18 RX ORDER — ZOLPIDEM TARTRATE 10 MG/1
10 TABLET ORAL NIGHTLY PRN
Qty: 30 TABLET | Refills: 0 | Status: SHIPPED | OUTPATIENT
Start: 2022-07-18 | End: 2022-08-15 | Stop reason: SDUPTHER

## 2022-07-18 RX ORDER — TRIAMCINOLONE ACETONIDE 40 MG/ML
40 INJECTION, SUSPENSION INTRA-ARTICULAR; INTRAMUSCULAR ONCE
Status: COMPLETED | OUTPATIENT
Start: 2022-07-18 | End: 2022-07-18

## 2022-07-18 RX ORDER — DICLOFENAC SODIUM 75 MG/1
75 TABLET, DELAYED RELEASE ORAL 2 TIMES DAILY PRN
Qty: 60 TABLET | Refills: 1 | Status: SHIPPED | OUTPATIENT
Start: 2022-07-18 | End: 2022-08-15 | Stop reason: SDUPTHER

## 2022-07-18 RX ORDER — OXYCODONE AND ACETAMINOPHEN 7.5; 325 MG/1; MG/1
1 TABLET ORAL EVERY 6 HOURS PRN
Qty: 120 TABLET | Refills: 0 | Status: SHIPPED | OUTPATIENT
Start: 2022-07-18 | End: 2022-08-15 | Stop reason: SDUPTHER

## 2022-07-18 RX ADMIN — TRIAMCINOLONE ACETONIDE 40 MG: 40 INJECTION, SUSPENSION INTRA-ARTICULAR; INTRAMUSCULAR at 09:53

## 2022-07-18 NOTE — PROGRESS NOTES
Karime Garcia  1960  [id number]    HISTORY OF PRESENT ILLNESS:  Mr. Refugio Gutierrez is a 62 y.o. male returns for a follow up visit for multiple medical problems. His  presenting problems are   1. Chronic pain syndrome    2. DDD (degenerative disc disease), lumbar    3. Lumbar spondylosis    4. Lumbar radiculitis    5. Postlaminectomy syndrome, lumbar region    6. Fibromyalgia    7. Failed neck syndrome    . As per information/history obtained from the PADT(patient assessment and documentation tool) -  He complains of pain in the neck, upper back, mid back, lower back, and knees Bilateral with radiation to the hands Left, hips Bilateral, upper leg Bilateral, lower leg Bilateral, ankles Bilateral, and feet Bilateral He rates the pain 7/10 and describes it as sharp, burning, pins and needles. Pain is made worse by: nothing, movement, walking, standing, sitting, bending, lifting. Current treatment regimen has helped relieve about 30% of the pain. He denies side effects from the current pain regimen. Patient reports that since the last follow up visit the physical functioning is worse, family/social relationships are unchanged, mood is unchanged and sleep patterns are unchanged, and that the overall functioning is worse. Patient denies neurological bowel or bladder. Patient denies misusing/abusing his narcotic pain medications or using any illegal drugs. There are No indicators for possible drug abuse, addiction or diversion problems. Upon obtaining the medical history from Mr. Refugio Gutierrez regarding today's office visit for his presenting problems, patient states he is having increase pain in the neck and neck. Patient says the pain is greater in the back than the neck. Mr. Nara Baker says his leg and feet are numb with pain shooting down to the right groin area, foot and leg. He states he can't take any of the adjuvants, \"makes me feel different. \" Patient states the Medrol pack helps temporally.  Patient states his sleep is fair. Has fairly normal sleep latency. Averages about 4-6 hours of sleep a night. Denies any signs of sleep apnea. Feels somewhat rested in the morning, he is on Ambien. Patient states it is difficult to keep working. ALLERGIES/PAST MED/FAM/SOC HISTORY: Mr. Araceli Luis allergies, past medical, family and social history were reviewed in the chart. Mr. Araceli Luis current medications are   Outpatient Medications Prior to Visit   Medication Sig Dispense Refill    zolpidem (AMBIEN) 10 MG tablet Take 1 tablet by mouth nightly as needed for Sleep for up to 30 days. 30 tablet 0    oxyCODONE-acetaminophen (PERCOCET) 7.5-325 MG per tablet Take 1 tablet by mouth every 6 hours as needed for Pain (max 4 per day) for up to 30 days. 120 tablet 0    diclofenac (VOLTAREN) 75 MG EC tablet Take 1 tablet by mouth 2 times daily as needed for Pain 60 tablet 1    Naloxone HCl (EVZIO) 2 MG/0.4ML SOAJ Use as directed 1 Package 0    ALPRAZolam (XANAX) 0.5 MG tablet Take 0.5 mg by mouth nightly as needed. No facility-administered medications prior to visit. REVIEW OF SYSTEMS: .   Respiratory: Negative for shortness of breath. Cardiovascular: Negative for chest pain, palpitations  Gastrointestinal: Negative for blood in stool, abdominal distention, nausea, vomiting, abdominal pain, diarrhea,constipation. Neurological: Negative for speech difficulty, weakness and light-headedness, dizziness, tremors, sleepiness  Psychiatric/Behavioral: Negative for suicidal ideas, hallucinations, behavioral problems, self-injury, decreased concentration/cognition, agitation, confusion. PHYSICAL EXAM:   Nursing note and vitals reviewed. BP (!) 157/94 (Site: Left Upper Arm, Position: Sitting)   Pulse 76   Ht 6' 2\" (1.88 m)   Wt 208 lb (94.3 kg)   SpO2 98%   BMI 26.71 kg/m²   General Appearance: Patient is well nourished, well developed, well groomed and in no acute distress. Skin: Skin is warm and dry, good turgor .  No rash or lesions noted. He is not diaphoretic. Pulmonary/Chest: Effort normal. No respiratory distress or use of accessory muscles. Auscultation revealing normal air entry. He does not have wheezes in the lung fields. He has no rales. Cardiovascular: Normal rate, regular rhythm, normal heart sounds, and does not have murmur. Exam reveals no gallop and no friction rub. Musculoskeletal / Extremities: Range of motion is normal. Gait is normal, assistive devices use: none. He exhibits edema: none, and no tenderness. Neurological/Psychiatric:He is alert and oriented to person, place, and time. Coordination is  normal.   Judgement and Insight is normal  His mood is Appropriate and affect is Neutral/Euthymic(normal) . His behavior is normal.   thought content normal.   Other: Back: +taunt bands with TP's, decrease ROM      IMPRESSION:     1. Chronic pain syndrome    2. DDD (degenerative disc disease), lumbar    3. Lumbar spondylosis    4. Lumbar radiculitis    5. Postlaminectomy syndrome, lumbar region    6. Fibromyalgia    7. Failed neck syndrome    8. Brachial neuritis or radiculitis        PLAN:  Informed verbal consent was obtained. -ROM/Stretching exercises as advised   -Back stretching exercises as advised   -Continue with Percocet 4 per day  -he was advised proper sleep hygiene-told to avoid:use of caffeine or other stimulants after noon, alcohol use near bedtime, long or frequent naps during the day, erratic sleep schedule, heavy meals near bedtime, vigorous exercise near bedtime and use of electronic devices near bedtime   -Continue with Ambien 10 mg   -MRI/CT myelogram reviewed  -All Rx options was discussed with the patient along with surgical consult and CARMEN  -Danielle exercises given   -Informed verbal consent was obtained from the patient. Risks and benefits of the procedure were explained. Complications of the procedure and side effects of kenalog/ lidocaine were discussed with patient. Using 0.25% marcaine and 1cc of kenalog, the the tender trigger point areas in the  bilateral paraspinal lumbar muscles -erector spinae and longgissmus muscles were injected under aseptic condition. Mobilization attempted by stretching. Patient tolerated procedure well. Adv to apply ice today. Mr. Jack Cross will be prescribed  the medications  listed below which are for treatment of his presenting  medical problems which for this visit include:   Diagnoses of Chronic pain syndrome, DDD (degenerative disc disease), lumbar, Lumbar spondylosis, Lumbar radiculitis, Postlaminectomy syndrome, lumbar region, Fibromyalgia, and Failed neck syndrome were pertinent to this visit. Medications/orders associated with this visit:    Current Outpatient Medications   Medication Sig Dispense Refill    zolpidem (AMBIEN) 10 MG tablet Take 1 tablet by mouth nightly as needed for Sleep for up to 30 days. 30 tablet 0    oxyCODONE-acetaminophen (PERCOCET) 7.5-325 MG per tablet Take 1 tablet by mouth every 6 hours as needed for Pain (max 4 per day) for up to 30 days. 120 tablet 0    diclofenac (VOLTAREN) 75 MG EC tablet Take 1 tablet by mouth 2 times daily as needed for Pain 60 tablet 1    Naloxone HCl (EVZIO) 2 MG/0.4ML SOAJ Use as directed 1 Package 0    ALPRAZolam (XANAX) 0.5 MG tablet Take 0.5 mg by mouth nightly as needed. No current facility-administered medications for this visit. Goals of current treatment regimen include improvement in pain, restoration of functioning- with focus on improvement in physical performance, general activity, work or disability,emotional distress, health care utilization and  decreased medication consumption. Will continue to monitor progress towards achieving/maintaining therapeutic goals with special emphasis on  1. Improvement in perceived interfernce  of pain with ADL's. Ability to do home exercises independently.  Ability to do household chores indoor and/or outdoor work and social and leisure activities. To increase flexibility/ROM, strength and endurance. Improve psychosocial and physical functioning.- he is not showing any significant progress/or showing regression  towards this goal and reassessment and adjustment of goals/treatment have been made. 2. Improving sleep to 6-7 hours a night. Improve mood/ anxiety and depression symptoms such as crying spells, low energy, problems with concentration, motivation.- he is showing progression towards this treatment goal with the current regimen. 3. Reduction of reliance on opioid analgesia/more appropriate opioid use. - he is showing progression towards this treatment goal with the current regimen. 4. Ability to focus/concentrate at work and perform the duties required of him at work  Sit through a workday without lower extremity symptoms. Stand 30-60 minutes without lower extremity symptoms. Ability to lift up to 10-20 lbs. Ability to go up and down stairs. Sit 30-60 minutes  Without having to stand up frequently. - he is maintaining/progressing towards his work related goals with the current regimen. Risks and benefits of the medications and other alternative treatments have been/were  discussed with the patient. Any questions on the  common side effects of these medications were also answered. He was advised against drinking alcohol with the narcotic pain medicines, advised against driving or handling machinery when  starting or adjusting the dose of medicines, feeling groggy or drowsy, or if having any cognitive issues related to the current medications. Heis fully aware of the risk of overdose and death, if medicines are misused and not taken as prescribed. If he develops new symptoms or if the symptoms worsen, he was told to call the office. .  Thank you for allowing me to participate in the care of this patient.     Yojana Mayorga MD    Cc: Vikki Mcmahon MD

## 2022-08-15 ENCOUNTER — OFFICE VISIT (OUTPATIENT)
Dept: PAIN MANAGEMENT | Age: 62
End: 2022-08-15
Payer: COMMERCIAL

## 2022-08-15 VITALS
HEIGHT: 74 IN | BODY MASS INDEX: 26.05 KG/M2 | OXYGEN SATURATION: 96 % | HEART RATE: 84 BPM | DIASTOLIC BLOOD PRESSURE: 91 MMHG | WEIGHT: 203 LBS | SYSTOLIC BLOOD PRESSURE: 155 MMHG

## 2022-08-15 DIAGNOSIS — M51.36 DDD (DEGENERATIVE DISC DISEASE), LUMBAR: ICD-10-CM

## 2022-08-15 DIAGNOSIS — M54.12 BRACHIAL NEURITIS OR RADICULITIS: ICD-10-CM

## 2022-08-15 DIAGNOSIS — M79.7 FIBROMYALGIA: ICD-10-CM

## 2022-08-15 DIAGNOSIS — G89.4 CHRONIC PAIN SYNDROME: ICD-10-CM

## 2022-08-15 DIAGNOSIS — M54.16 LUMBAR RADICULITIS: ICD-10-CM

## 2022-08-15 DIAGNOSIS — M47.816 LUMBAR SPONDYLOSIS: ICD-10-CM

## 2022-08-15 DIAGNOSIS — M96.1 POSTLAMINECTOMY SYNDROME, LUMBAR REGION: ICD-10-CM

## 2022-08-15 DIAGNOSIS — M96.1 FAILED NECK SYNDROME: ICD-10-CM

## 2022-08-15 PROCEDURE — 99213 OFFICE O/P EST LOW 20 MIN: CPT | Performed by: INTERNAL MEDICINE

## 2022-08-15 RX ORDER — DICLOFENAC SODIUM 75 MG/1
75 TABLET, DELAYED RELEASE ORAL 2 TIMES DAILY PRN
Qty: 60 TABLET | Refills: 1 | Status: SHIPPED | OUTPATIENT
Start: 2022-08-15 | End: 2022-09-12 | Stop reason: SDUPTHER

## 2022-08-15 RX ORDER — OXYCODONE AND ACETAMINOPHEN 7.5; 325 MG/1; MG/1
1 TABLET ORAL EVERY 6 HOURS PRN
Qty: 120 TABLET | Refills: 0 | Status: SHIPPED | OUTPATIENT
Start: 2022-08-15 | End: 2022-09-12 | Stop reason: SDUPTHER

## 2022-08-15 RX ORDER — ZOLPIDEM TARTRATE 10 MG/1
10 TABLET ORAL NIGHTLY PRN
Qty: 30 TABLET | Refills: 1 | Status: SHIPPED | OUTPATIENT
Start: 2022-08-15 | End: 2022-09-12 | Stop reason: SDUPTHER

## 2022-08-15 NOTE — PROGRESS NOTES
Canelo Aguilar  1960  [id number]      HISTORY OF PRESENT ILLNESS:  Mr. Georgina Nance is a 65 y.o. male returns for a follow up visit for pain management  He has a diagnosis of   1. Chronic pain syndrome    2. DDD (degenerative disc disease), lumbar    3. Lumbar spondylosis    4. Lumbar radiculitis    5. Postlaminectomy syndrome, lumbar region    6. Fibromyalgia    7. Failed neck syndrome    8. Brachial neuritis or radiculitis    . He complains of pain in the  head, neck, upper back, lower back, right knee with radiation to the left hand, right thigh, right upper leg, right lower leg, right ankle  He rates the pain 5/10 and describes it as sharp, aching, burning, pins and needles. Current treatment regimen has helped relieve about 50% of the pain. He denies any side effects from the current pain regimen. Patient reports that since the last follow up visit the physical functioning is unchanged, family/social relationships are unchanged, mood is unchanged sleep patterns are unchanged, and that the overall functioning is unchanged. Patient denies misusing/abusing his narcotic pain medications or using any illegal drugs. There are No indicators for possible drug abuse, addiction or diversion problems. Patient states the injection did help a lot and is feeling better. He says he is using Voltaren along with Percocet for btp. He reports he is using Ambien also, which is helping with insomnia. ALLERGIES: Patients list of allergies were reviewed     MEDICATIONS: Mr. Georgina Nance list of medications were reviewed. His current medications are   Outpatient Medications Prior to Visit   Medication Sig Dispense Refill    zolpidem (AMBIEN) 10 MG tablet Take 1 tablet by mouth nightly as needed for Sleep for up to 30 days. 30 tablet 0    oxyCODONE-acetaminophen (PERCOCET) 7.5-325 MG per tablet Take 1 tablet by mouth every 6 hours as needed for Pain (max 4 per day) for up to 30 days.  120 tablet 0    diclofenac (VOLTAREN) 75 MG EC tablet Take 1 tablet by mouth 2 times daily as needed for Pain 60 tablet 1    Naloxone HCl (EVZIO) 2 MG/0.4ML SOAJ Use as directed 1 Package 0    ALPRAZolam (XANAX) 0.5 MG tablet Take 0.5 mg by mouth nightly as needed. No facility-administered medications prior to visit. REVIEW OF SYSTEMS:    Respiratory: Negative for apnea, chest tightness and shortness of breath or change in baseline breathing. PHYSICAL EXAM:   Nursing note and vitals reviewed. BP (!) 155/91 (Site: Left Upper Arm, Position: Sitting)   Pulse 84   Ht 6' 2\" (1.88 m)   Wt 203 lb (92.1 kg)   SpO2 96%   BMI 26.06 kg/m²   Constitutional: He appears well-developed and well-nourished. No acute distress. Cardiovascular: Normal rate, regular rhythm, normal heart sounds, and does not have murmur. Pulmonary/Chest: Effort normal. No respiratory distress. He does not have wheezes in the lung fields. He has no rales. Neurological/Psychiatric:He is alert and oriented to person, place, and time. Coordination is  normal.  His mood isAppropriate and affect is Neutral/Euthymic(normal) . IMPRESSION:   1. Chronic pain syndrome    2. DDD (degenerative disc disease), lumbar    3. Lumbar spondylosis    4. Lumbar radiculitis    5. Postlaminectomy syndrome, lumbar region    6. Fibromyalgia    7. Failed neck syndrome    8. Brachial neuritis or radiculitis        PLAN:  Informed verbal consent was obtained  -ROM/Stretching exercises as advised   -Continue with Percocet 4 per day   -He was advised to increase fluids ( 5-7  glasses of fluid daily), limit caffeine, avoid cheese products, increase dietary fiber, increase activity and exercise as tolerated and relax regularly and enjoy meals   -Walking/Swimming exercises as advised    Current Outpatient Medications   Medication Sig Dispense Refill    zolpidem (AMBIEN) 10 MG tablet Take 1 tablet by mouth nightly as needed for Sleep for up to 30 days.  30 tablet 0 oxyCODONE-acetaminophen (PERCOCET) 7.5-325 MG per tablet Take 1 tablet by mouth every 6 hours as needed for Pain (max 4 per day) for up to 30 days. 120 tablet 0    diclofenac (VOLTAREN) 75 MG EC tablet Take 1 tablet by mouth 2 times daily as needed for Pain 60 tablet 1    Naloxone HCl (EVZIO) 2 MG/0.4ML SOAJ Use as directed 1 Package 0    ALPRAZolam (XANAX) 0.5 MG tablet Take 0.5 mg by mouth nightly as needed. No current facility-administered medications for this visit. I will continue his current medication regimen  which is part of the above treatment schedule. It has been helping with Mr. Audrey Cervantes chronic  medical problems which for this visit include:   Diagnoses of Chronic pain syndrome, DDD (degenerative disc disease), lumbar, Lumbar spondylosis, Lumbar radiculitis, Postlaminectomy syndrome, lumbar region, Fibromyalgia, Failed neck syndrome, and Brachial neuritis or radiculitis were pertinent to this visit. Risks and benefits of the medications and other alternative treatments  including no treatment were discussed with the patient. The common side effects of these medications were also explained to the patient. Informed verbal consent was obtained. Goals of current treatment regimen include improvement in pain, restoration of functioning- with focus on improvement in physical performance, general activity, work or disability,emotional distress, health care utilization and  decreased medication consumption. Will continue to monitor progress towards achieving/maintaining therapeutic goals with special emphasis on  1. Improvement in perceived interfernce  of pain with ADL's. Ability to do home exercises independently. Ability to do household chores indoor and/or outdoor work and social and leisure activities. Improve psychosocial and physical functioning. - he is showing progression towards this treatment goal with the current regimen.      He was advised against drinking alcohol with the narcotic pain medicines, advised against driving or handling machinery while adjusting the dose of medicines or if having cognitive  issues related to the current medications. Risk of overdose and death, if medicines not taken as prescribed, were also discussed. If the patient develops new symptoms or if the symptoms worsen, the patient should call the office. While transcribing every attempt was made to maintain the accuracy of the note in terms of it's contents,there may have been some errors made inadvertently. Thank you for allowing me to participate in the care of this patient.     Ranjana Alvarez MD.    Cc: Anu Kilpatrick MD

## 2022-09-10 DIAGNOSIS — G89.4 CHRONIC PAIN SYNDROME: ICD-10-CM

## 2022-09-12 ENCOUNTER — OFFICE VISIT (OUTPATIENT)
Dept: PAIN MANAGEMENT | Age: 62
End: 2022-09-12
Payer: COMMERCIAL

## 2022-09-12 VITALS
HEART RATE: 82 BPM | SYSTOLIC BLOOD PRESSURE: 144 MMHG | WEIGHT: 206.6 LBS | DIASTOLIC BLOOD PRESSURE: 92 MMHG | BODY MASS INDEX: 26.53 KG/M2

## 2022-09-12 DIAGNOSIS — M96.1 FAILED NECK SYNDROME: ICD-10-CM

## 2022-09-12 DIAGNOSIS — G89.4 CHRONIC PAIN SYNDROME: ICD-10-CM

## 2022-09-12 DIAGNOSIS — M54.12 BRACHIAL NEURITIS OR RADICULITIS: ICD-10-CM

## 2022-09-12 DIAGNOSIS — M96.1 POSTLAMINECTOMY SYNDROME, LUMBAR REGION: ICD-10-CM

## 2022-09-12 DIAGNOSIS — M51.36 DDD (DEGENERATIVE DISC DISEASE), LUMBAR: ICD-10-CM

## 2022-09-12 DIAGNOSIS — M54.16 LUMBAR RADICULITIS: ICD-10-CM

## 2022-09-12 DIAGNOSIS — M47.816 LUMBAR SPONDYLOSIS: ICD-10-CM

## 2022-09-12 DIAGNOSIS — M79.7 FIBROMYALGIA: ICD-10-CM

## 2022-09-12 PROCEDURE — 20553 NJX 1/MLT TRIGGER POINTS 3/>: CPT | Performed by: INTERNAL MEDICINE

## 2022-09-12 PROCEDURE — 99213 OFFICE O/P EST LOW 20 MIN: CPT | Performed by: INTERNAL MEDICINE

## 2022-09-12 RX ORDER — OXYCODONE AND ACETAMINOPHEN 7.5; 325 MG/1; MG/1
1 TABLET ORAL EVERY 6 HOURS PRN
Qty: 120 TABLET | Refills: 0 | Status: SHIPPED | OUTPATIENT
Start: 2022-09-12 | End: 2022-10-10 | Stop reason: SDUPTHER

## 2022-09-12 RX ORDER — TRIAMCINOLONE ACETONIDE 40 MG/ML
40 INJECTION, SUSPENSION INTRA-ARTICULAR; INTRAMUSCULAR ONCE
Status: COMPLETED | OUTPATIENT
Start: 2022-09-12 | End: 2022-09-12

## 2022-09-12 RX ORDER — ZOLPIDEM TARTRATE 10 MG/1
10 TABLET ORAL NIGHTLY PRN
Qty: 30 TABLET | Refills: 1 | Status: SHIPPED | OUTPATIENT
Start: 2022-09-12 | End: 2022-10-10 | Stop reason: SDUPTHER

## 2022-09-12 RX ORDER — DICLOFENAC SODIUM 75 MG/1
75 TABLET, DELAYED RELEASE ORAL 2 TIMES DAILY PRN
Qty: 60 TABLET | Refills: 1 | Status: SHIPPED | OUTPATIENT
Start: 2022-09-12

## 2022-09-12 RX ADMIN — TRIAMCINOLONE ACETONIDE 40 MG: 40 INJECTION, SUSPENSION INTRA-ARTICULAR; INTRAMUSCULAR at 10:42

## 2022-09-12 NOTE — PROGRESS NOTES
Champ Webb  1960  [id number]      HISTORY OF PRESENT ILLNESS:  Mr. Fede Bai is a 65 y.o. male returns for a follow up visit for pain management  He has a diagnosis of   1. Chronic pain syndrome    2. Lumbar radiculitis    3. Failed neck syndrome    4. Primary insomnia    5. DDD (degenerative disc disease), lumbar    6. Postlaminectomy syndrome, lumbar region    7. Brachial neuritis or radiculitis    8. Lumbar spondylosis    9. Fibromyalgia    10. Mood disorder ([facility] [address]    . He complains of pain in the  Neck, upper and lower back, right hand, right leg   He rates the pain 5/10 and describes it as sharp, aching, burning. Current treatment regimen has helped relieve about 50% of the pain. He denies any side effects from the current pain regimen. Patient reports that since the last follow up visit the physical functioning is unchanged, family/social relationships are unchanged, mood is unchanged sleep patterns are unchanged, and that the overall functioning is unchanged. Patient denies misusing/abusing his narcotic pain medications or using any illegal drugs. There are No indicators for possible drug abuse, addiction or diversion problems, patient states he has been hurting more, he wants to get an injection in the back. Mr. Fede Bai states he has been working full time still, he has to do a lot of lifting and bending. Patient denies any constipation symptoms. He mentions he is using Percocet 4 per day. ALLERGIES: Patients list of allergies were reviewed     MEDICATIONS: Mr. Fede Bai list of medications were reviewed. His current medications are   Outpatient Medications Prior to Visit   Medication Sig Dispense Refill    Naloxone HCl (EVZIO) 2 MG/0.4ML SOAJ Use as directed 1 Package 0    ALPRAZolam (XANAX) 0.5 MG tablet Take 0.5 mg by mouth nightly as needed. zolpidem (AMBIEN) 10 MG tablet Take 1 tablet by mouth nightly as needed for Sleep for up to 30 days.  30 tablet 1 oxyCODONE-acetaminophen (PERCOCET) 7.5-325 MG per tablet Take 1 tablet by mouth every 6 hours as needed for Pain (max 4 per day) for up to 30 days. 120 tablet 0    diclofenac (VOLTAREN) 75 MG EC tablet Take 1 tablet by mouth 2 times daily as needed for Pain 60 tablet 1     No facility-administered medications prior to visit. REVIEW OF SYSTEMS:    Respiratory: Negative for apnea, chest tightness and shortness of breath or change in baseline breathing. PHYSICAL EXAM:   Nursing note and vitals reviewed. BP (!) 144/92   Pulse 82   Wt 206 lb 9.6 oz (93.7 kg)   BMI 26.53 kg/m²   Constitutional: He appears well-developed and well-nourished. No acute distress. Cardiovascular: Normal rate, regular rhythm, normal heart sounds, and does not have murmur. Pulmonary/Chest: Effort normal. No respiratory distress. He does not have wheezes in the lung fields. He has no rales. Neurological/Psychiatric:He is alert and oriented to person, place, and time. Coordination is  normal.  His mood isAppropriate and affect is Neutral/Euthymic(normal) . Other: Back, + taut band with TP's     IMPRESSION:   1. Chronic pain syndrome    2. Lumbar radiculitis    3. Failed neck syndrome    4. DDD (degenerative disc disease), lumbar    5. Postlaminectomy syndrome, lumbar region    6. Brachial neuritis or radiculitis    7. Lumbar spondylosis    8. Fibromyalgia        PLAN:  Informed verbal consent was obtained  -ROM/Stretching exercises as advised   -He was advised to increase fluids ( 5-7  glasses of fluid daily), limit caffeine, avoid cheese products, increase dietary fiber, increase activity and exercise as tolerated and relax regularly and enjoy meals   -Continue with Percocet 4 per day   -Continue with all adjuvants as before   -Informed verbal consent was obtained from the patient. Risks and benefits of the procedure were explained.  Complications of the procedure and side effects of kenalog/ lidocaine were discussed with patient. Using 0.25% marcaine and 1cc of kenalog, the the tender trigger point areas in the  bilateral paraspinal lumbar muscles -erector spinae and longgissmus muscles were injected under aseptic condition. Mobilization attempted by stretching. Patient tolerated procedure well. Adv to apply ice today. Current Outpatient Medications   Medication Sig Dispense Refill    zolpidem (AMBIEN) 10 MG tablet Take 1 tablet by mouth nightly as needed for Sleep for up to 30 days. 30 tablet 1    oxyCODONE-acetaminophen (PERCOCET) 7.5-325 MG per tablet Take 1 tablet by mouth every 6 hours as needed for Pain (max 4 per day) for up to 30 days. 120 tablet 0    diclofenac (VOLTAREN) 75 MG EC tablet Take 1 tablet by mouth 2 times daily as needed for Pain 60 tablet 1    Naloxone HCl (EVZIO) 2 MG/0.4ML SOAJ Use as directed 1 Package 0    ALPRAZolam (XANAX) 0.5 MG tablet Take 0.5 mg by mouth nightly as needed. Current Facility-Administered Medications   Medication Dose Route Frequency Provider Last Rate Last Admin    triamcinolone acetonide (KENALOG-40) injection 40 mg  40 mg IntraMUSCular Once Roberto Vernon MD         I will continue his current medication regimen  which is part of the above treatment schedule. It has been helping with Mr. Iliana Loving chronic  medical problems which for this visit include:   Diagnoses of Chronic pain syndrome, Lumbar radiculitis, Failed neck syndrome, Primary insomnia, DDD (degenerative disc disease), lumbar, Postlaminectomy syndrome, lumbar region, Brachial neuritis or radiculitis, Lumbar spondylosis, Fibromyalgia, and Mood disorder ([facility] [address] were pertinent to this visit. Risks and benefits of the medications and other alternative treatments  including no treatment were discussed with the patient. The common side effects of these medications were also explained to the patient. Informed verbal consent was obtained.    Goals of current treatment regimen include improvement in pain, restoration of functioning- with focus on improvement in physical performance, general activity, work or disability,emotional distress, health care utilization and  decreased medication consumption. Will continue to monitor progress towards achieving/maintaining therapeutic goals with special emphasis on  1. Improvement in perceived interfernce  of pain with ADL's. Ability to do home exercises independently. Ability to do household chores indoor and/or outdoor work and social and leisure activities. Improve psychosocial and physical functioning. - he is showing progression towards this treatment goal with the current regimen. He was advised against drinking alcohol with the narcotic pain medicines, advised against driving or handling machinery while adjusting the dose of medicines or if having cognitive  issues related to the current medications. Risk of overdose and death, if medicines not taken as prescribed, were also discussed. If the patient develops new symptoms or if the symptoms worsen, the patient should call the office. While transcribing every attempt was made to maintain the accuracy of the note in terms of it's contents,there may have been some errors made inadvertently. Thank you for allowing me to participate in the care of this patient.     Bonnee Siemens, MD.    Cc: Vinnie Mata MD

## 2022-09-30 RX ORDER — ZOLPIDEM TARTRATE 10 MG/1
10 TABLET ORAL NIGHTLY PRN
Qty: 30 TABLET | Refills: 0 | OUTPATIENT
Start: 2022-09-30 | End: 2022-10-30

## 2022-10-10 ENCOUNTER — OFFICE VISIT (OUTPATIENT)
Dept: PAIN MANAGEMENT | Age: 62
End: 2022-10-10
Payer: COMMERCIAL

## 2022-10-10 VITALS
WEIGHT: 209 LBS | DIASTOLIC BLOOD PRESSURE: 95 MMHG | BODY MASS INDEX: 26.83 KG/M2 | SYSTOLIC BLOOD PRESSURE: 156 MMHG | HEART RATE: 84 BPM | OXYGEN SATURATION: 95 %

## 2022-10-10 DIAGNOSIS — M96.1 POSTLAMINECTOMY SYNDROME, LUMBAR REGION: ICD-10-CM

## 2022-10-10 DIAGNOSIS — G89.4 CHRONIC PAIN SYNDROME: ICD-10-CM

## 2022-10-10 DIAGNOSIS — M51.36 DDD (DEGENERATIVE DISC DISEASE), LUMBAR: ICD-10-CM

## 2022-10-10 DIAGNOSIS — M47.816 LUMBAR SPONDYLOSIS: ICD-10-CM

## 2022-10-10 DIAGNOSIS — M54.16 LUMBAR RADICULITIS: ICD-10-CM

## 2022-10-10 DIAGNOSIS — M96.1 FAILED NECK SYNDROME: ICD-10-CM

## 2022-10-10 DIAGNOSIS — M54.12 BRACHIAL NEURITIS OR RADICULITIS: ICD-10-CM

## 2022-10-10 DIAGNOSIS — M79.7 FIBROMYALGIA: ICD-10-CM

## 2022-10-10 DIAGNOSIS — F51.01 PRIMARY INSOMNIA: ICD-10-CM

## 2022-10-10 PROCEDURE — 99213 OFFICE O/P EST LOW 20 MIN: CPT | Performed by: INTERNAL MEDICINE

## 2022-10-10 RX ORDER — DICLOFENAC SODIUM 75 MG/1
75 TABLET, DELAYED RELEASE ORAL 2 TIMES DAILY PRN
Qty: 60 TABLET | Refills: 1 | Status: CANCELLED | OUTPATIENT
Start: 2022-10-10

## 2022-10-10 RX ORDER — OXYCODONE AND ACETAMINOPHEN 7.5; 325 MG/1; MG/1
1 TABLET ORAL EVERY 6 HOURS PRN
Qty: 120 TABLET | Refills: 0 | Status: SHIPPED | OUTPATIENT
Start: 2022-10-10 | End: 2022-11-09

## 2022-10-10 RX ORDER — ZOLPIDEM TARTRATE 10 MG/1
10 TABLET ORAL NIGHTLY PRN
Qty: 30 TABLET | Refills: 1 | Status: SHIPPED | OUTPATIENT
Start: 2022-10-10 | End: 2022-11-09

## 2022-10-10 NOTE — LETTER
THUY INJECTION ORDER    Date:  10/10/22      Patient: Yuan Erickson     YOB: 1960    Patient Home Phone: [phone] (home)    Diagnosis:M54.16,M51.36,M96.1,M47.816,G89.4    Levels: L4-L5, L5-S1    Side: Please check  []LT     [x]RT     []ONI     []Midline    Location of Injection: Please check  []Cervical IL Thuy   [x]Lumbar Transforaminal THUY  []Cervical TF Tuhy              []Lumbar Interlaminar THUY  []Cervical Facet Injection  [] Lumbar Facet Injection    []Cervical Medial Branch Block []Lumbar Medial Branch Block  []Other:                                              []Lumbar Interlaminar THUY                                                 [] SI Injection       Service Provider or Service Location: Please check  [][facility]/Dr. Hero Hess    [x][facility]  []Dr. Sloane Milliagn    []Dr. Óscar Cat   []Other:     Ordering Physician: Soniya Pena MD  Signature: Electronically signed by Soniya Pena MD    Comments or Special Instructions: Allergies:    Allergies   Allergen Reactions    Penicillins     Morphine Nausea And Vomiting       First Insurance:  Payor: Patricia Marcial / Plan: [employer] POS OPEN ACCESS  / Product Type: *No Product type* /                                ______________________________________________________________________  To be filled in by Scheduling:  Scheduled Date:    Scheduled Time:  Procedure Code:  Pre Cert Information:

## 2022-10-10 NOTE — PROGRESS NOTES
María Elena Larsonna  1960  [id number]      HISTORY OF PRESENT ILLNESS:  Mr. Virginie Zelaya is a 65 y.o. male returns for a follow up visit for pain management  He has a diagnosis of   1. Chronic pain syndrome    2. Lumbar radiculitis    3. Failed neck syndrome    4. DDD (degenerative disc disease), lumbar    5. Postlaminectomy syndrome, lumbar region    6. Brachial neuritis or radiculitis    7. Lumbar spondylosis    8. Fibromyalgia    9. Primary insomnia    . He complains of pain in the  Neck, Upper back, Low back, left hands, left leg, and left foot   He rates the pain 6/10 and describes it as sharp, aching, burning. Current treatment regimen has helped relieve about 40% of the pain. He denies any side effects from the current pain regimen. Patient reports that since the last follow up visit the physical functioning is worse, family/social relationships are unchanged, mood is unchanged sleep patterns are unchanged, and that the overall functioning is worse. Patient denies misusing/abusing his narcotic pain medications or using any illegal drugs. There are No indicators for possible drug abuse, addiction or diversion problems, There are No indicators for possible drug abuse, addiction or diversion problems. Upon obtaining the medical history from Mr. Virginie Zelaya regarding today's office visit for his presenting problems, patient states he has been doing fair, his pain has been somewhat manageable with the medications. Mr. Virginie Zelaya mentions he is using Percocet along with Voltaren . He states he has a lot of pain on the right side of back, buttock and right leg and foot. Patient reports he is working full time still, he states he has been travelling locally. Patient states his sleep is fair. Has fairly normal sleep latency. Averages about 4-6 hours of sleep a night. Denies any signs of sleep apnea. Feels somewhat rested in the morning.       ALLERGIES/PAST MED/FAM/SOC HISTORY: Mr. Virginie Zelaya allergies, past medical, family and social history were reviewed in the chart. Mr. Margarita Yusuf current medications are   Outpatient Medications Prior to Visit   Medication Sig Dispense Refill    diclofenac (VOLTAREN) 75 MG EC tablet Take 1 tablet by mouth 2 times daily as needed for Pain 60 tablet 1    Naloxone HCl (EVZIO) 2 MG/0.4ML SOAJ Use as directed 1 Package 0    ALPRAZolam (XANAX) 0.5 MG tablet Take 0.5 mg by mouth nightly as needed. zolpidem (AMBIEN) 10 MG tablet Take 1 tablet by mouth nightly as needed for Sleep for up to 30 days. 30 tablet 1    oxyCODONE-acetaminophen (PERCOCET) 7.5-325 MG per tablet Take 1 tablet by mouth every 6 hours as needed for Pain (max 4 per day) for up to 30 days. 120 tablet 0     No facility-administered medications prior to visit. REVIEW OF SYSTEMS:     Respiratory: Negative for shortness of breath. Cardiovascular: Negative for chest pain, palpitations  Gastrointestinal: Negative for blood in stool, abdominal distention, nausea, vomiting, abdominal pain, diarrhea,constipation. Neurological: Negative for speech difficulty, weakness and light-headedness, dizziness, tremors, sleepiness  Psychiatric/Behavioral: Negative for suicidal ideas, hallucinations, behavioral problems, self-injury, decreased concentration/cognition, agitation, confusion. PHYSICAL EXAM:   Nursing note and vitals reviewed. BP (!) 156/95 (Site: Left Upper Arm, Position: Sitting)   Pulse 84   Wt 209 lb (94.8 kg)   SpO2 95%   BMI 26.83 kg/m²   General Appearance: Patient is well nourished, well developed, well groomed and in no acute distress. Skin: Skin is warm and dry, good turgor . No rash or lesions noted. He is not diaphoretic. Pulmonary/Chest: Effort normal. No respiratory distress or use of accessory muscles. Auscultation revealing normal air entry. He does not have wheezes in the lung fields. He has no rales.    Cardiovascular: Normal rate, regular rhythm, normal heart sounds, and does not have murmur. Exam reveals no gallop and no friction rub. Musculoskeletal / Extremities: Range of motion is normal. Gait is normal, assistive devices use: none. He exhibits edema: none, and no tenderness. Neurological/Psychiatric:He is alert and oriented to person, place, and time. Coordination is  normal.   Judgement and Insight is normal  His mood is Appropriate and affect is Neutral/Euthymic(normal) . His behavior is normal.   thought content normal.        IMPRESSION:     1. Chronic pain syndrome    2. Lumbar radiculitis    3. Failed neck syndrome    4. DDD (degenerative disc disease), lumbar    5. Postlaminectomy syndrome, lumbar region    6. Brachial neuritis or radiculitis    7. Lumbar spondylosis    8. Fibromyalgia    9. Primary insomnia        PLAN:  Informed verbal consent was obtained. -[place] was obtained and reviewed  for the last one year and no indicators of drug misuse  were found. Any other controlled substance prescriptions  seen on the record have been accounted for, I am aware of the patient receiving these medications. Kassy Mayoing [place] record will be rechecked as part of office protocol.     -ROM/Stretching exercises as advised   -MRI lumbar spine and myelogram reviewed  -Will get CARMEN right 4-5 per day/L5-S1  -he was advised proper sleep hygiene-told to avoid:use of caffeine or other stimulants after noon, alcohol use near bedtime, long or frequent naps during the day, erratic sleep schedule, heavy meals near bedtime, vigorous exercise near bedtime and use of electronic devices near bedtime   -Continue with Ambien   -Advised caffeine reduction, dietary changes, elevate head end of bed, NPO after supper, if using alcohol advised reduction of alcohol intake, tobacco cessation if smoking, weight loss   -Continue with Voltaren 75 mg daily   -Danielle exercises as advised   -All Rx options including surgery discussed with patient  Mr. Prateek Benitez will be prescribed  the medications  listed below which are for treatment of his presenting  medical problems which for this visit include:   Diagnoses of Chronic pain syndrome, Lumbar radiculitis, Failed neck syndrome, DDD (degenerative disc disease), lumbar, Postlaminectomy syndrome, lumbar region, Brachial neuritis or radiculitis, Lumbar spondylosis, Fibromyalgia, and Primary insomnia were pertinent to this visit. Medications/orders associated with this visit:    Current Outpatient Medications   Medication Sig Dispense Refill    zolpidem (AMBIEN) 10 MG tablet Take 1 tablet by mouth nightly as needed for Sleep for up to 30 days. 30 tablet 1    oxyCODONE-acetaminophen (PERCOCET) 7.5-325 MG per tablet Take 1 tablet by mouth every 6 hours as needed for Pain (max 4 per day) for up to 30 days. 120 tablet 0    diclofenac (VOLTAREN) 75 MG EC tablet Take 1 tablet by mouth 2 times daily as needed for Pain 60 tablet 1    Naloxone HCl (EVZIO) 2 MG/0.4ML SOAJ Use as directed 1 Package 0    ALPRAZolam (XANAX) 0.5 MG tablet Take 0.5 mg by mouth nightly as needed. No current facility-administered medications for this visit. Goals of current treatment regimen include improvement in pain, restoration of functioning- with focus on improvement in physical performance, general activity, work or disability,emotional distress, health care utilization and  decreased medication consumption. Will continue to monitor progress towards achieving/maintaining therapeutic goals with special emphasis on  1. Improvement in perceived interfernce  of pain with ADL's. Ability to do home exercises independently. Ability to do household chores indoor and/or outdoor work and social and leisure activities. To increase flexibility/ROM, strength and endurance. Improve psychosocial and physical functioning.- he is not showing any significant progress/or showing regression  towards this goal and reassessment and adjustment of goals/treatment have been made.    2. Improving sleep to 6-7 hours a night. Improve mood/ anxiety and depression symptoms such as crying spells, low energy, problems with concentration, motivation.- he is showing progression towards this treatment goal with the current regimen. 3. Reduction of reliance on opioid analgesia/more appropriate opioid use. - he is showing progression towards this treatment goal with the current regimen. 4. Ability to focus/concentrate at work and perform the duties required of him at work  Sit through a workday without lower extremity symptoms. Stand 30-60 minutes without lower extremity symptoms. Ability to lift up to 10-20 lbs. Ability to go up and down stairs. Sit 30-60 minutes  Without having to stand up frequently. - he is maintaining/progressing towards his work related goals with the current regimen. Risks and benefits of the medications and other alternative treatments have been/were  discussed with the patient. Any questions on the  common side effects of these medications were also answered. He was advised against drinking alcohol with the narcotic pain medicines, advised against driving or handling machinery when  starting or adjusting the dose of medicines, feeling groggy or drowsy, or if having any cognitive issues related to the current medications. Heis fully aware of the risk of overdose and death, if medicines are misused and not taken as prescribed. If he develops new symptoms or if the symptoms worsen, he was told to call the office. .  Thank you for allowing me to participate in the care of this patient.     Ryan Rose MD.    Cc: Sven Elizondo MD

## 2022-11-07 ENCOUNTER — OFFICE VISIT (OUTPATIENT)
Dept: PAIN MANAGEMENT | Age: 62
End: 2022-11-07
Payer: COMMERCIAL

## 2022-11-07 VITALS
WEIGHT: 208 LBS | HEART RATE: 81 BPM | DIASTOLIC BLOOD PRESSURE: 83 MMHG | BODY MASS INDEX: 26.71 KG/M2 | SYSTOLIC BLOOD PRESSURE: 137 MMHG

## 2022-11-07 DIAGNOSIS — M54.12 BRACHIAL NEURITIS OR RADICULITIS: ICD-10-CM

## 2022-11-07 DIAGNOSIS — M96.1 POSTLAMINECTOMY SYNDROME, LUMBAR REGION: ICD-10-CM

## 2022-11-07 DIAGNOSIS — G89.4 CHRONIC PAIN SYNDROME: ICD-10-CM

## 2022-11-07 DIAGNOSIS — Z51.81 ENCOUNTER FOR THERAPEUTIC DRUG MONITORING: ICD-10-CM

## 2022-11-07 DIAGNOSIS — M96.1 FAILED NECK SYNDROME: ICD-10-CM

## 2022-11-07 DIAGNOSIS — M47.816 LUMBAR SPONDYLOSIS: ICD-10-CM

## 2022-11-07 DIAGNOSIS — M79.7 FIBROMYALGIA: ICD-10-CM

## 2022-11-07 DIAGNOSIS — M54.16 LUMBAR RADICULITIS: ICD-10-CM

## 2022-11-07 DIAGNOSIS — M51.36 DDD (DEGENERATIVE DISC DISEASE), LUMBAR: ICD-10-CM

## 2022-11-07 PROCEDURE — 20553 NJX 1/MLT TRIGGER POINTS 3/>: CPT | Performed by: INTERNAL MEDICINE

## 2022-11-07 PROCEDURE — 99213 OFFICE O/P EST LOW 20 MIN: CPT | Performed by: INTERNAL MEDICINE

## 2022-11-07 RX ORDER — DICLOFENAC SODIUM 75 MG/1
75 TABLET, DELAYED RELEASE ORAL 2 TIMES DAILY PRN
Qty: 60 TABLET | Refills: 1 | Status: SHIPPED | OUTPATIENT
Start: 2022-11-07

## 2022-11-07 RX ORDER — TRIAMCINOLONE ACETONIDE 40 MG/ML
40 INJECTION, SUSPENSION INTRA-ARTICULAR; INTRAMUSCULAR ONCE
Status: COMPLETED | OUTPATIENT
Start: 2022-11-07 | End: 2022-11-07

## 2022-11-07 RX ORDER — OXYCODONE AND ACETAMINOPHEN 7.5; 325 MG/1; MG/1
1 TABLET ORAL EVERY 6 HOURS PRN
Qty: 120 TABLET | Refills: 0 | Status: SHIPPED | OUTPATIENT
Start: 2022-11-07 | End: 2022-12-07

## 2022-11-07 RX ORDER — ZOLPIDEM TARTRATE 10 MG/1
10 TABLET ORAL NIGHTLY PRN
Qty: 30 TABLET | Refills: 1 | Status: SHIPPED | OUTPATIENT
Start: 2022-11-07 | End: 2022-12-07

## 2022-11-07 RX ADMIN — TRIAMCINOLONE ACETONIDE 40 MG: 40 INJECTION, SUSPENSION INTRA-ARTICULAR; INTRAMUSCULAR at 12:32

## 2022-11-07 NOTE — PROGRESS NOTES
Valentin   1960  [id number]      HISTORY OF PRESENT ILLNESS:  Mr. Meet Tao is a 65 y.o. male returns for a follow up visit for pain management  He has a diagnosis of   1. Chronic pain syndrome    2. Lumbar radiculitis    3. Failed neck syndrome    4. DDD (degenerative disc disease), lumbar    5. Postlaminectomy syndrome, lumbar region    6. Brachial neuritis or radiculitis    7. Lumbar spondylosis    8. Fibromyalgia    . He complains of pain in the  Neck, Low back, right leg   He rates the pain 6/10 and describes it as sharp, aching, burning, numbness, pins and needles. Current treatment regimen has helped relieve about 40% of the pain. He denies any side effects from the current pain regimen. Patient reports that since the last follow up visit the physical functioning is unchanged, family/social relationships are unchanged, mood is unchanged sleep patterns are unchanged, and that the overall functioning is unchanged. Patient denies misusing/abusing his narcotic pain medications or using any illegal drugs. There are No indicators for possible drug abuse, addiction or diversion problems. Patient complains he is having numbness in the right leg and pain the back. He says he is using Voltaren along with Percocet, which is helping with the pain. He denies any side effects with the medications. He reports he wants to wait on the CARMEN, but wants a TPI. ALLERGIES: Patients list of allergies were reviewed     MEDICATIONS: Mr. Meet Tao list of medications were reviewed. His current medications are   Outpatient Medications Prior to Visit   Medication Sig Dispense Refill    zolpidem (AMBIEN) 10 MG tablet Take 1 tablet by mouth nightly as needed for Sleep for up to 30 days. 30 tablet 1    oxyCODONE-acetaminophen (PERCOCET) 7.5-325 MG per tablet Take 1 tablet by mouth every 6 hours as needed for Pain (max 4 per day) for up to 30 days.  120 tablet 0    diclofenac (VOLTAREN) 75 MG EC tablet Take 1 tablet by mouth 2 times daily as needed for Pain 60 tablet 1    Naloxone HCl (EVZIO) 2 MG/0.4ML SOAJ Use as directed 1 Package 0    ALPRAZolam (XANAX) 0.5 MG tablet Take 0.5 mg by mouth nightly as needed. No facility-administered medications prior to visit. REVIEW OF SYSTEMS:    Respiratory: Negative for apnea, chest tightness and shortness of breath or change in baseline breathing. PHYSICAL EXAM:   Nursing note and vitals reviewed. /83   Pulse 81   Wt 208 lb (94.3 kg)   BMI 26.71 kg/m²   Constitutional: He appears well-developed and well-nourished. No acute distress. Cardiovascular: Normal rate, regular rhythm, normal heart sounds, and does not have murmur. Pulmonary/Chest: Effort normal. No respiratory distress. He does not have wheezes in the lung fields. He has no rales. Neurological/Psychiatric:He is alert and oriented to person, place, and time. Coordination is  normal.  His mood isAppropriate and affect is Neutral/Euthymic(normal) . Other: Back + taut bands, with TP's, decrease ROM     IMPRESSION:   1. Chronic pain syndrome    2. Lumbar radiculitis    3. Failed neck syndrome    4. DDD (degenerative disc disease), lumbar    5. Postlaminectomy syndrome, lumbar region    6. Brachial neuritis or radiculitis    7. Lumbar spondylosis    8. Fibromyalgia        PLAN:  Informed verbal consent was obtained  -Informed verbal consent was obtained from the patient. Risks and benefits of the procedure were explained. Complications of the procedure and side effects of kenalog/ lidocaine were discussed with patient. Using 0.25% marcaine and 1cc of kenalog, the the tender trigger point areas in the  bilateral paraspinal lumbar muscles -erector spinae and longgissmus muscles were injected under aseptic condition. Mobilization attempted by stretching. Patient tolerated procedure well.  Adv to apply ice today.   -Continue with Percocet 4 per day along with Voltaren   -CT myelogram reviewed, CARMEN discuss with patient. Current Outpatient Medications   Medication Sig Dispense Refill    zolpidem (AMBIEN) 10 MG tablet Take 1 tablet by mouth nightly as needed for Sleep for up to 30 days. 30 tablet 1    oxyCODONE-acetaminophen (PERCOCET) 7.5-325 MG per tablet Take 1 tablet by mouth every 6 hours as needed for Pain (max 4 per day) for up to 30 days. 120 tablet 0    diclofenac (VOLTAREN) 75 MG EC tablet Take 1 tablet by mouth 2 times daily as needed for Pain 60 tablet 1    Naloxone HCl (EVZIO) 2 MG/0.4ML SOAJ Use as directed 1 Package 0    ALPRAZolam (XANAX) 0.5 MG tablet Take 0.5 mg by mouth nightly as needed. No current facility-administered medications for this visit. I will continue his current medication regimen  which is part of the above treatment schedule. It has been helping with Mr. Rosalie Celeste chronic  medical problems which for this visit include:   Diagnoses of Chronic pain syndrome, Lumbar radiculitis, Failed neck syndrome, DDD (degenerative disc disease), lumbar, Postlaminectomy syndrome, lumbar region, Brachial neuritis or radiculitis, Lumbar spondylosis, and Fibromyalgia were pertinent to this visit. Risks and benefits of the medications and other alternative treatments  including no treatment were discussed with the patient. The common side effects of these medications were also explained to the patient. Informed verbal consent was obtained. Goals of current treatment regimen include improvement in pain, restoration of functioning- with focus on improvement in physical performance, general activity, work or disability,emotional distress, health care utilization and  decreased medication consumption. Will continue to monitor progress towards achieving/maintaining therapeutic goals with special emphasis on  1. Improvement in perceived interfernce  of pain with ADL's. Ability to do home exercises independently.  Ability to do household chores indoor and/or outdoor work and social and leisure activities. Improve psychosocial and physical functioning. - he is showing progression towards this treatment goal with the current regimen. He was advised against drinking alcohol with the narcotic pain medicines, advised against driving or handling machinery while adjusting the dose of medicines or if having cognitive  issues related to the current medications. Risk of overdose and death, if medicines not taken as prescribed, were also discussed. If the patient develops new symptoms or if the symptoms worsen, the patient should call the office. While transcribing every attempt was made to maintain the accuracy of the note in terms of it's contents,there may have been some errors made inadvertently. Thank you for allowing me to participate in the care of this patient.     Andie Forte MD.    Cc: Yuliya Becerra MD

## 2022-12-05 ENCOUNTER — OFFICE VISIT (OUTPATIENT)
Dept: PAIN MANAGEMENT | Age: 62
End: 2022-12-05
Payer: COMMERCIAL

## 2022-12-05 VITALS
WEIGHT: 200 LBS | SYSTOLIC BLOOD PRESSURE: 133 MMHG | HEART RATE: 97 BPM | BODY MASS INDEX: 25.68 KG/M2 | DIASTOLIC BLOOD PRESSURE: 89 MMHG

## 2022-12-05 DIAGNOSIS — G89.4 CHRONIC PAIN SYNDROME: ICD-10-CM

## 2022-12-05 DIAGNOSIS — M96.1 FAILED NECK SYNDROME: ICD-10-CM

## 2022-12-05 DIAGNOSIS — M47.816 LUMBAR SPONDYLOSIS: ICD-10-CM

## 2022-12-05 DIAGNOSIS — M51.36 DDD (DEGENERATIVE DISC DISEASE), LUMBAR: ICD-10-CM

## 2022-12-05 DIAGNOSIS — M96.1 POSTLAMINECTOMY SYNDROME, LUMBAR REGION: ICD-10-CM

## 2022-12-05 DIAGNOSIS — M79.7 FIBROMYALGIA: ICD-10-CM

## 2022-12-05 DIAGNOSIS — M54.16 LUMBAR RADICULITIS: ICD-10-CM

## 2022-12-05 DIAGNOSIS — M54.12 BRACHIAL NEURITIS OR RADICULITIS: ICD-10-CM

## 2022-12-05 PROCEDURE — 99213 OFFICE O/P EST LOW 20 MIN: CPT | Performed by: INTERNAL MEDICINE

## 2022-12-05 RX ORDER — OXYCODONE AND ACETAMINOPHEN 7.5; 325 MG/1; MG/1
1 TABLET ORAL EVERY 6 HOURS PRN
Qty: 120 TABLET | Refills: 0 | Status: SHIPPED | OUTPATIENT
Start: 2022-12-05 | End: 2023-01-04

## 2022-12-05 RX ORDER — ZOLPIDEM TARTRATE 10 MG/1
10 TABLET ORAL NIGHTLY PRN
Qty: 30 TABLET | Refills: 1 | Status: SHIPPED | OUTPATIENT
Start: 2022-12-05 | End: 2023-01-04

## 2022-12-05 NOTE — PROGRESS NOTES
Stevie Foley  1960  [id number]      HISTORY OF PRESENT ILLNESS:  Mr. Claudia Quarles is a 65 y.o. male returns for a follow up visit for pain management  He has a diagnosis of   1. Chronic pain syndrome    2. Lumbar radiculitis    3. Failed neck syndrome    4. DDD (degenerative disc disease), lumbar    5. Postlaminectomy syndrome, lumbar region    6. Brachial neuritis or radiculitis    7. Lumbar spondylosis    8. Fibromyalgia    . He complains of pain in the  neck, upper back, lower back, right knee with radiation to the right hip, right leg, right foot  He rates the pain 7/10 and describes it as sharp, burning, numbness, pins and needles. Current treatment regimen has helped relieve about 30% of the pain. He denies any side effects from the current pain regimen. Patient reports that since the last follow up visit the physical functioning is worse, family/social relationships are unchanged, mood is unchanged sleep patterns are worse, and that the overall functioning is unchanged. Patient denies misusing/abusing his narcotic pain medications or using any illegal drugs. There are No indicators for possible drug abuse, addiction or diversion problems, patient states he has been doing fair. Mr. Claudia Quarles states he has been having some problems with sleep. Patients CARMEN was denied by insurance. He states he has been having pain in the right leg and foot which is ongoing. He states he has been doing his exercises at home with Danielle exercises and core strengthening. ALLERGIES: Patients list of allergies were reviewed     MEDICATIONS: Mr. Claudia Quarles list of medications were reviewed. His current medications are   Outpatient Medications Prior to Visit   Medication Sig Dispense Refill    zolpidem (AMBIEN) 10 MG tablet Take 1 tablet by mouth nightly as needed for Sleep for up to 30 days.  30 tablet 1    oxyCODONE-acetaminophen (PERCOCET) 7.5-325 MG per tablet Take 1 tablet by mouth every 6 hours as needed for Pain (max 4 per day) for up to 30 days. 120 tablet 0    diclofenac (VOLTAREN) 75 MG EC tablet Take 1 tablet by mouth 2 times daily as needed for Pain 60 tablet 1    Naloxone HCl (EVZIO) 2 MG/0.4ML SOAJ Use as directed 1 Package 0    ALPRAZolam (XANAX) 0.5 MG tablet Take 0.5 mg by mouth nightly as needed. No facility-administered medications prior to visit. REVIEW OF SYSTEMS:    Respiratory: Negative for apnea, chest tightness and shortness of breath or change in baseline breathing. PHYSICAL EXAM:   Nursing note and vitals reviewed. /89   Pulse 97   Wt 200 lb (90.7 kg)   BMI 25.68 kg/m²   Constitutional: He appears well-developed and well-nourished. No acute distress. Cardiovascular: Normal rate, regular rhythm, normal heart sounds, and does not have murmur. Pulmonary/Chest: Effort normal. No respiratory distress. He does not have wheezes in the lung fields. He has no rales. Neurological/Psychiatric:He is alert and oriented to person, place, and time. Coordination is  normal.  His mood isAppropriate and affect is Neutral/Euthymic(normal) . His    IMPRESSION:   1. Chronic pain syndrome    2. Lumbar radiculitis    3. Failed neck syndrome    4. DDD (degenerative disc disease), lumbar    5. Postlaminectomy syndrome, lumbar region    6. Brachial neuritis or radiculitis    7. Lumbar spondylosis    8. Fibromyalgia        PLAN:  Informed verbal consent was obtained  -ROM/Stretching exercises as advised   -Continue with Percocet 4 per day  -Continue with all other adjuvant medications as before   -MRI lumbar spine from 3/2021 reviewed   -Will reschedule CARMEN  -He has been doing exercises at home  -TPI has no lasting relief  -Using NSAID's along with other adjuvants   Current Outpatient Medications   Medication Sig Dispense Refill    zolpidem (AMBIEN) 10 MG tablet Take 1 tablet by mouth nightly as needed for Sleep for up to 30 days.  30 tablet 1    oxyCODONE-acetaminophen (PERCOCET) 7.5-325 MG per tablet Take 1 tablet by mouth every 6 hours as needed for Pain (max 4 per day) for up to 30 days. 120 tablet 0    diclofenac (VOLTAREN) 75 MG EC tablet Take 1 tablet by mouth 2 times daily as needed for Pain 60 tablet 1    Naloxone HCl (EVZIO) 2 MG/0.4ML SOAJ Use as directed 1 Package 0    ALPRAZolam (XANAX) 0.5 MG tablet Take 0.5 mg by mouth nightly as needed. No current facility-administered medications for this visit. I will continue his current medication regimen  which is part of the above treatment schedule. It has been helping with Mr. Violette Ryan chronic  medical problems which for this visit include:   Diagnoses of Chronic pain syndrome, Lumbar radiculitis, Failed neck syndrome, DDD (degenerative disc disease), lumbar, Postlaminectomy syndrome, lumbar region, Brachial neuritis or radiculitis, Lumbar spondylosis, and Fibromyalgia were pertinent to this visit. Risks and benefits of the medications and other alternative treatments  including no treatment were discussed with the patient. The common side effects of these medications were also explained to the patient. Informed verbal consent was obtained. Goals of current treatment regimen include improvement in pain, restoration of functioning- with focus on improvement in physical performance, general activity, work or disability,emotional distress, health care utilization and  decreased medication consumption. Will continue to monitor progress towards achieving/maintaining therapeutic goals with special emphasis on  1. Improvement in perceived interfernce  of pain with ADL's. Ability to do home exercises independently. Ability to do household chores indoor and/or outdoor work and social and leisure activities. Improve psychosocial and physical functioning. - he is showing progression towards this treatment goal with the current regimen.     He was advised against drinking alcohol with the narcotic pain medicines, advised against driving or handling machinery while adjusting the dose of medicines or if having cognitive  issues related to the current medications. Risk of overdose and death, if medicines not taken as prescribed, were also discussed. If the patient develops new symptoms or if the symptoms worsen, the patient should call the office. While transcribing every attempt was made to maintain the accuracy of the note in terms of it's contents,there may have been some errors made inadvertently. Thank you for allowing me to participate in the care of this patient.     Dong Shaffer MD.    Cc: Kiana Alegre MD

## 2022-12-05 NOTE — LETTER
THUY INJECTION ORDER    Date:  12/5/22      Patient: Torri Arellano     YOB: 1960    Patient Home Phone: [phone] (home)    Diagnosis:M54.16,M51.36,M96.1,M47.816,G89.4    Levels: L4-L5, L5-S1    Side: Please check  []LT     [x]RT     []ONI     []Midline    Location of Injection: Please check  []Cervical IL Thuy   [x]Lumbar Transforaminal THUY  []Cervical TF Thuy              []Lumbar Interlaminar THUY  []Cervical Facet Injection  [] Lumbar Facet Injection    []Cervical Medial Branch Block []Lumbar Medial Branch Block  []Other:                                              []Lumbar Interlaminar THUY                                                 [] SI Injection       Service Provider or Service Location: Please check  [][facility]/Dr. Dianne Myers    [x][facility]  []Dr. Marcie Dalton    []Dr. Tubbs Patient   []Other:     Ordering Physician: Aguilar Blair MD  Signature: Electronically signed by Aguilar Blair MD    Comments or Special Instructions: Allergies:    Allergies   Allergen Reactions    Penicillins     Morphine Nausea And Vomiting       First Insurance:  Payor: Chong Anderson / Plan: [employer] POS OPEN ACCESS  / Product Type: *No Product type* /                                ______________________________________________________________________  To be filled in by Scheduling:  Scheduled Date:    Scheduled Time:  Procedure Code:  Pre Cert Information:

## 2022-12-14 ENCOUNTER — HOSPITAL ENCOUNTER (OUTPATIENT)
Dept: INTERVENTIONAL RADIOLOGY/VASCULAR | Age: 62
Discharge: HOME | End: 2022-12-14
Attending: RADIOLOGY | Admitting: RADIOLOGY
Payer: COMMERCIAL

## 2022-12-14 PROCEDURE — 6360000002 HC RX W HCPCS

## 2022-12-14 PROCEDURE — 2500000003 HC RX 250 WO HCPCS

## 2022-12-14 PROCEDURE — 6360000004 HC RX CONTRAST MEDICATION: Performed by: RADIOLOGY

## 2022-12-14 PROCEDURE — 64483 NJX AA&/STRD TFRM EPI L/S 1: CPT

## 2022-12-14 PROCEDURE — 64484 NJX AA&/STRD TFRM EPI L/S EA: CPT

## 2022-12-14 PROCEDURE — 2709999900 HC NON-CHARGEABLE SUPPLY

## 2022-12-14 RX ADMIN — IOHEXOL 10 ML: 180 INJECTION INTRAVENOUS at 09:35

## 2022-12-14 NOTE — DISCHARGE INSTRUCTIONS
[facility] [employer]  Cardiovascular Special Procedures  Cervical Epidural Steroid Injection  Patient Discharge Instructions      When [address] should not [address] the day of the procedure. You may experience arm weakness during the first 24 hours following the procedure. However, you do not need to stay in bed when you get home. Even if you feel better right away, avoid activities that may strain your neck. Keep in mind that most patients feel increased pain for the first 24 hours. You should start feeling some pain relief 3-7 days following the injection. This is because the steroid will start working within three days of the injection with maximal effect by one week. At that time, we will evaluate your pain level to determine the need for another steroid injection. Remove bandaid(s) within 24 hours. When to Call Your Doctor    Call right away if you notice any of the following symptoms:    Severe pain or headache;  Fever or chills; Redness or swelling around the injection site. You may contact [address] [place], [place] [address]. for any questions or problems that may occur at [phone] during the hours of 9am-5pm Monday-Friday, or the hospital  after hours at [phone], to have the interventional radiologist on call paged. [facility] [employer]  Cardiovascular Special Procedures  General Discharge Instructions    PROCEDURE: ____Cervical Steroid_________    __x__ Jamas Seals may be drowsy or lightheaded after receiving sedation.  DO NOT operate a vehicle (automobile, bicycle, motorcycle, machinery, or power tools), make any important decisions or sign any important/legal documents, or drink alcoholic beverages for the next 24 hours  ___x_ We strongly suggest that a responsible adult be with you for the next 24 hours for your protection and safety  ____ If the intravenous catheter site is painful, apply warm wet compresses on the site until the soreness is relieved and elevate the arm above the heart. Call your physician if no improvement in 2 to 3 days    DIETARY INSTRUCTIONS:    ____ Drink extra fluids over the next 24 hours (If not contraindicated by illness or by                   physician order)  ____ Start with clear liquids and progress to normal diet as you feel like eating. If you experience nausea or repeated episodes of vomiting, which persist beyond 12-24 hours, notify your doctor        __x__ Resume your previous diet    ACTIVITY INSTRUCTIONS:    ____ See other instructions  ____ No special instructions  ____ Rest for 24 hours    __x__ Up as tolerated  __x__ Increase activity as tolerated    Wound/Dressing Instructions:  ____ See other instructions  __x__ May shower, tomorrow  ___x_ Remove bandage within 24 hours    MEDICATION INSTRUCTIONS:    ___x_ See Medication Reconciliation Sheet      SPECIAL INSTRUCTIONS:              Please make sure that you follow-up with your doctors office for your results. FOLLOW-UP APPOINTMENT    Follow up with MD in 2 weeks. Belongings returned to patient and/or family: Yes. The Discharge Instructions have been explained to me. I understand and can verbalize these instructions.

## 2023-01-09 ENCOUNTER — OFFICE VISIT (OUTPATIENT)
Dept: PAIN MANAGEMENT | Age: 63
End: 2023-01-09
Payer: COMMERCIAL

## 2023-01-09 VITALS
HEART RATE: 93 BPM | SYSTOLIC BLOOD PRESSURE: 150 MMHG | DIASTOLIC BLOOD PRESSURE: 94 MMHG | WEIGHT: 205.4 LBS | BODY MASS INDEX: 26.37 KG/M2

## 2023-01-09 DIAGNOSIS — M96.1 POSTLAMINECTOMY SYNDROME, LUMBAR REGION: ICD-10-CM

## 2023-01-09 DIAGNOSIS — M54.12 BRACHIAL NEURITIS OR RADICULITIS: ICD-10-CM

## 2023-01-09 DIAGNOSIS — M51.36 DDD (DEGENERATIVE DISC DISEASE), LUMBAR: ICD-10-CM

## 2023-01-09 DIAGNOSIS — M79.7 FIBROMYALGIA: ICD-10-CM

## 2023-01-09 DIAGNOSIS — G89.4 CHRONIC PAIN SYNDROME: ICD-10-CM

## 2023-01-09 DIAGNOSIS — M96.1 FAILED NECK SYNDROME: ICD-10-CM

## 2023-01-09 DIAGNOSIS — M47.816 LUMBAR SPONDYLOSIS: ICD-10-CM

## 2023-01-09 DIAGNOSIS — M54.16 LUMBAR RADICULITIS: ICD-10-CM

## 2023-01-09 PROCEDURE — 99213 OFFICE O/P EST LOW 20 MIN: CPT | Performed by: INTERNAL MEDICINE

## 2023-01-09 RX ORDER — OXYCODONE AND ACETAMINOPHEN 7.5; 325 MG/1; MG/1
1 TABLET ORAL EVERY 6 HOURS PRN
Qty: 120 TABLET | Refills: 0 | Status: SHIPPED | OUTPATIENT
Start: 2023-01-09 | End: 2023-01-16 | Stop reason: SDUPTHER

## 2023-01-09 RX ORDER — ZOLPIDEM TARTRATE 10 MG/1
10 TABLET ORAL NIGHTLY PRN
Qty: 30 TABLET | Refills: 1 | Status: SHIPPED | OUTPATIENT
Start: 2023-01-09 | End: 2023-02-08

## 2023-01-09 RX ORDER — DICLOFENAC SODIUM 75 MG/1
75 TABLET, DELAYED RELEASE ORAL 2 TIMES DAILY PRN
Qty: 60 TABLET | Refills: 1 | Status: SHIPPED | OUTPATIENT
Start: 2023-01-09

## 2023-01-09 NOTE — LETTER
CARMEN INJECTION ORDER    Date:  1/9/23      Patient: Michelle Casillas     YOB: 1960    Patient Home Phone: [phone] (home)    Diagnosis: G89.4,M54.16,M51.36    Levels: L4-L5-L5-S1    Side: Please check  []LT     [x]RT     []ONI     []Midline    Location of Injection: Please check  []Cervical IL Carmen   [x]Lumbar Transforaminal CARMEN  []Cervical TF Carmen              []Lumbar Interlaminar CARMEN  []Cervical Facet Injection  [] Lumbar Facet Injection    []Cervical Medial Branch Block []Lumbar Medial Branch Block  []Other:                                              []Lumbar Interlaminar CARMEN                                                 [] SI Injection       Service Provider or Service Location: Please check  [][facility]/Dr. Truman Mcguire    [][facility]  []Dr. Miguel Velasquez    []Dr. Bjorn Ren   []Other:     Ordering Physician: Vinay Zaman MD  Signature: Electronically signed by Vinay Zaman MD    Comments or Special Instructions: Allergies: Allergies   Allergen Reactions    Penicillins     Morphine Nausea And Vomiting       First Insurance:  Payor: [address] / Plan: [facility] / Product Type:  Indemnity /                                ______________________________________________________________________  To be filled in by Scheduling:  Scheduled Date:    Scheduled Time:  Procedure Code:  Pre Cert Information:

## 2023-01-09 NOTE — PROGRESS NOTES
Reese Rob  1960  [id number]      HISTORY OF PRESENT ILLNESS:  Mr. Ingris Wheat is a 65 y.o. male returns for a follow up visit for pain management  He has a diagnosis of   1. Chronic pain syndrome    2. Lumbar radiculitis    3. Failed neck syndrome    4. DDD (degenerative disc disease), lumbar    5. Postlaminectomy syndrome, lumbar region    6. Brachial neuritis or radiculitis    7. Lumbar spondylosis    8. Fibromyalgia    9. Primary insomnia    10. Mood disorder ([address]    . He complains of pain in the  Neck, upper and lower back, right hand, right leg   He rates the pain 5/10 and describes it as sharp, aching, burning, numbness, pins and needles. Current treatment regimen has helped relieve about 50% of the pain. He denies any side effects from the current pain regimen. Patient reports that since the last follow up visit the physical functioning is unchanged, family/social relationships are unchanged, mood is unchanged sleep patterns are unchanged, and that the overall functioning is unchanged. Patient denies misusing/abusing his narcotic pain medications or using any illegal drugs. There are No indicators for possible drug abuse, addiction or diversion problems. Patient complains he is having increase pain in the back. He states status post CARMEN helped the leg pain some, but now the pain is coming back. He mentions he is working full time still, but its difficult to continue working. ALLERGIES: Patients list of allergies were reviewed     MEDICATIONS: Mr. Ingris Wheat list of medications were reviewed. His current medications are   Outpatient Medications Prior to Visit   Medication Sig Dispense Refill    diclofenac (VOLTAREN) 75 MG EC tablet Take 1 tablet by mouth 2 times daily as needed for Pain 60 tablet 1    Naloxone HCl (EVZIO) 2 MG/0.4ML SOAJ Use as directed 1 Package 0    ALPRAZolam (XANAX) 0.5 MG tablet Take 0.5 mg by mouth nightly as needed.          No facility-administered medications prior to visit. REVIEW OF SYSTEMS:    Respiratory: Negative for apnea, chest tightness and shortness of breath or change in baseline breathing. PHYSICAL EXAM:   Nursing note and vitals reviewed. BP (!) 150/94   Pulse 93   Wt 205 lb 6.4 oz (93.2 kg)   BMI 26.37 kg/m²   Constitutional: He appears well-developed and well-nourished. No acute distress. Cardiovascular: Normal rate, regular rhythm, normal heart sounds, and does not have murmur. Pulmonary/Chest: Effort normal. No respiratory distress. He does not have wheezes in the lung fields. He has no rales. Neurological/Psychiatric:He is alert and oriented to person, place, and time. Coordination is  normal.  His mood isAppropriate and affect is Neutral/Euthymic(normal) . IMPRESSION:   1. Chronic pain syndrome    2. Lumbar radiculitis    3. Failed neck syndrome    4. DDD (degenerative disc disease), lumbar    5. Postlaminectomy syndrome, lumbar region    6. Brachial neuritis or radiculitis    7. Lumbar spondylosis    8. Fibromyalgia        PLAN:  Informed verbal consent was obtained  -ROM/Stretching exercises as advised   -Continue with Percocet 4 per day   -He was advised to increase fluids ( 5-7  glasses of fluid daily), limit caffeine, avoid cheese products, increase dietary fiber, increase activity and exercise as tolerated and relax regularly and enjoy meals   -Continue with all other adjuvants as before   -Will reorder CARMEN of Lumbar spine for 2nd one and see if it helps any longer, TF right L4-L5,L5-S1   Current Outpatient Medications   Medication Sig Dispense Refill    diclofenac (VOLTAREN) 75 MG EC tablet Take 1 tablet by mouth 2 times daily as needed for Pain 60 tablet 1    Naloxone HCl (EVZIO) 2 MG/0.4ML SOAJ Use as directed 1 Package 0    ALPRAZolam (XANAX) 0.5 MG tablet Take 0.5 mg by mouth nightly as needed. No current facility-administered medications for this visit.      I will continue his current medication regimen which is part of the above treatment schedule. It has been helping with Mr. Ivis Solitario chronic  medical problems which for this visit include:   Diagnoses of Chronic pain syndrome, Lumbar radiculitis, Failed neck syndrome, DDD (degenerative disc disease), lumbar, Postlaminectomy syndrome, lumbar region, Brachial neuritis or radiculitis, Lumbar spondylosis, Fibromyalgia, Primary insomnia, and Mood disorder ([address] were pertinent to this visit. Risks and benefits of the medications and other alternative treatments  including no treatment were discussed with the patient. The common side effects of these medications were also explained to the patient. Informed verbal consent was obtained. Goals of current treatment regimen include improvement in pain, restoration of functioning- with focus on improvement in physical performance, general activity, work or disability,emotional distress, health care utilization and  decreased medication consumption. Will continue to monitor progress towards achieving/maintaining therapeutic goals with special emphasis on  1. Improvement in perceived interfernce  of pain with ADL's. Ability to do home exercises independently. Ability to do household chores indoor and/or outdoor work and social and leisure activities. Improve psychosocial and physical functioning. - he is showing progression towards this treatment goal with the current regimen. He was advised against drinking alcohol with the narcotic pain medicines, advised against driving or handling machinery while adjusting the dose of medicines or if having cognitive  issues related to the current medications. Risk of overdose and death, if medicines not taken as prescribed, were also discussed. If the patient develops new symptoms or if the symptoms worsen, the patient should call the office.     While transcribing every attempt was made to maintain the accuracy of the note in terms of it's contents,there may have been some errors made inadvertently. Thank you for allowing me to participate in the care of this patient.     Vinay Zaman MD.    Cc: Everett Lomeli MD

## 2023-01-13 ENCOUNTER — TELEPHONE (OUTPATIENT)
Dept: PAIN MANAGEMENT | Age: 63
End: 2023-01-13

## 2023-01-13 DIAGNOSIS — M51.36 DDD (DEGENERATIVE DISC DISEASE), LUMBAR: ICD-10-CM

## 2023-01-13 DIAGNOSIS — M96.1 FAILED NECK SYNDROME: ICD-10-CM

## 2023-01-13 DIAGNOSIS — M47.816 LUMBAR SPONDYLOSIS: ICD-10-CM

## 2023-01-13 DIAGNOSIS — M96.1 POSTLAMINECTOMY SYNDROME, LUMBAR REGION: ICD-10-CM

## 2023-01-13 DIAGNOSIS — M79.7 FIBROMYALGIA: ICD-10-CM

## 2023-01-13 DIAGNOSIS — M54.12 BRACHIAL NEURITIS OR RADICULITIS: ICD-10-CM

## 2023-01-13 DIAGNOSIS — G89.4 CHRONIC PAIN SYNDROME: ICD-10-CM

## 2023-01-13 DIAGNOSIS — M54.16 LUMBAR RADICULITIS: ICD-10-CM

## 2023-01-13 NOTE — TELEPHONE ENCOUNTER
Patient called and stated he needs his medications sent to [facility] [id number] - P [phone] - F [phone]    Patient stated they were not supposed to be sent to [facility].     Patient is requesting a callback. Please advise.

## 2023-01-13 NOTE — TELEPHONE ENCOUNTER
Call patient to let him that [address] [place] [address] was OOO the office but would try and reach to him to see if he could send the medication, but he may have to wait till Monday until it could be sent. Patient stated that the medication needed to be sent to [place]. [facility] asked [facility]. I advised him that [facility]. Patient voiced his understanding.

## 2023-01-16 RX ORDER — OXYCODONE AND ACETAMINOPHEN 7.5; 325 MG/1; MG/1
1 TABLET ORAL EVERY 6 HOURS PRN
Qty: 120 TABLET | Refills: 0 | Status: SHIPPED | OUTPATIENT
Start: 2023-01-16 | End: 2023-02-15

## 2023-01-30 ENCOUNTER — OFFICE VISIT (OUTPATIENT)
Dept: PAIN MANAGEMENT | Age: 63
End: 2023-01-30
Payer: COMMERCIAL

## 2023-01-30 VITALS
BODY MASS INDEX: 27.22 KG/M2 | SYSTOLIC BLOOD PRESSURE: 136 MMHG | DIASTOLIC BLOOD PRESSURE: 89 MMHG | WEIGHT: 212 LBS | HEART RATE: 94 BPM

## 2023-01-30 DIAGNOSIS — M47.816 LUMBAR SPONDYLOSIS: ICD-10-CM

## 2023-01-30 DIAGNOSIS — G89.4 CHRONIC PAIN SYNDROME: ICD-10-CM

## 2023-01-30 DIAGNOSIS — M54.16 LUMBAR RADICULITIS: ICD-10-CM

## 2023-01-30 DIAGNOSIS — M96.1 POSTLAMINECTOMY SYNDROME, LUMBAR REGION: ICD-10-CM

## 2023-01-30 DIAGNOSIS — M79.7 FIBROMYALGIA: ICD-10-CM

## 2023-01-30 DIAGNOSIS — M54.12 BRACHIAL NEURITIS OR RADICULITIS: ICD-10-CM

## 2023-01-30 DIAGNOSIS — M96.1 FAILED NECK SYNDROME: ICD-10-CM

## 2023-01-30 DIAGNOSIS — M51.36 DDD (DEGENERATIVE DISC DISEASE), LUMBAR: ICD-10-CM

## 2023-01-30 PROCEDURE — 99213 OFFICE O/P EST LOW 20 MIN: CPT | Performed by: INTERNAL MEDICINE

## 2023-01-30 RX ORDER — OXYCODONE AND ACETAMINOPHEN 7.5; 325 MG/1; MG/1
1 TABLET ORAL EVERY 6 HOURS PRN
Qty: 120 TABLET | Refills: 0 | Status: SHIPPED | OUTPATIENT
Start: 2023-01-30 | End: 2023-03-01

## 2023-01-30 NOTE — PROGRESS NOTES
Mansi Jazmin  1960  [id number]      HISTORY OF PRESENT ILLNESS:  Mr. Khadijah Logan is a 65 y.o. male returns for a follow up visit for pain management  He has a diagnosis of   1. Chronic pain syndrome    2. Lumbar radiculitis    3. Failed neck syndrome    4. DDD (degenerative disc disease), lumbar    5. Postlaminectomy syndrome, lumbar region    6. Brachial neuritis or radiculitis    7. Fibromyalgia    8. Lumbar spondylosis    . He complains of pain in the  Neck, upper and lower back, right leg, right ankle and foot. He rates the pain 5/10 and describes it as sharp, aching, burning. Current treatment regimen has helped relieve about 50% of the pain. He denies any side effects from the current pain regimen. Patient reports that since the last follow up visit the physical functioning is unchanged, family/social relationships are unchanged, mood is unchanged sleep patterns are unchanged, and that the overall functioning is unchanged. Patient denies misusing/abusing his narcotic pain medications or using any illegal drugs. There are No indicators for possible drug abuse, addiction or diversion problems. Patient states he has been doing about the same, managing with the medications. He says he is using Voltaren along with Percocet. He mentions he is using it 4 per day. He feels the last CARMEN was helpful, had TF L4-L5, L5-S1. ALLERGIES: Patients list of allergies were reviewed     MEDICATIONS: Mr. Khadijah Logan list of medications were reviewed. His current medications are   Outpatient Medications Prior to Visit   Medication Sig Dispense Refill    oxyCODONE-acetaminophen (PERCOCET) 7.5-325 MG per tablet Take 1 tablet by mouth every 6 hours as needed for Pain (max 4 per day) for up to 30 days.  120 tablet 0    diclofenac (VOLTAREN) 75 MG EC tablet Take 1 tablet by mouth 2 times daily as needed for Pain 60 tablet 1    zolpidem (AMBIEN) 10 MG tablet Take 1 tablet by mouth nightly as needed for Sleep for up to 30 days. 30 tablet 1    Naloxone HCl (EVZIO) 2 MG/0.4ML SOAJ Use as directed 1 Package 0    ALPRAZolam (XANAX) 0.5 MG tablet Take 0.5 mg by mouth nightly as needed.         No facility-administered medications prior to visit.        REVIEW OF SYSTEMS:    Respiratory: Negative for apnea, chest tightness and shortness of breath or change in baseline breathing.      PHYSICAL EXAM:   Nursing note and vitals reviewed. /89   Pulse 94   Wt 212 lb (96.2 kg)   BMI 27.22 kg/m²   Constitutional: He appears well-developed and well-nourished. No acute distress.   Cardiovascular: Normal rate, regular rhythm, normal heart sounds, and does not have murmur.     Pulmonary/Chest: Effort normal. No respiratory distress. He does not have wheezes in the lung fields. He has no rales.     Neurological/Psychiatric:He is alert and oriented to person, place, and time. Coordination is  normal.  His mood isAppropriate and affect is Neutral/Euthymic(normal) .     IMPRESSION:   1. Chronic pain syndrome    2. Lumbar radiculitis    3. Failed neck syndrome    4. DDD (degenerative disc disease), lumbar    5. Postlaminectomy syndrome, lumbar region    6. Brachial neuritis or radiculitis    7. Fibromyalgia    8. Lumbar spondylosis        PLAN:  Informed verbal consent was obtained  -ROM/Stretching exercises as advised   -Interim history reviewed   -He was advised to increase fluids ( 5-7  glasses of fluid daily), limit caffeine, avoid cheese products, increase dietary fiber, increase activity and exercise as tolerated and relax regularly and enjoy meals   -Continue with Percocet 4 per day   -Danielle exercises given    Current Outpatient Medications   Medication Sig Dispense Refill    oxyCODONE-acetaminophen (PERCOCET) 7.5-325 MG per tablet Take 1 tablet by mouth every 6 hours as needed for Pain (max 4 per day) for up to 30 days. 120 tablet 0    diclofenac (VOLTAREN) 75 MG EC tablet Take 1 tablet by mouth 2 times daily as needed for Pain 60  tablet 1    zolpidem (AMBIEN) 10 MG tablet Take 1 tablet by mouth nightly as needed for Sleep for up to 30 days. 30 tablet 1    Naloxone HCl (EVZIO) 2 MG/0.4ML SOAJ Use as directed 1 Package 0    ALPRAZolam (XANAX) 0.5 MG tablet Take 0.5 mg by mouth nightly as needed. No current facility-administered medications for this visit. I will continue his current medication regimen  which is part of the above treatment schedule. It has been helping with Mr. Sybil Willis chronic  medical problems which for this visit include:   Diagnoses of Chronic pain syndrome, Lumbar radiculitis, Failed neck syndrome, DDD (degenerative disc disease), lumbar, Postlaminectomy syndrome, lumbar region, Brachial neuritis or radiculitis, Fibromyalgia, and Lumbar spondylosis were pertinent to this visit. Risks and benefits of the medications and other alternative treatments  including no treatment were discussed with the patient. The common side effects of these medications were also explained to the patient. Informed verbal consent was obtained. Goals of current treatment regimen include improvement in pain, restoration of functioning- with focus on improvement in physical performance, general activity, work or disability,emotional distress, health care utilization and  decreased medication consumption. Will continue to monitor progress towards achieving/maintaining therapeutic goals with special emphasis on  1. Improvement in perceived interfernce  of pain with ADL's. Ability to do home exercises independently. Ability to do household chores indoor and/or outdoor work and social and leisure activities. Improve psychosocial and physical functioning. - he is showing progression towards this treatment goal with the current regimen.        He was advised against drinking alcohol with the narcotic pain medicines, advised against driving or handling machinery while adjusting the dose of medicines or if having cognitive  issues related to the current medications. Risk of overdose and death, if medicines not taken as prescribed, were also discussed. If the patient develops new symptoms or if the symptoms worsen, the patient should call the office. While transcribing every attempt was made to maintain the accuracy of the note in terms of it's contents,there may have been some errors made inadvertently. Thank you for allowing me to participate in the care of this patient.     Christian Martell MD.    Cc: Alycia Ruiz MD

## 2023-03-06 ENCOUNTER — OFFICE VISIT (OUTPATIENT)
Dept: PAIN MANAGEMENT | Age: 63
End: 2023-03-06
Payer: COMMERCIAL

## 2023-03-06 VITALS
SYSTOLIC BLOOD PRESSURE: 146 MMHG | DIASTOLIC BLOOD PRESSURE: 91 MMHG | BODY MASS INDEX: 27.18 KG/M2 | OXYGEN SATURATION: 94 % | HEIGHT: 74 IN | HEART RATE: 83 BPM | WEIGHT: 211.8 LBS

## 2023-03-06 DIAGNOSIS — M96.1 FAILED NECK SYNDROME: ICD-10-CM

## 2023-03-06 DIAGNOSIS — M79.7 FIBROMYALGIA: ICD-10-CM

## 2023-03-06 DIAGNOSIS — M54.16 LUMBAR RADICULITIS: ICD-10-CM

## 2023-03-06 DIAGNOSIS — M96.1 POSTLAMINECTOMY SYNDROME, LUMBAR REGION: ICD-10-CM

## 2023-03-06 DIAGNOSIS — G89.4 CHRONIC PAIN SYNDROME: ICD-10-CM

## 2023-03-06 DIAGNOSIS — M54.12 BRACHIAL NEURITIS OR RADICULITIS: ICD-10-CM

## 2023-03-06 DIAGNOSIS — M47.816 LUMBAR SPONDYLOSIS: ICD-10-CM

## 2023-03-06 DIAGNOSIS — M51.36 DDD (DEGENERATIVE DISC DISEASE), LUMBAR: ICD-10-CM

## 2023-03-06 PROCEDURE — 99213 OFFICE O/P EST LOW 20 MIN: CPT | Performed by: INTERNAL MEDICINE

## 2023-03-06 RX ORDER — DICLOFENAC SODIUM 75 MG/1
75 TABLET, DELAYED RELEASE ORAL 2 TIMES DAILY PRN
Qty: 60 TABLET | Refills: 1 | Status: SHIPPED | OUTPATIENT
Start: 2023-03-06

## 2023-03-06 RX ORDER — OXYCODONE AND ACETAMINOPHEN 7.5; 325 MG/1; MG/1
1 TABLET ORAL EVERY 6 HOURS PRN
Qty: 120 TABLET | Refills: 0 | Status: SHIPPED | OUTPATIENT
Start: 2023-03-06 | End: 2023-04-05

## 2023-03-06 NOTE — PROGRESS NOTES
Mike Hernandez  1960  [id number]      HISTORY OF PRESENT ILLNESS:  Mr. Gem Goddard is a 65 y.o. male returns for a follow up visit for pain management  He has a diagnosis of   1. Other fatigue    2. Chronic pain syndrome    3. Lumbar radiculitis    4. Failed neck syndrome    5. Brachial neuritis or radiculitis    6. Postlaminectomy syndrome, lumbar region    7. Fibromyalgia    8. DDD (degenerative disc disease), lumbar    9. Encounter for therapeutic drug monitoring    10. Primary insomnia    11. Lumbar spondylosis    12. Mood disorder ([address]    . He complains of pain in the  Neck, upper back, lower back, right leg, left hand   He rates the pain 5/10 and describes it as sharp, aching, numbness. Current treatment regimen has helped relieve about 50% of the pain. He denies any side effects from the current pain regimen. Patient reports that since the last follow up visit the physical functioning is worse, family/social relationships are unchanged, mood is unchanged sleep patterns are worse, and that the overall functioning is unchanged. Patient denies misusing/abusing his narcotic pain medications or using any illegal drugs. There are No indicators for possible drug abuse, addiction or diversion problems. Patient states he has been doing about the same. Mr. Gem Goddard states his back and neck hurts the most. He says the pain is greater in the back than the neck. Patient reports he is working full time still. He states with increased physical activity it is causing increased pain. Patient mentions he is using Percocet 7.5 mg 4 per day along with Ambien. ALLERGIES: Patients list of allergies were reviewed     MEDICATIONS: Mr. Gem Goddard list of medications were reviewed. His current medications are   Outpatient Medications Prior to Visit   Medication Sig Dispense Refill    diclofenac (VOLTAREN) 75 MG EC tablet Take 1 tablet by mouth 2 times daily as needed for Pain 60 tablet 1    Naloxone HCl (EVZIO) 2 MG/0.4ML SOAJ Use as directed 1 Package 0    ALPRAZolam (XANAX) 0.5 MG tablet Take 0.5 mg by mouth nightly as needed. No facility-administered medications prior to visit. REVIEW OF SYSTEMS:    Respiratory: Negative for apnea, chest tightness and shortness of breath or change in baseline breathing. PHYSICAL EXAM:   Nursing note and vitals reviewed. BP (!) 146/91   Pulse 83   Ht 6' 2\" (1.88 m)   Wt 211 lb 12.8 oz (96.1 kg)   SpO2 94%   BMI 27.19 kg/m²   Constitutional: He appears well-developed and well-nourished. No acute distress. Cardiovascular: Normal rate, regular rhythm, normal heart sounds, and does not have murmur. Pulmonary/Chest: Effort normal. No respiratory distress. He does not have wheezes in the lung fields. He has no rales. Neurological/Psychiatric:He is alert and oriented to person, place, and time. Coordination is  normal.  His mood isAppropriate and affect is Neutral/Euthymic(normal) . IMPRESSION:   1. Chronic pain syndrome    2. Lumbar radiculitis    3. Failed neck syndrome    4. Brachial neuritis or radiculitis    5. Postlaminectomy syndrome, lumbar region    6. Fibromyalgia    7. DDD (degenerative disc disease), lumbar    8. Lumbar spondylosis        PLAN:  Informed verbal consent was obtained  -[place] record was obtained and reviewed  for the last one year and no indicators of drug misuse  were found. Any other controlled substance prescriptions  seen on the record have been accounted for, I am aware of the patient receiving these medications. Patrick Mireya [place]AR[place] record will be rechecked as part of office protocol. -ROM/stretching exercises as advised   -He was advised to increase fluids ( 5-7  glasses of fluid daily), limit caffeine, avoid cheese products, increase dietary fiber, increase activity and exercise as tolerated and relax regularly and enjoy meals   -Continue with Percocet 4 per day   -Labs ordered CBC, CMP, and TSH.    -Urine drug screen with GC/MS for opiates and drugs of abuse was ordered and will follow up on results. Current Outpatient Medications   Medication Sig Dispense Refill    diclofenac (VOLTAREN) 75 MG EC tablet Take 1 tablet by mouth 2 times daily as needed for Pain 60 tablet 1    Naloxone HCl (EVZIO) 2 MG/0.4ML SOAJ Use as directed 1 Package 0    ALPRAZolam (XANAX) 0.5 MG tablet Take 0.5 mg by mouth nightly as needed. No current facility-administered medications for this visit. I will continue his current medication regimen  which is part of the above treatment schedule. It has been helping with Mr. Monique Dewitt chronic  medical problems which for this visit include: The primary encounter diagnosis was Other fatigue. Diagnoses of Chronic pain syndrome, Lumbar radiculitis, Failed neck syndrome, Brachial neuritis or radiculitis, Postlaminectomy syndrome, lumbar region, Fibromyalgia, DDD (degenerative disc disease), lumbar, Encounter for therapeutic drug monitoring, Primary insomnia, Lumbar spondylosis, and Mood disorder ([facility] [address] were also pertinent to this visit. Risks and benefits of the medications and other alternative treatments  including no treatment were discussed with the patient. The common side effects of these medications were also explained to the patient. Informed verbal consent was obtained. Goals of current treatment regimen include improvement in pain, restoration of functioning- with focus on improvement in physical performance, general activity, work or disability,emotional distress, health care utilization and  decreased medication consumption. Will continue to monitor progress towards achieving/maintaining therapeutic goals with special emphasis on  1. Improvement in perceived interfernce  of pain with ADL's. Ability to do home exercises independently. Ability to do household chores indoor and/or outdoor work and social and leisure activities. Improve psychosocial and physical functioning. - he is showing progression towards this treatment goal with the current regimen. 2.Ability to focus/concentrate at work and perform the duties required of him at work  Sit through a workday without lower extremity symptoms. Stand 30-60 minutes without lower extremity symptoms. Ability to lift up to 10-20 lbs. Ability to go up and down stairs. Sit 30-60 minutes  Without having to stand up frequently. - he is maintaining/progressing towards his work related goals with the current regimen. He was advised against drinking alcohol with the narcotic pain medicines, advised against driving or handling machinery while adjusting the dose of medicines or if having cognitive  issues related to the current medications. Risk of overdose and death, if medicines not taken as prescribed, were also discussed. If the patient develops new symptoms or if the symptoms worsen, the patient should call the office. While transcribing every attempt was made to maintain the accuracy of the note in terms of it's contents,there may have been some errors made inadvertently. Thank you for allowing me to participate in the care of this patient.     Terri Lilly MD.    Cc: Maxime Kuo MD

## 2023-03-16 ENCOUNTER — HOSPITAL ENCOUNTER (OUTPATIENT)
Dept: INTERVENTIONAL RADIOLOGY/VASCULAR | Age: 63
Discharge: HOME | End: 2023-03-16
Payer: COMMERCIAL

## 2023-03-16 DIAGNOSIS — M51.36 DEGENERATION OF LUMBAR INTERVERTEBRAL DISC: ICD-10-CM

## 2023-03-16 PROCEDURE — 2709999900 HC NON-CHARGEABLE SUPPLY

## 2023-03-16 PROCEDURE — 64483 NJX AA&/STRD TFRM EPI L/S 1: CPT

## 2023-03-16 PROCEDURE — 6360000002 HC RX W HCPCS

## 2023-03-16 PROCEDURE — 2500000003 HC RX 250 WO HCPCS

## 2023-03-16 PROCEDURE — 64484 NJX AA&/STRD TFRM EPI L/S EA: CPT

## 2023-03-16 PROCEDURE — 62323 NJX INTERLAMINAR LMBR/SAC: CPT

## 2023-03-16 NOTE — DISCHARGE INSTRUCTIONS
Lumbar Epidural Steroid Injection  Patient Discharge Instructions  When [address] [facility] should not drive the day of the procedure. You may experience leg weakness during the first 24 hours following the procedure. To prevent yourself from falling, it is important to have someone help you walk. However, you do not need to stay in bed when you get home. In fact, it is best to walk around if you feel up to it, but you will need assistance during the first 24 hours following the epidural steroid injection. Even if you feel better right away, avoid activities that may strain your back. Keep in mind that most patients feel increased pain for the first 24 hours. You should start feeling some pain relief 2-3 days following the injection. This is because the steroid will start working within three days of the injection with maximal effect by one week. At that time, we will evaluate your pain level to determine the need for another steroid injection. Remove bandaid(s) within 24 hours. When to Call Your Doctor    Call right away if you notice any of the following symptoms:    Severe pain or headache;  Fever or chills; Redness or swelling around the injection site. Loss of bladder or bowel control. You may contact [address] [place], [place] [address]. for any questions or problems that may occur at [phone] during the hours of 9am-5pm Monday-Friday, or the hospital  after hours at [phone], to have the interventional radiologist on call paged. The [facility]  Cardiovascular Special Procedures  General Discharge Instructions    DIETARY INSTRUCTIONS:    ____ Drink extra fluids over the next 24 hours (If not contraindicated by illness or by                   physician order)  ____ Start with clear liquids and progress to normal diet as you feel like eating.   If you experience nausea or repeated episodes of vomiting, which persist beyond 12-24 hours, notify your doctor __x__ Resume your previous diet    ACTIVITY INSTRUCTIONS:  ____ See other instructions  ____ No special instructions  ____ Rest for 24 hours    __x__ Up as tolerated  __x__ Increase activity as tolerated  Wound/Dressing Instructions:  ____ See other instructions  __x__ May shower, tomorrow  __x__ Remove bandage within 24 hours  MEDICATION INSTRUCTIONS:  __x__ Continue taking medications as prescribed

## 2023-05-01 ENCOUNTER — OFFICE VISIT (OUTPATIENT)
Dept: PAIN MANAGEMENT | Age: 63
End: 2023-05-01
Payer: COMMERCIAL

## 2023-05-01 VITALS
DIASTOLIC BLOOD PRESSURE: 93 MMHG | SYSTOLIC BLOOD PRESSURE: 145 MMHG | HEART RATE: 85 BPM | WEIGHT: 211 LBS | BODY MASS INDEX: 27.09 KG/M2

## 2023-05-01 DIAGNOSIS — M79.7 FIBROMYALGIA: ICD-10-CM

## 2023-05-01 DIAGNOSIS — M96.1 FAILED NECK SYNDROME: ICD-10-CM

## 2023-05-01 DIAGNOSIS — F39 MOOD DISORDER (HCC): ICD-10-CM

## 2023-05-01 DIAGNOSIS — M51.36 DDD (DEGENERATIVE DISC DISEASE), LUMBAR: ICD-10-CM

## 2023-05-01 DIAGNOSIS — G89.4 CHRONIC PAIN SYNDROME: ICD-10-CM

## 2023-05-01 DIAGNOSIS — M47.816 LUMBAR SPONDYLOSIS: ICD-10-CM

## 2023-05-01 DIAGNOSIS — M96.1 POSTLAMINECTOMY SYNDROME, LUMBAR REGION: ICD-10-CM

## 2023-05-01 DIAGNOSIS — F51.01 PRIMARY INSOMNIA: ICD-10-CM

## 2023-05-01 DIAGNOSIS — M54.12 BRACHIAL NEURITIS OR RADICULITIS: ICD-10-CM

## 2023-05-01 DIAGNOSIS — M54.16 LUMBAR RADICULITIS: ICD-10-CM

## 2023-05-01 PROCEDURE — 99214 OFFICE O/P EST MOD 30 MIN: CPT | Performed by: INTERNAL MEDICINE

## 2023-05-01 RX ORDER — OXYCODONE AND ACETAMINOPHEN 7.5; 325 MG/1; MG/1
1 TABLET ORAL EVERY 6 HOURS PRN
Qty: 120 TABLET | Refills: 0 | Status: SHIPPED | OUTPATIENT
Start: 2023-05-01 | End: 2023-05-31

## 2023-05-01 RX ORDER — ZOLPIDEM TARTRATE 10 MG/1
10 TABLET ORAL NIGHTLY PRN
Qty: 30 TABLET | Refills: 1 | Status: SHIPPED | OUTPATIENT
Start: 2023-05-01 | End: 2023-05-31

## 2023-05-01 RX ORDER — DICLOFENAC SODIUM 75 MG/1
75 TABLET, DELAYED RELEASE ORAL 2 TIMES DAILY PRN
Qty: 60 TABLET | Refills: 1 | Status: SHIPPED | OUTPATIENT
Start: 2023-05-01

## 2023-05-01 NOTE — PROGRESS NOTES
Sergey Sneed  1960  [id number]    HISTORY OF PRESENT ILLNESS:  Mr. Joana Carter is a 65 y.o. male returns for a follow up visit for multiple medical problems. His  presenting problems are   1. Chronic pain syndrome    2. DDD (degenerative disc disease), lumbar    3. Postlaminectomy syndrome, lumbar region    4. Failed neck syndrome    5. Mood disorder ([address]    6. Primary insomnia    7. Fibromyalgia    8. Lumbar spondylosis    9. Lumbar radiculitis    10. Brachial neuritis or radiculitis    . As per information/history obtained from the PADT(patient assessment and documentation tool) -  He complains of pain in the neck, upper back, and lower back with radiation to the buttocks, hips Right, upper leg Right, knees Right, lower leg Right, ankles Right, and feet Right He rates the pain 5/10 and describes it as sharp, aching, burning. Pain is made worse by: movement, walking, standing, sitting, bending, lifting. Current treatment regimen has helped relieve about 100% of the pain. He denies side effects from the current pain regimen. Patient reports that since the last follow up visit the physical functioning is unchanged, family/social relationships are unchanged, mood is unchanged and sleep patterns are worse, and that the overall functioning is worse. Patient denies neurological bowel or bladder. Patient denies misusing/abusing his narcotic pain medications or using any illegal drugs. There are No indicators for possible drug abuse, addiction or diversion problems. Upon obtaining the medical history from Mr. Joana Carter regarding today's office visit for his presenting problems, patient states he has been doing fair. Mr. Joana Carter states his back has been hurting more. He states he wants to get another CARMEN, he stated the last one was 6 weeks ago, he states it is still helping some. He mentions he is using Percocet 3 per day. Patient reports he is working full time still. Patient states he sleeps well.  Has

## 2023-06-01 ENCOUNTER — OFFICE VISIT (OUTPATIENT)
Dept: PAIN MANAGEMENT | Age: 63
End: 2023-06-01
Payer: COMMERCIAL

## 2023-06-01 VITALS
HEART RATE: 86 BPM | DIASTOLIC BLOOD PRESSURE: 96 MMHG | WEIGHT: 209 LBS | BODY MASS INDEX: 26.83 KG/M2 | SYSTOLIC BLOOD PRESSURE: 141 MMHG

## 2023-06-01 DIAGNOSIS — M47.816 LUMBAR SPONDYLOSIS: ICD-10-CM

## 2023-06-01 DIAGNOSIS — M96.1 POSTLAMINECTOMY SYNDROME, LUMBAR REGION: ICD-10-CM

## 2023-06-01 DIAGNOSIS — M54.16 LUMBAR RADICULITIS: ICD-10-CM

## 2023-06-01 DIAGNOSIS — M79.7 FIBROMYALGIA: ICD-10-CM

## 2023-06-01 DIAGNOSIS — M96.1 FAILED NECK SYNDROME: ICD-10-CM

## 2023-06-01 DIAGNOSIS — M54.12 BRACHIAL NEURITIS OR RADICULITIS: ICD-10-CM

## 2023-06-01 DIAGNOSIS — F51.01 PRIMARY INSOMNIA: ICD-10-CM

## 2023-06-01 DIAGNOSIS — M51.36 DDD (DEGENERATIVE DISC DISEASE), LUMBAR: ICD-10-CM

## 2023-06-01 DIAGNOSIS — F39 MOOD DISORDER (HCC): ICD-10-CM

## 2023-06-01 DIAGNOSIS — G89.4 CHRONIC PAIN SYNDROME: ICD-10-CM

## 2023-06-01 PROCEDURE — 99214 OFFICE O/P EST MOD 30 MIN: CPT | Performed by: INTERNAL MEDICINE

## 2023-06-01 RX ORDER — OXYCODONE AND ACETAMINOPHEN 7.5; 325 MG/1; MG/1
1 TABLET ORAL EVERY 6 HOURS PRN
Qty: 120 TABLET | Refills: 0 | Status: SHIPPED | OUTPATIENT
Start: 2023-06-01 | End: 2023-07-01

## 2023-06-01 RX ORDER — ZOLPIDEM TARTRATE 10 MG/1
10 TABLET ORAL NIGHTLY PRN
Qty: 30 TABLET | Refills: 1 | Status: SHIPPED | OUTPATIENT
Start: 2023-06-01 | End: 2023-07-01

## 2023-06-01 RX ORDER — METHOCARBAMOL 500 MG/1
500 TABLET, FILM COATED ORAL 2 TIMES DAILY
Qty: 14 TABLET | Refills: 0 | Status: SHIPPED | OUTPATIENT
Start: 2023-06-01 | End: 2023-06-08

## 2023-06-01 RX ORDER — DICLOFENAC SODIUM 75 MG/1
75 TABLET, DELAYED RELEASE ORAL 2 TIMES DAILY PRN
Qty: 60 TABLET | Refills: 1 | Status: SHIPPED | OUTPATIENT
Start: 2023-06-01

## 2023-06-29 ENCOUNTER — OFFICE VISIT (OUTPATIENT)
Dept: PAIN MANAGEMENT | Age: 63
End: 2023-06-29
Payer: COMMERCIAL

## 2023-06-29 VITALS
DIASTOLIC BLOOD PRESSURE: 89 MMHG | WEIGHT: 205 LBS | SYSTOLIC BLOOD PRESSURE: 139 MMHG | BODY MASS INDEX: 26.32 KG/M2 | HEART RATE: 76 BPM

## 2023-06-29 DIAGNOSIS — M54.16 LUMBAR RADICULITIS: ICD-10-CM

## 2023-06-29 DIAGNOSIS — G89.4 CHRONIC PAIN SYNDROME: ICD-10-CM

## 2023-06-29 DIAGNOSIS — M96.1 POSTLAMINECTOMY SYNDROME, LUMBAR REGION: ICD-10-CM

## 2023-06-29 DIAGNOSIS — M51.36 DDD (DEGENERATIVE DISC DISEASE), LUMBAR: ICD-10-CM

## 2023-06-29 DIAGNOSIS — M79.7 FIBROMYALGIA: ICD-10-CM

## 2023-06-29 DIAGNOSIS — M54.12 BRACHIAL NEURITIS OR RADICULITIS: ICD-10-CM

## 2023-06-29 DIAGNOSIS — M47.816 LUMBAR SPONDYLOSIS: ICD-10-CM

## 2023-06-29 DIAGNOSIS — M96.1 FAILED NECK SYNDROME: ICD-10-CM

## 2023-06-29 PROCEDURE — 99213 OFFICE O/P EST LOW 20 MIN: CPT | Performed by: INTERNAL MEDICINE

## 2023-06-29 RX ORDER — OXYCODONE AND ACETAMINOPHEN 7.5; 325 MG/1; MG/1
1 TABLET ORAL EVERY 6 HOURS PRN
Qty: 120 TABLET | Refills: 0 | Status: SHIPPED | OUTPATIENT
Start: 2023-06-29 | End: 2023-07-29

## 2023-06-29 RX ORDER — ZOLPIDEM TARTRATE 10 MG/1
10 TABLET ORAL NIGHTLY PRN
Qty: 30 TABLET | Refills: 1 | Status: SHIPPED | OUTPATIENT
Start: 2023-06-29 | End: 2023-08-28

## 2023-06-29 RX ORDER — DICLOFENAC SODIUM 75 MG/1
75 TABLET, DELAYED RELEASE ORAL 2 TIMES DAILY PRN
Qty: 60 TABLET | Refills: 1 | Status: SHIPPED | OUTPATIENT
Start: 2023-06-29

## 2023-07-27 ENCOUNTER — OFFICE VISIT (OUTPATIENT)
Dept: PAIN MANAGEMENT | Age: 63
End: 2023-07-27
Payer: COMMERCIAL

## 2023-07-27 VITALS
BODY MASS INDEX: 26.96 KG/M2 | DIASTOLIC BLOOD PRESSURE: 88 MMHG | HEART RATE: 74 BPM | SYSTOLIC BLOOD PRESSURE: 124 MMHG | WEIGHT: 210 LBS

## 2023-07-27 DIAGNOSIS — G89.4 CHRONIC PAIN SYNDROME: ICD-10-CM

## 2023-07-27 DIAGNOSIS — M96.1 FAILED NECK SYNDROME: ICD-10-CM

## 2023-07-27 DIAGNOSIS — M51.36 DDD (DEGENERATIVE DISC DISEASE), LUMBAR: ICD-10-CM

## 2023-07-27 DIAGNOSIS — M54.16 LUMBAR RADICULITIS: ICD-10-CM

## 2023-07-27 DIAGNOSIS — M54.12 BRACHIAL NEURITIS OR RADICULITIS: ICD-10-CM

## 2023-07-27 DIAGNOSIS — M79.7 FIBROMYALGIA: ICD-10-CM

## 2023-07-27 DIAGNOSIS — M47.816 LUMBAR SPONDYLOSIS: ICD-10-CM

## 2023-07-27 DIAGNOSIS — M96.1 POSTLAMINECTOMY SYNDROME, LUMBAR REGION: ICD-10-CM

## 2023-07-27 PROCEDURE — 99213 OFFICE O/P EST LOW 20 MIN: CPT | Performed by: INTERNAL MEDICINE

## 2023-07-27 RX ORDER — ZOLPIDEM TARTRATE 10 MG/1
10 TABLET ORAL NIGHTLY PRN
Qty: 30 TABLET | Refills: 1 | Status: SHIPPED | OUTPATIENT
Start: 2023-07-27 | End: 2023-09-25

## 2023-07-27 RX ORDER — DICLOFENAC SODIUM 75 MG/1
75 TABLET, DELAYED RELEASE ORAL 2 TIMES DAILY PRN
Qty: 60 TABLET | Refills: 1 | Status: SHIPPED | OUTPATIENT
Start: 2023-07-27

## 2023-07-27 RX ORDER — OXYCODONE AND ACETAMINOPHEN 7.5; 325 MG/1; MG/1
1 TABLET ORAL EVERY 6 HOURS PRN
Qty: 120 TABLET | Refills: 0 | Status: SHIPPED | OUTPATIENT
Start: 2023-07-27 | End: 2023-08-03 | Stop reason: SDUPTHER

## 2023-07-27 NOTE — PROGRESS NOTES
MG/0.4ML SOAJ Use as directed 1 Package 0    ALPRAZolam (XANAX) 0.5 MG tablet Take 1 tablet by mouth nightly as needed. No current facility-administered medications for this visit. I will continue his current medication regimen  which is part of the above treatment schedule. It has been helping with Mr. Fern Holt chronic  medical problems which for this visit include:   Diagnoses of Chronic pain syndrome, Failed neck syndrome, Lumbar radiculitis, DDD (degenerative disc disease), lumbar, Fibromyalgia, Postlaminectomy syndrome, lumbar region, Brachial neuritis or radiculitis, and Lumbar spondylosis were pertinent to this visit. Risks and benefits of the medications and other alternative treatments  including no treatment were discussed with the patient. The common side effects of these medications were also explained to the patient. Informed verbal consent was obtained. Goals of current treatment regimen include improvement in pain, restoration of functioning- with focus on improvement in physical performance, general activity, work or disability,emotional distress, health care utilization and  decreased medication consumption. Will continue to monitor progress towards achieving/maintaining therapeutic goals with special emphasis on  1. Improvement in perceived interfernce  of pain with ADL's. Ability to do home exercises independently. Ability to do household chores indoor and/or outdoor work and social and leisure activities. Improve psychosocial and physical functioning. - he is showing progression towards this treatment goal with the current regimen. He was advised against drinking alcohol with the narcotic pain medicines, advised against driving or handling machinery while adjusting the dose of medicines or if having cognitive  issues related to the current medications. Risk of overdose and death, if medicines not taken as prescribed, were also discussed.  If the patient develops new symptoms or

## 2023-08-03 ENCOUNTER — TELEPHONE (OUTPATIENT)
Dept: PAIN MANAGEMENT | Age: 63
End: 2023-08-03

## 2023-08-03 DIAGNOSIS — M96.1 POSTLAMINECTOMY SYNDROME, LUMBAR REGION: ICD-10-CM

## 2023-08-03 DIAGNOSIS — M79.7 FIBROMYALGIA: ICD-10-CM

## 2023-08-03 DIAGNOSIS — M54.12 BRACHIAL NEURITIS OR RADICULITIS: ICD-10-CM

## 2023-08-03 DIAGNOSIS — G89.4 CHRONIC PAIN SYNDROME: ICD-10-CM

## 2023-08-03 DIAGNOSIS — M47.816 LUMBAR SPONDYLOSIS: ICD-10-CM

## 2023-08-03 DIAGNOSIS — M51.36 DDD (DEGENERATIVE DISC DISEASE), LUMBAR: ICD-10-CM

## 2023-08-03 DIAGNOSIS — M96.1 FAILED NECK SYNDROME: ICD-10-CM

## 2023-08-03 DIAGNOSIS — M54.16 LUMBAR RADICULITIS: ICD-10-CM

## 2023-08-03 RX ORDER — OXYCODONE AND ACETAMINOPHEN 7.5; 325 MG/1; MG/1
1 TABLET ORAL EVERY 6 HOURS PRN
Qty: 120 TABLET | Refills: 0 | Status: SHIPPED | OUTPATIENT
Start: 2023-08-03 | End: 2023-08-04 | Stop reason: SDUPTHER

## 2023-08-03 NOTE — TELEPHONE ENCOUNTER
Pt would like to speak to someone on what can be done. I informed pt that he would have to wait until the pharmacy gets the medication in stock.  Pt is concerned about meds           Please advise

## 2023-08-03 NOTE — TELEPHONE ENCOUNTER
Pharmacy called and said they just ran out of percocets.  They said the pt will call with alternative pharmacies           Please advise

## 2023-08-03 NOTE — TELEPHONE ENCOUNTER
Called pharmacy to cancel percocet prescriptions at [facility] in [place]. A new script will be sent to the preferred pharmacy requested.

## 2023-08-03 NOTE — TELEPHONE ENCOUNTER
Pt said his preferred pharmacy doesn't have the percocet in stock.  [facility] would [facility] [place] [address] - [place], [facility] [address]    Please advise

## 2023-08-04 RX ORDER — OXYCODONE AND ACETAMINOPHEN 7.5; 325 MG/1; MG/1
1 TABLET ORAL EVERY 6 HOURS PRN
Qty: 120 TABLET | Refills: 0 | Status: SHIPPED | OUTPATIENT
Start: 2023-08-04 | End: 2023-08-31 | Stop reason: SDUPTHER

## 2023-08-04 NOTE — TELEPHONE ENCOUNTER
Spoke with patient states [facility] had has the medication instock. Called [facility] to confrim they had the medication before its sent and Shelby Ott confirmed they had it.

## 2023-08-24 ENCOUNTER — OFFICE VISIT (OUTPATIENT)
Dept: PAIN MANAGEMENT | Age: 63
End: 2023-08-24
Payer: COMMERCIAL

## 2023-08-24 VITALS
BODY MASS INDEX: 26.96 KG/M2 | OXYGEN SATURATION: 95 % | DIASTOLIC BLOOD PRESSURE: 89 MMHG | WEIGHT: 210 LBS | SYSTOLIC BLOOD PRESSURE: 136 MMHG | HEART RATE: 95 BPM

## 2023-08-24 DIAGNOSIS — Z91.89 AT RISK FOR RESPIRATORY DEPRESSION DUE TO OPIOID: ICD-10-CM

## 2023-08-24 DIAGNOSIS — M96.1 POSTLAMINECTOMY SYNDROME, LUMBAR REGION: ICD-10-CM

## 2023-08-24 DIAGNOSIS — M54.16 LUMBAR RADICULITIS: ICD-10-CM

## 2023-08-24 DIAGNOSIS — M47.816 LUMBAR SPONDYLOSIS: ICD-10-CM

## 2023-08-24 DIAGNOSIS — M96.1 FAILED NECK SYNDROME: ICD-10-CM

## 2023-08-24 DIAGNOSIS — M54.12 BRACHIAL NEURITIS OR RADICULITIS: ICD-10-CM

## 2023-08-24 DIAGNOSIS — M79.7 FIBROMYALGIA: ICD-10-CM

## 2023-08-24 DIAGNOSIS — M51.36 DDD (DEGENERATIVE DISC DISEASE), LUMBAR: ICD-10-CM

## 2023-08-24 DIAGNOSIS — G89.4 CHRONIC PAIN SYNDROME: ICD-10-CM

## 2023-08-24 PROCEDURE — 20553 NJX 1/MLT TRIGGER POINTS 3/>: CPT | Performed by: INTERNAL MEDICINE

## 2023-08-24 PROCEDURE — 99213 OFFICE O/P EST LOW 20 MIN: CPT | Performed by: INTERNAL MEDICINE

## 2023-08-24 RX ORDER — TRIAMCINOLONE ACETONIDE 40 MG/ML
40 INJECTION, SUSPENSION INTRA-ARTICULAR; INTRAMUSCULAR ONCE
Status: COMPLETED | OUTPATIENT
Start: 2023-08-24 | End: 2023-08-24

## 2023-08-24 RX ORDER — OXYCODONE AND ACETAMINOPHEN 7.5; 325 MG/1; MG/1
1 TABLET ORAL EVERY 6 HOURS PRN
Qty: 120 TABLET | Refills: 0 | Status: CANCELLED | OUTPATIENT
Start: 2023-08-24 | End: 2023-09-28

## 2023-08-24 RX ADMIN — TRIAMCINOLONE ACETONIDE 40 MG: 40 INJECTION, SUSPENSION INTRA-ARTICULAR; INTRAMUSCULAR at 12:17

## 2023-08-25 NOTE — PROGRESS NOTES
Ade Charles  1960  [id number]      HISTORY OF PRESENT ILLNESS:  Mr. Alva Sanders is a 59 y.o. male returns for a follow up visit for pain management  He has a diagnosis of   1. Chronic pain syndrome    2. DDD (degenerative disc disease), lumbar    3. Brachial neuritis or radiculitis    4. Primary insomnia    5. Failed neck syndrome    6. Fibromyalgia    7. Lumbar spondylosis    8. Lumbar radiculitis    9. Postlaminectomy syndrome, lumbar region    10. At risk for respiratory depression due to opioid    . As per information/history obtained from the PADT(patient assessment and documentation tool) -  He complains of pain in the neck, upper back, mid back, and lower back with radiation to the shoulders Left, arms Left, elbows Left, hands Left, buttocks, hips Bilateral, upper leg Bilateral, knees Bilateral, lower leg Bilateral, ankles Bilateral, and feet Bilateral He rates the pain 5/10 and describes it as sharp, aching, burning, numbness. Pain is made worse by: movement, walking, standing, sitting, bending, lifting. He denies any side effects from the current pain regimen. Patient reports that since starting the current regimen under my care the physical functioning is unchanged, family/social relationships are unchanged, mood is unchanged sleep patterns are unchanged, and that the overall functioning is unchanged. Patient denies misusing/abusing his narcotic pain medications or using any illegal drugs. There are No indicators for possible drug abuse, addiction or diversion problems. Edouard complains he has been hurting in the neck, \" its very painful\". He mentions he has been using Percocet 4 per day. He denies any constipation. 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 Mr. Alva Sanders reports he is managing his ADL's and house chores and activities.  Has increased tolerance  for sitting , standing and walking by almost . Has increased household chores- laundry s a  , cooking and light

## 2023-08-30 RX ORDER — NALOXONE HYDROCHLORIDE 2 MG/.4ML
INJECTION, SOLUTION INTRAMUSCULAR; SUBCUTANEOUS
Qty: 1 EACH | Refills: 0 | Status: SHIPPED | OUTPATIENT
Start: 2023-08-30

## 2023-08-30 RX ORDER — ZOLPIDEM TARTRATE 10 MG/1
10 TABLET ORAL NIGHTLY PRN
Qty: 30 TABLET | Refills: 1 | Status: SHIPPED | OUTPATIENT
Start: 2023-08-30 | End: 2023-10-29

## 2023-08-30 RX ORDER — DICLOFENAC SODIUM 75 MG/1
75 TABLET, DELAYED RELEASE ORAL 2 TIMES DAILY PRN
Qty: 60 TABLET | Refills: 1 | Status: SHIPPED | OUTPATIENT
Start: 2023-08-30

## 2023-08-31 ENCOUNTER — TELEPHONE (OUTPATIENT)
Dept: PAIN MANAGEMENT | Age: 63
End: 2023-08-31

## 2023-08-31 DIAGNOSIS — M96.1 POSTLAMINECTOMY SYNDROME, LUMBAR REGION: ICD-10-CM

## 2023-08-31 DIAGNOSIS — M96.1 FAILED NECK SYNDROME: ICD-10-CM

## 2023-08-31 DIAGNOSIS — M51.36 DDD (DEGENERATIVE DISC DISEASE), LUMBAR: ICD-10-CM

## 2023-08-31 DIAGNOSIS — G89.4 CHRONIC PAIN SYNDROME: ICD-10-CM

## 2023-08-31 DIAGNOSIS — M47.816 LUMBAR SPONDYLOSIS: ICD-10-CM

## 2023-08-31 DIAGNOSIS — M54.12 BRACHIAL NEURITIS OR RADICULITIS: ICD-10-CM

## 2023-08-31 DIAGNOSIS — M54.16 LUMBAR RADICULITIS: ICD-10-CM

## 2023-08-31 DIAGNOSIS — M79.7 FIBROMYALGIA: ICD-10-CM

## 2023-08-31 RX ORDER — OXYCODONE AND ACETAMINOPHEN 7.5; 325 MG/1; MG/1
1 TABLET ORAL EVERY 6 HOURS PRN
Qty: 120 TABLET | Refills: 0 | Status: SHIPPED | OUTPATIENT
Start: 2023-08-31 | End: 2023-09-28 | Stop reason: SDUPTHER

## 2023-08-31 NOTE — TELEPHONE ENCOUNTER
Called patient to inform him that his percocet's prescription has been sent to his preferred pharmacy.

## 2023-08-31 NOTE — TELEPHONE ENCOUNTER
Patient called and stated he needs his Percocet medication sent to [facility] [phone] [facility] [id number], [address] [place] [address] N - F [phone]

## 2023-09-28 ENCOUNTER — OFFICE VISIT (OUTPATIENT)
Dept: PAIN MANAGEMENT | Age: 63
End: 2023-09-28
Payer: COMMERCIAL

## 2023-09-28 VITALS
WEIGHT: 202 LBS | OXYGEN SATURATION: 93 % | SYSTOLIC BLOOD PRESSURE: 148 MMHG | DIASTOLIC BLOOD PRESSURE: 91 MMHG | BODY MASS INDEX: 25.94 KG/M2 | HEART RATE: 84 BPM

## 2023-09-28 DIAGNOSIS — M47.816 LUMBAR SPONDYLOSIS: ICD-10-CM

## 2023-09-28 DIAGNOSIS — M79.7 FIBROMYALGIA: ICD-10-CM

## 2023-09-28 DIAGNOSIS — M96.1 POSTLAMINECTOMY SYNDROME, LUMBAR REGION: ICD-10-CM

## 2023-09-28 DIAGNOSIS — G89.4 CHRONIC PAIN SYNDROME: ICD-10-CM

## 2023-09-28 DIAGNOSIS — M54.16 LUMBAR RADICULITIS: ICD-10-CM

## 2023-09-28 DIAGNOSIS — M54.12 BRACHIAL NEURITIS OR RADICULITIS: ICD-10-CM

## 2023-09-28 DIAGNOSIS — M51.36 DDD (DEGENERATIVE DISC DISEASE), LUMBAR: ICD-10-CM

## 2023-09-28 DIAGNOSIS — M96.1 FAILED NECK SYNDROME: ICD-10-CM

## 2023-09-28 PROCEDURE — 99213 OFFICE O/P EST LOW 20 MIN: CPT | Performed by: INTERNAL MEDICINE

## 2023-09-28 RX ORDER — DICLOFENAC SODIUM 75 MG/1
75 TABLET, DELAYED RELEASE ORAL 2 TIMES DAILY PRN
Qty: 60 TABLET | Refills: 1 | Status: SHIPPED | OUTPATIENT
Start: 2023-09-28

## 2023-09-28 RX ORDER — ZOLPIDEM TARTRATE 10 MG/1
10 TABLET ORAL NIGHTLY PRN
Qty: 30 TABLET | Refills: 1 | Status: SHIPPED | OUTPATIENT
Start: 2023-09-28 | End: 2023-11-27

## 2023-09-28 RX ORDER — OXYCODONE AND ACETAMINOPHEN 7.5; 325 MG/1; MG/1
1 TABLET ORAL EVERY 6 HOURS PRN
Qty: 120 TABLET | Refills: 0 | Status: SHIPPED | OUTPATIENT
Start: 2023-09-28 | End: 2023-10-28

## 2023-10-13 ENCOUNTER — HOSPITAL ENCOUNTER (OUTPATIENT)
Dept: INTERVENTIONAL RADIOLOGY/VASCULAR | Age: 63
Discharge: HOME | End: 2023-10-13
Payer: COMMERCIAL

## 2023-10-13 DIAGNOSIS — M54.16 LUMBAR RADICULOPATHY: ICD-10-CM

## 2023-10-13 PROCEDURE — 2500000003 HC RX 250 WO HCPCS

## 2023-10-13 PROCEDURE — 62323 NJX INTERLAMINAR LMBR/SAC: CPT

## 2023-10-13 PROCEDURE — 6360000002 HC RX W HCPCS

## 2023-10-13 NOTE — DISCHARGE INSTRUCTIONS
Resume your previous diet    ACTIVITY INSTRUCTIONS:    ____ See other instructions  ____ No special instructions  ____ Rest for 24 hours    __x__ Up as tolerated  __x__ Increase activity as tolerated    Wound/Dressing Instructions:  ____ See other instructions  __x__ May shower, tomorrow  __x__ Remove bandage within 24 hours    The Discharge Instructions have been explained to me. I understand and can verbalize these instructions.

## 2023-10-16 ENCOUNTER — TELEPHONE (OUTPATIENT)
Dept: PAIN MANAGEMENT | Age: 63
End: 2023-10-16

## 2023-10-16 DIAGNOSIS — G89.4 CHRONIC PAIN SYNDROME: ICD-10-CM

## 2023-10-16 DIAGNOSIS — M96.1 FAILED NECK SYNDROME: ICD-10-CM

## 2023-10-16 NOTE — TELEPHONE ENCOUNTER
Patient called stating that the [facility] doctor that Jaycob Castillo referred him to said an CARMEN for his neck can also be done. All they need is an order for the neck CARMEN & a recent MRI.

## 2023-10-25 ENCOUNTER — HOSPITAL ENCOUNTER (OUTPATIENT)
Dept: INTERVENTIONAL RADIOLOGY/VASCULAR | Age: 63
Discharge: HOME | End: 2023-10-25
Payer: COMMERCIAL

## 2023-10-25 DIAGNOSIS — M50.30 ANNULAR TEAR OF CERVICAL DISC: ICD-10-CM

## 2023-10-25 PROCEDURE — 2500000003 HC RX 250 WO HCPCS

## 2023-10-25 PROCEDURE — 2709999900 HC NON-CHARGEABLE SUPPLY

## 2023-10-25 PROCEDURE — 6360000004 HC RX CONTRAST MEDICATION: Performed by: RADIOLOGY

## 2023-10-25 PROCEDURE — 62321 NJX INTERLAMINAR CRV/THRC: CPT

## 2023-10-25 PROCEDURE — 6360000002 HC RX W HCPCS

## 2023-10-25 RX ADMIN — IOHEXOL 2 ML: 180 INJECTION INTRAVENOUS at 13:30

## 2023-10-26 ENCOUNTER — OFFICE VISIT (OUTPATIENT)
Dept: PAIN MANAGEMENT | Age: 63
End: 2023-10-26
Payer: COMMERCIAL

## 2023-10-26 VITALS
OXYGEN SATURATION: 94 % | WEIGHT: 202 LBS | HEART RATE: 109 BPM | BODY MASS INDEX: 25.94 KG/M2 | SYSTOLIC BLOOD PRESSURE: 136 MMHG | DIASTOLIC BLOOD PRESSURE: 87 MMHG

## 2023-10-26 DIAGNOSIS — M96.1 POSTLAMINECTOMY SYNDROME, LUMBAR REGION: ICD-10-CM

## 2023-10-26 DIAGNOSIS — M51.36 DDD (DEGENERATIVE DISC DISEASE), LUMBAR: ICD-10-CM

## 2023-10-26 DIAGNOSIS — M54.16 LUMBAR RADICULITIS: ICD-10-CM

## 2023-10-26 DIAGNOSIS — M79.7 FIBROMYALGIA: ICD-10-CM

## 2023-10-26 DIAGNOSIS — M96.1 FAILED NECK SYNDROME: ICD-10-CM

## 2023-10-26 DIAGNOSIS — M54.12 BRACHIAL NEURITIS OR RADICULITIS: ICD-10-CM

## 2023-10-26 DIAGNOSIS — M47.816 LUMBAR SPONDYLOSIS: ICD-10-CM

## 2023-10-26 DIAGNOSIS — G89.4 CHRONIC PAIN SYNDROME: ICD-10-CM

## 2023-10-26 PROCEDURE — 99213 OFFICE O/P EST LOW 20 MIN: CPT | Performed by: INTERNAL MEDICINE

## 2023-10-26 RX ORDER — DICLOFENAC SODIUM 75 MG/1
75 TABLET, DELAYED RELEASE ORAL 2 TIMES DAILY PRN
Qty: 60 TABLET | Refills: 1 | Status: SHIPPED | OUTPATIENT
Start: 2023-10-26

## 2023-10-26 RX ORDER — ZOLPIDEM TARTRATE 10 MG/1
10 TABLET ORAL NIGHTLY PRN
Qty: 30 TABLET | Refills: 1 | Status: SHIPPED | OUTPATIENT
Start: 2023-10-26 | End: 2023-12-25

## 2023-10-26 RX ORDER — OXYCODONE AND ACETAMINOPHEN 7.5; 325 MG/1; MG/1
1 TABLET ORAL EVERY 6 HOURS PRN
Qty: 120 TABLET | Refills: 0 | Status: SHIPPED | OUTPATIENT
Start: 2023-10-26 | End: 2023-11-25

## 2023-11-27 ENCOUNTER — OFFICE VISIT (OUTPATIENT)
Dept: PAIN MANAGEMENT | Age: 63
End: 2023-11-27

## 2023-11-27 VITALS
DIASTOLIC BLOOD PRESSURE: 106 MMHG | BODY MASS INDEX: 27.09 KG/M2 | OXYGEN SATURATION: 94 % | WEIGHT: 211 LBS | HEART RATE: 105 BPM | SYSTOLIC BLOOD PRESSURE: 185 MMHG

## 2023-11-27 DIAGNOSIS — M54.16 LUMBAR RADICULITIS: ICD-10-CM

## 2023-11-27 DIAGNOSIS — M51.36 DDD (DEGENERATIVE DISC DISEASE), LUMBAR: ICD-10-CM

## 2023-11-27 DIAGNOSIS — M96.1 FAILED NECK SYNDROME: ICD-10-CM

## 2023-11-27 DIAGNOSIS — M96.1 POSTLAMINECTOMY SYNDROME, LUMBAR REGION: ICD-10-CM

## 2023-11-27 DIAGNOSIS — G89.4 CHRONIC PAIN SYNDROME: ICD-10-CM

## 2023-11-27 DIAGNOSIS — M54.12 BRACHIAL NEURITIS OR RADICULITIS: ICD-10-CM

## 2023-11-27 DIAGNOSIS — M79.7 FIBROMYALGIA: ICD-10-CM

## 2023-11-27 DIAGNOSIS — M47.816 LUMBAR SPONDYLOSIS: ICD-10-CM

## 2023-11-27 RX ORDER — OXYCODONE AND ACETAMINOPHEN 7.5; 325 MG/1; MG/1
1 TABLET ORAL EVERY 6 HOURS PRN
Qty: 120 TABLET | Refills: 0 | Status: SHIPPED | OUTPATIENT
Start: 2023-11-27 | End: 2023-12-27

## 2023-11-27 RX ORDER — ZOLPIDEM TARTRATE 10 MG/1
10 TABLET ORAL NIGHTLY PRN
Qty: 30 TABLET | Refills: 1 | Status: SHIPPED | OUTPATIENT
Start: 2023-11-27 | End: 2024-01-26

## 2023-12-22 ENCOUNTER — OFFICE VISIT (OUTPATIENT)
Dept: PAIN MANAGEMENT | Age: 63
End: 2023-12-22
Payer: COMMERCIAL

## 2023-12-22 VITALS
HEART RATE: 85 BPM | BODY MASS INDEX: 26.83 KG/M2 | SYSTOLIC BLOOD PRESSURE: 151 MMHG | DIASTOLIC BLOOD PRESSURE: 89 MMHG | OXYGEN SATURATION: 94 % | WEIGHT: 209 LBS

## 2023-12-22 DIAGNOSIS — M47.816 LUMBAR SPONDYLOSIS: ICD-10-CM

## 2023-12-22 DIAGNOSIS — M96.1 FAILED NECK SYNDROME: ICD-10-CM

## 2023-12-22 DIAGNOSIS — M54.12 BRACHIAL NEURITIS OR RADICULITIS: ICD-10-CM

## 2023-12-22 DIAGNOSIS — M79.7 FIBROMYALGIA: ICD-10-CM

## 2023-12-22 DIAGNOSIS — M51.36 DDD (DEGENERATIVE DISC DISEASE), LUMBAR: ICD-10-CM

## 2023-12-22 DIAGNOSIS — M96.1 POSTLAMINECTOMY SYNDROME, LUMBAR REGION: ICD-10-CM

## 2023-12-22 DIAGNOSIS — M54.16 LUMBAR RADICULITIS: ICD-10-CM

## 2023-12-22 DIAGNOSIS — G89.4 CHRONIC PAIN SYNDROME: ICD-10-CM

## 2023-12-22 PROCEDURE — 99213 OFFICE O/P EST LOW 20 MIN: CPT | Performed by: INTERNAL MEDICINE

## 2023-12-22 RX ORDER — OXYCODONE AND ACETAMINOPHEN 7.5; 325 MG/1; MG/1
1 TABLET ORAL EVERY 6 HOURS PRN
Qty: 120 TABLET | Refills: 0 | Status: SHIPPED | OUTPATIENT
Start: 2023-12-22 | End: 2023-12-26 | Stop reason: SDUPTHER

## 2023-12-22 RX ORDER — DICLOFENAC SODIUM 75 MG/1
75 TABLET, DELAYED RELEASE ORAL 2 TIMES DAILY PRN
Qty: 60 TABLET | Refills: 1 | Status: SHIPPED | OUTPATIENT
Start: 2023-12-22

## 2023-12-22 RX ORDER — ZOLPIDEM TARTRATE 10 MG/1
10 TABLET ORAL NIGHTLY PRN
Qty: 30 TABLET | Refills: 1 | Status: SHIPPED | OUTPATIENT
Start: 2023-12-22 | End: 2024-02-20

## 2023-12-22 NOTE — PROGRESS NOTES
[facility]  1960  [id number]      HISTORY OF PRESENT ILLNESS:  Mr. Romulo Amos is a 59 y.o. male returns for a follow up visit for pain management  He has a diagnosis of   1. Chronic pain syndrome    2. DDD (degenerative disc disease), lumbar    3. Lumbar spondylosis    4. Brachial neuritis or radiculitis    5. Lumbar radiculitis    6. Failed neck syndrome    7. Postlaminectomy syndrome, lumbar region    8. Fibromyalgia    9. Primary insomnia    10. Mood disorder ([address])    . As per information/history obtained from the PADT(patient assessment and documentation tool) -  He complains of pain in the neck, upper back, mid back, and lower back with radiation to the ankles Bilateral and feet Bilateral He rates the pain 5/10 and describes it as sharp, aching, burning. Pain is made worse by: movement, walking, standing, sitting, bending, lifting. He denies any side effects from the current pain regimen. Patient reports that since last follow up visit the physical functioning is better, family/social relationships are unchanged, mood is unchanged sleep patterns are unchanged. Mr. Romulo Amos states that since starting the treatment with the current regimen the  overall functioning  in the above aspects is  better, Patient denies misusing/abusing his narcotic pain medications or using any illegal drugs. There are No indicators for possible drug abuse, addiction or diversion problems. Upon obtaining the medical history from Mr. Romulo Amos regarding today's office visit for his presenting problems, Patient reports he has been doing fair, managing with the medications. He says he is using Percocet 4 per day along with other adjuvants. He states he is working full time      ALLERGIES: Patients list of allergies were reviewed     MEDICATIONS: Mr. Romulo Amos list of medications were reviewed. His current medications are   Outpatient Medications Prior to Visit   Medication Sig Dispense Refill    zolpidem (AMBIEN) 10 MG

## 2023-12-26 ENCOUNTER — TELEPHONE (OUTPATIENT)
Dept: PAIN MANAGEMENT | Age: 63
End: 2023-12-26

## 2023-12-26 DIAGNOSIS — G89.4 CHRONIC PAIN SYNDROME: ICD-10-CM

## 2023-12-26 DIAGNOSIS — M96.1 POSTLAMINECTOMY SYNDROME, LUMBAR REGION: ICD-10-CM

## 2023-12-26 DIAGNOSIS — M96.1 FAILED NECK SYNDROME: ICD-10-CM

## 2023-12-26 DIAGNOSIS — M79.7 FIBROMYALGIA: ICD-10-CM

## 2023-12-26 DIAGNOSIS — M54.12 BRACHIAL NEURITIS OR RADICULITIS: ICD-10-CM

## 2023-12-26 DIAGNOSIS — M51.36 DDD (DEGENERATIVE DISC DISEASE), LUMBAR: ICD-10-CM

## 2023-12-26 DIAGNOSIS — M47.816 LUMBAR SPONDYLOSIS: ICD-10-CM

## 2023-12-26 DIAGNOSIS — M54.16 LUMBAR RADICULITIS: ICD-10-CM

## 2023-12-26 RX ORDER — OXYCODONE AND ACETAMINOPHEN 7.5; 325 MG/1; MG/1
1 TABLET ORAL EVERY 6 HOURS PRN
Qty: 96 TABLET | Refills: 0 | Status: SHIPPED | OUTPATIENT
Start: 2023-12-26 | End: 2024-01-19 | Stop reason: SDUPTHER

## 2023-12-26 NOTE — TELEPHONE ENCOUNTER
Resend medication notification.    Who is requesting the change? The pt    Reason why medication needs to be sent:  original pharmacy is out of stock     Medication Name:  oxyCODONE-acetaminophen (PERCOCET) 7.5-325 MG     [facility] pharmacy:  [facility] [address] [place] [address]       Pharmacy #:  [phone]

## 2023-12-26 NOTE — TELEPHONE ENCOUNTER
Per [facility] call in for the number of days     Called and cancel medication at [facility]     medication is pending for 25 days to requested pharmacy

## 2024-01-19 ENCOUNTER — OFFICE VISIT (OUTPATIENT)
Dept: PAIN MANAGEMENT | Age: 64
End: 2024-01-19
Payer: COMMERCIAL

## 2024-01-19 VITALS
OXYGEN SATURATION: 96 % | BODY MASS INDEX: 26.96 KG/M2 | DIASTOLIC BLOOD PRESSURE: 101 MMHG | HEART RATE: 90 BPM | SYSTOLIC BLOOD PRESSURE: 153 MMHG | WEIGHT: 210 LBS

## 2024-01-19 DIAGNOSIS — M51.36 DDD (DEGENERATIVE DISC DISEASE), LUMBAR: ICD-10-CM

## 2024-01-19 DIAGNOSIS — M47.816 LUMBAR SPONDYLOSIS: ICD-10-CM

## 2024-01-19 DIAGNOSIS — M54.16 LUMBAR RADICULITIS: ICD-10-CM

## 2024-01-19 DIAGNOSIS — M96.1 FAILED NECK SYNDROME: ICD-10-CM

## 2024-01-19 DIAGNOSIS — M96.1 POSTLAMINECTOMY SYNDROME, LUMBAR REGION: ICD-10-CM

## 2024-01-19 DIAGNOSIS — M79.7 FIBROMYALGIA: ICD-10-CM

## 2024-01-19 DIAGNOSIS — G89.4 CHRONIC PAIN SYNDROME: ICD-10-CM

## 2024-01-19 PROCEDURE — G8419 CALC BMI OUT NRM PARAM NOF/U: HCPCS | Performed by: INTERNAL MEDICINE

## 2024-01-19 PROCEDURE — G8484 FLU IMMUNIZE NO ADMIN: HCPCS | Performed by: INTERNAL MEDICINE

## 2024-01-19 PROCEDURE — 99213 OFFICE O/P EST LOW 20 MIN: CPT | Performed by: INTERNAL MEDICINE

## 2024-01-19 PROCEDURE — 3017F COLORECTAL CA SCREEN DOC REV: CPT | Performed by: INTERNAL MEDICINE

## 2024-01-19 PROCEDURE — 1036F TOBACCO NON-USER: CPT | Performed by: INTERNAL MEDICINE

## 2024-01-19 PROCEDURE — G8427 DOCREV CUR MEDS BY ELIG CLIN: HCPCS | Performed by: INTERNAL MEDICINE

## 2024-01-19 RX ORDER — ZOLPIDEM TARTRATE 10 MG/1
10 TABLET ORAL NIGHTLY PRN
Qty: 30 TABLET | Refills: 1 | Status: SHIPPED | OUTPATIENT
Start: 2024-01-19 | End: 2024-03-19

## 2024-01-19 RX ORDER — OXYCODONE AND ACETAMINOPHEN 7.5; 325 MG/1; MG/1
1 TABLET ORAL EVERY 6 HOURS PRN
Qty: 120 TABLET | Refills: 0 | Status: SHIPPED | OUTPATIENT
Start: 2024-01-19 | End: 2024-02-18

## 2024-01-19 RX ORDER — DICLOFENAC SODIUM 75 MG/1
75 TABLET, DELAYED RELEASE ORAL 2 TIMES DAILY PRN
Qty: 60 TABLET | Refills: 1 | Status: SHIPPED | OUTPATIENT
Start: 2024-01-19

## 2024-01-19 NOTE — PROGRESS NOTES
lower extremity symptoms.  Ability to lift up to 10-20 lbs. Ability to go up and down stairs.  Sit 30-60 minutes  Without having to stand up frequently. - he is maintaining/progressing towards his work related goals with the current regimen. - he is maintaining his treatment goal with the current regimen.     He was advised against drinking alcohol with the narcotic pain medicines, advised against driving or handling machinery while adjusting the dose of medicines or if having cognitive  issues related to the current medications.Risk of overdose and death, if medicines not taken as prescribed, were also discussed. If the patient develops new symptoms or if the symptoms worsen, the patient should call the office.    While transcribing every attempt was made to maintain the accuracy of the note in terms of it's contents,there may have been some errors made inadvertently.  Thank you for allowing me to participate in the care of this patient.    Artemio Hoover MD.    Cc: Ricardo Cárdenas MD

## 2024-02-16 ENCOUNTER — OFFICE VISIT (OUTPATIENT)
Dept: PAIN MANAGEMENT | Age: 64
End: 2024-02-16
Payer: COMMERCIAL

## 2024-02-16 VITALS
HEART RATE: 89 BPM | DIASTOLIC BLOOD PRESSURE: 95 MMHG | SYSTOLIC BLOOD PRESSURE: 157 MMHG | BODY MASS INDEX: 27.22 KG/M2 | OXYGEN SATURATION: 95 % | WEIGHT: 212 LBS

## 2024-02-16 DIAGNOSIS — F39 MOOD DISORDER (HCC): ICD-10-CM

## 2024-02-16 DIAGNOSIS — M96.1 FAILED NECK SYNDROME: ICD-10-CM

## 2024-02-16 DIAGNOSIS — M47.816 LUMBAR SPONDYLOSIS: ICD-10-CM

## 2024-02-16 DIAGNOSIS — M54.16 LUMBAR RADICULITIS: ICD-10-CM

## 2024-02-16 DIAGNOSIS — M79.7 FIBROMYALGIA: ICD-10-CM

## 2024-02-16 DIAGNOSIS — M96.1 POSTLAMINECTOMY SYNDROME, LUMBAR REGION: ICD-10-CM

## 2024-02-16 DIAGNOSIS — M54.12 BRACHIAL NEURITIS OR RADICULITIS: ICD-10-CM

## 2024-02-16 DIAGNOSIS — M51.36 DDD (DEGENERATIVE DISC DISEASE), LUMBAR: ICD-10-CM

## 2024-02-16 DIAGNOSIS — G89.4 CHRONIC PAIN SYNDROME: ICD-10-CM

## 2024-02-16 DIAGNOSIS — F51.01 PRIMARY INSOMNIA: ICD-10-CM

## 2024-02-16 PROCEDURE — 3017F COLORECTAL CA SCREEN DOC REV: CPT | Performed by: INTERNAL MEDICINE

## 2024-02-16 PROCEDURE — G8427 DOCREV CUR MEDS BY ELIG CLIN: HCPCS | Performed by: INTERNAL MEDICINE

## 2024-02-16 PROCEDURE — 99214 OFFICE O/P EST MOD 30 MIN: CPT | Performed by: INTERNAL MEDICINE

## 2024-02-16 PROCEDURE — G8419 CALC BMI OUT NRM PARAM NOF/U: HCPCS | Performed by: INTERNAL MEDICINE

## 2024-02-16 PROCEDURE — 1036F TOBACCO NON-USER: CPT | Performed by: INTERNAL MEDICINE

## 2024-02-16 PROCEDURE — G8484 FLU IMMUNIZE NO ADMIN: HCPCS | Performed by: INTERNAL MEDICINE

## 2024-02-16 RX ORDER — OXYCODONE AND ACETAMINOPHEN 7.5; 325 MG/1; MG/1
1 TABLET ORAL EVERY 6 HOURS PRN
Qty: 120 TABLET | Refills: 0 | Status: SHIPPED | OUTPATIENT
Start: 2024-02-16 | End: 2024-03-17

## 2024-02-16 RX ORDER — ZOLPIDEM TARTRATE 10 MG/1
10 TABLET ORAL NIGHTLY PRN
Qty: 30 TABLET | Refills: 1 | Status: SHIPPED | OUTPATIENT
Start: 2024-02-16 | End: 2024-04-16

## 2024-02-16 RX ORDER — METHYLPREDNISOLONE 4 MG/1
TABLET ORAL
Qty: 1 KIT | Refills: 0 | Status: SHIPPED | OUTPATIENT
Start: 2024-02-16

## 2024-02-16 RX ORDER — DICLOFENAC SODIUM 75 MG/1
75 TABLET, DELAYED RELEASE ORAL 2 TIMES DAILY PRN
Qty: 60 TABLET | Refills: 1 | Status: SHIPPED | OUTPATIENT
Start: 2024-02-16

## 2024-02-19 NOTE — PROGRESS NOTES
John Chavis  1960  [id number]    HISTORY OF PRESENT ILLNESS:  Mr. Chavis is a 63 y.o. male returns for a follow up visit for multiple medical problems.  His  presenting problems are   1. Chronic pain syndrome    2. Fibromyalgia    3. Failed neck syndrome    4. Mood disorder ([facility])    5. Postlaminectomy syndrome, lumbar region    6. Lumbar spondylosis    7. Brachial neuritis or radiculitis    8. Primary insomnia    9. DDD (degenerative disc disease), lumbar    10. Lumbar radiculitis    .    As per information/history obtained from the PADT(patient assessment and documentation tool) -  He complains of pain in the neck, upper back, mid back, and lower back with radiation to the shoulders Right, buttocks, hips Right, upper leg Right, knees Bilateral, lower leg Bilateral, ankles Bilateral, and feet Bilateral He rates the pain 6/10 and describes it as sharp, aching, numbness.  Pain is made worse by: movement, walking, standing, sitting, bending, lifting.  Current treatment regimen has helped relieve about 40% of the pain.  He denies side effects from the current pain regimen.   Patient reports that since last follow up visit the physical functioning is unchanged, family/social relationships are unchanged, mood is unchanged sleep patterns are unchanged.  Mr. Chavis states that since starting the treatment with the current regimen the  overall functioning  in the above aspects is  unchanged,Patient denies neurological bowel or bladder. Patient denies misusing/abusing his narcotic pain medications or using any illegal drugs.  There are No indicators for possible drug abuse, addiction or diversion problems.     Upon obtaining the medical history from Mr. Chavis regarding today's office visit for his presenting problems, Patient states he as been doing fair, managing with the medications. He says he's using Percocet 4 per day along with other adjuvants. He complains his back is hurting more and legs stay numb.

## 2024-03-15 ENCOUNTER — OFFICE VISIT (OUTPATIENT)
Dept: PAIN MANAGEMENT | Age: 64
End: 2024-03-15
Payer: COMMERCIAL

## 2024-03-15 VITALS
BODY MASS INDEX: 26.45 KG/M2 | DIASTOLIC BLOOD PRESSURE: 79 MMHG | WEIGHT: 206 LBS | OXYGEN SATURATION: 95 % | HEART RATE: 83 BPM | SYSTOLIC BLOOD PRESSURE: 137 MMHG

## 2024-03-15 DIAGNOSIS — M54.16 LUMBAR RADICULITIS: ICD-10-CM

## 2024-03-15 DIAGNOSIS — M47.816 LUMBAR SPONDYLOSIS: ICD-10-CM

## 2024-03-15 DIAGNOSIS — G89.4 CHRONIC PAIN SYNDROME: ICD-10-CM

## 2024-03-15 DIAGNOSIS — M96.1 FAILED NECK SYNDROME: ICD-10-CM

## 2024-03-15 DIAGNOSIS — M51.36 DDD (DEGENERATIVE DISC DISEASE), LUMBAR: ICD-10-CM

## 2024-03-15 DIAGNOSIS — M96.1 POSTLAMINECTOMY SYNDROME, LUMBAR REGION: ICD-10-CM

## 2024-03-15 DIAGNOSIS — M79.7 FIBROMYALGIA: ICD-10-CM

## 2024-03-15 DIAGNOSIS — M54.12 BRACHIAL NEURITIS OR RADICULITIS: ICD-10-CM

## 2024-03-15 PROCEDURE — 3017F COLORECTAL CA SCREEN DOC REV: CPT | Performed by: INTERNAL MEDICINE

## 2024-03-15 PROCEDURE — G8419 CALC BMI OUT NRM PARAM NOF/U: HCPCS | Performed by: INTERNAL MEDICINE

## 2024-03-15 PROCEDURE — G8484 FLU IMMUNIZE NO ADMIN: HCPCS | Performed by: INTERNAL MEDICINE

## 2024-03-15 PROCEDURE — G8427 DOCREV CUR MEDS BY ELIG CLIN: HCPCS | Performed by: INTERNAL MEDICINE

## 2024-03-15 PROCEDURE — 99213 OFFICE O/P EST LOW 20 MIN: CPT | Performed by: INTERNAL MEDICINE

## 2024-03-15 PROCEDURE — 1036F TOBACCO NON-USER: CPT | Performed by: INTERNAL MEDICINE

## 2024-03-18 ENCOUNTER — TELEPHONE (OUTPATIENT)
Dept: PAIN MANAGEMENT | Age: 64
End: 2024-03-18

## 2024-03-18 RX ORDER — ZOLPIDEM TARTRATE 10 MG/1
10 TABLET ORAL NIGHTLY PRN
Qty: 30 TABLET | Refills: 0 | Status: SHIPPED | OUTPATIENT
Start: 2024-03-18 | End: 2024-05-17

## 2024-03-18 RX ORDER — DICLOFENAC SODIUM 75 MG/1
75 TABLET, DELAYED RELEASE ORAL 2 TIMES DAILY PRN
Qty: 60 TABLET | Refills: 1 | Status: SHIPPED | OUTPATIENT
Start: 2024-03-18

## 2024-03-18 RX ORDER — OXYCODONE AND ACETAMINOPHEN 7.5; 325 MG/1; MG/1
1 TABLET ORAL EVERY 6 HOURS PRN
Qty: 120 TABLET | Refills: 0 | Status: SHIPPED | OUTPATIENT
Start: 2024-03-18 | End: 2024-04-17

## 2024-03-18 NOTE — TELEPHONE ENCOUNTER
Called patient to inform him that his prescriptions has been sent over to his preferred pharmacy requested.

## 2024-03-19 NOTE — PROGRESS NOTES
120 tablet 0    zolpidem (AMBIEN) 10 MG tablet Take 1 tablet by mouth nightly as needed for Sleep for up to 60 days. 30 tablet 0    methylPREDNISolone (MEDROL, ARYA,) 4 MG tablet Use as directed 1 kit 0    Naloxone HCl (EVZIO) 2 MG/0.4ML SOAJ Use as directed 1 each 0     No current facility-administered medications for this visit.       General Goals of current treatment regimen include improvement in pain, restoration of functioning- with focus on improvement in physical performance, general activity, work or disability,emotional distress, health care utilization and  decreased medication consumption.   Will continue to monitor progress towards achieving/maintaining therapeutic goals with special emphasis on  1. Improvement in perceived interfernce  of pain with ADL's. Ability to do home exercises independently. Ability to do household chores indoor and/or outdoor work and social and leisure activities.Improve psychosocial and physical functioning. - he is maintaining his treatment goal with the current regimen.      2. Ability to focus/concentrate at work and perform the duties required of him at work  Sit through a workday without lower extremity symptoms.  Stand 30-60 minutes without lower extremity symptoms.  Ability to lift up to 10-20 lbs. Ability to go up and down stairs.  Sit 30-60 minutes  Without having to stand up frequently. - he is maintaining/progressing towards his work related goals with the current regimen. - he is maintaining his treatment goal with the current regimen.     He was advised against drinking alcohol with the narcotic pain medicines, advised against driving or handling machinery while adjusting the dose of medicines or if having cognitive  issues related to the current medications.Risk of overdose and death, if medicines not taken as prescribed, were also discussed. If the patient develops new symptoms or if the symptoms worsen, the patient should call the office.    While transcribing

## 2024-04-12 ENCOUNTER — OFFICE VISIT (OUTPATIENT)
Dept: PAIN MANAGEMENT | Age: 64
End: 2024-04-12
Payer: COMMERCIAL

## 2024-04-12 VITALS
BODY MASS INDEX: 27.09 KG/M2 | OXYGEN SATURATION: 97 % | SYSTOLIC BLOOD PRESSURE: 149 MMHG | DIASTOLIC BLOOD PRESSURE: 93 MMHG | WEIGHT: 211 LBS | HEART RATE: 78 BPM

## 2024-04-12 DIAGNOSIS — G89.4 CHRONIC PAIN SYNDROME: ICD-10-CM

## 2024-04-12 DIAGNOSIS — M51.36 DDD (DEGENERATIVE DISC DISEASE), LUMBAR: ICD-10-CM

## 2024-04-12 DIAGNOSIS — M96.1 POSTLAMINECTOMY SYNDROME, LUMBAR REGION: ICD-10-CM

## 2024-04-12 DIAGNOSIS — M96.1 FAILED NECK SYNDROME: ICD-10-CM

## 2024-04-12 DIAGNOSIS — M79.7 FIBROMYALGIA: ICD-10-CM

## 2024-04-12 DIAGNOSIS — M47.816 LUMBAR SPONDYLOSIS: ICD-10-CM

## 2024-04-12 DIAGNOSIS — M54.12 BRACHIAL NEURITIS OR RADICULITIS: ICD-10-CM

## 2024-04-12 DIAGNOSIS — M54.16 LUMBAR RADICULITIS: ICD-10-CM

## 2024-04-12 PROCEDURE — 99213 OFFICE O/P EST LOW 20 MIN: CPT | Performed by: INTERNAL MEDICINE

## 2024-04-12 PROCEDURE — 3017F COLORECTAL CA SCREEN DOC REV: CPT | Performed by: INTERNAL MEDICINE

## 2024-04-12 PROCEDURE — G8427 DOCREV CUR MEDS BY ELIG CLIN: HCPCS | Performed by: INTERNAL MEDICINE

## 2024-04-12 PROCEDURE — 1036F TOBACCO NON-USER: CPT | Performed by: INTERNAL MEDICINE

## 2024-04-12 PROCEDURE — G8419 CALC BMI OUT NRM PARAM NOF/U: HCPCS | Performed by: INTERNAL MEDICINE

## 2024-04-12 RX ORDER — ZOLPIDEM TARTRATE 10 MG/1
10 TABLET ORAL NIGHTLY PRN
Qty: 30 TABLET | Refills: 0 | Status: SHIPPED | OUTPATIENT
Start: 2024-04-12 | End: 2024-06-11

## 2024-04-12 RX ORDER — OXYCODONE AND ACETAMINOPHEN 7.5; 325 MG/1; MG/1
1 TABLET ORAL EVERY 6 HOURS PRN
Qty: 120 TABLET | Refills: 0 | Status: SHIPPED | OUTPATIENT
Start: 2024-04-12 | End: 2024-05-12

## 2024-04-12 RX ORDER — DICLOFENAC SODIUM 75 MG/1
75 TABLET, DELAYED RELEASE ORAL 2 TIMES DAILY PRN
Qty: 60 TABLET | Refills: 1 | Status: SHIPPED | OUTPATIENT
Start: 2024-04-12

## 2024-04-12 NOTE — PROGRESS NOTES
perceived interfernce  of pain with ADL's. Ability to do home exercises independently. Ability to do household chores indoor and/or outdoor work and social and leisure activities.Improve psychosocial and physical functioning. - he is maintaining his treatment goal with the current regimen.    2. Ability to focus/concentrate at work and perform the duties required of him at work  Sit through a workday without lower extremity symptoms.  Stand 30-60 minutes without lower extremity symptoms.  Ability to lift up to 10-20 lbs. Ability to go up and down stairs.  Sit 30-60 minutes  Without having to stand up frequently. - he is maintaining/progressing towards his work related goals with the current regimen.  - he is maintaining his treatment goal with the current regimen.     He was advised against drinking alcohol with the narcotic pain medicines, advised against driving or handling machinery while adjusting the dose of medicines or if having cognitive  issues related to the current medications.Risk of overdose and death, if medicines not taken as prescribed, were also discussed. If the patient develops new symptoms or if the symptoms worsen, the patient should call the office.    While transcribing every attempt was made to maintain the accuracy of the note in terms of it's contents,there may have been some errors made inadvertently.  Thank you for allowing me to participate in the care of this patient.    Artemio Hoover MD.    Cc: Ricardo Cárdenas MD    I, Esther Murphy, scribing for in the presence  of Dr. Artemio Hoover.   04/12/24  Esther Murphy, Medical Assistant   I, Dr. Artemio Hoover, personally performed the services described in this documentation as scribed by  Esther Murphy MA in my presence and it is both accurate and complete

## 2024-05-07 ENCOUNTER — HOSPITAL ENCOUNTER (OUTPATIENT)
Dept: INTERVENTIONAL RADIOLOGY/VASCULAR | Age: 64
Discharge: HOME | End: 2024-05-09
Payer: COMMERCIAL

## 2024-05-07 DIAGNOSIS — M51.36 DDD (DEGENERATIVE DISC DISEASE), LUMBAR: ICD-10-CM

## 2024-05-07 PROCEDURE — 2500000003 HC RX 250 WO HCPCS: Performed by: RADIOLOGY

## 2024-05-07 PROCEDURE — 62323 NJX INTERLAMINAR LMBR/SAC: CPT

## 2024-05-07 PROCEDURE — 2709999900 HC NON-CHARGEABLE SUPPLY

## 2024-05-07 PROCEDURE — 6360000002 HC RX W HCPCS: Performed by: RADIOLOGY

## 2024-05-07 PROCEDURE — 2580000003 HC RX 258: Performed by: RADIOLOGY

## 2024-05-07 PROCEDURE — 6360000004 HC RX CONTRAST MEDICATION: Performed by: RADIOLOGY

## 2024-05-07 RX ORDER — BUPIVACAINE HYDROCHLORIDE 2.5 MG/ML
INJECTION, SOLUTION EPIDURAL; INFILTRATION; INTRACAUDAL PRN
Status: COMPLETED | OUTPATIENT
Start: 2024-05-07 | End: 2024-05-07

## 2024-05-07 RX ORDER — BETAMETHASONE SODIUM PHOSPHATE AND BETAMETHASONE ACETATE 3; 3 MG/ML; MG/ML
INJECTION, SUSPENSION INTRA-ARTICULAR; INTRALESIONAL; INTRAMUSCULAR; SOFT TISSUE PRN
Status: COMPLETED | OUTPATIENT
Start: 2024-05-07 | End: 2024-05-07

## 2024-05-07 RX ORDER — LIDOCAINE HYDROCHLORIDE 10 MG/ML
INJECTION, SOLUTION EPIDURAL; INFILTRATION; INTRACAUDAL; PERINEURAL PRN
Status: COMPLETED | OUTPATIENT
Start: 2024-05-07 | End: 2024-05-07

## 2024-05-07 RX ADMIN — LIDOCAINE HYDROCHLORIDE 4 ML: 10 INJECTION, SOLUTION EPIDURAL; INFILTRATION; INTRACAUDAL; PERINEURAL at 09:07

## 2024-05-07 RX ADMIN — IOHEXOL 2 ML: 180 INJECTION INTRAVENOUS at 09:07

## 2024-05-07 RX ADMIN — WATER 2 ML: 1 INJECTION INTRAMUSCULAR; INTRAVENOUS; SUBCUTANEOUS at 09:07

## 2024-05-07 RX ADMIN — BUPIVACAINE HYDROCHLORIDE 4 ML: 2.5 INJECTION, SOLUTION EPIDURAL; INFILTRATION; INTRACAUDAL at 09:07

## 2024-05-07 RX ADMIN — BETAMETHASONE SODIUM PHOSPHATE AND BETAMETHASONE ACETATE 12 MG: 3; 3 INJECTION, SUSPENSION INTRA-ARTICULAR; INTRALESIONAL; INTRAMUSCULAR at 09:07

## 2024-05-07 NOTE — DISCHARGE INSTRUCTIONS
strongly suggest that a responsible adult be with you for the next 24 hours for your protection and safety  ____ If the intravenous catheter site is painful, apply warm wet compresses on the site until the soreness is relieved and elevate the arm above the heart.  Call your physician if no improvement in 2 to 3 days    DIETARY INSTRUCTIONS:    ____ Drink extra fluids over the next 24 hours (If not contraindicated by illness or by                   physician order)  ____ Start with clear liquids and progress to normal diet as you feel like eating.  If you experience nausea or repeated episodes of vomiting, which persist beyond 12-24 hours, notify your doctor        __x__ Resume your previous diet    ACTIVITY INSTRUCTIONS:    ____ See other instructions  ____ No special instructions  ____ Rest for 24 hours    __x__ Up as tolerated  __x__ Increase activity as tolerated    Wound/Dressing Instructions:  ____ See other instructions  ____ May shower, tomorrow  __x__ Remove bandage within 24 hours    MEDICATION INSTRUCTIONS:    ____ See Medication Reconciliation Sheet      SPECIAL INSTRUCTIONS:  __________________________________________________________________________________________________________________________________________________________________________________________________________________________________________                                                                                                                                                                                                                                                                                                  Please make sure that you follow-up with your doctor’s office for your results.    Call: _________________________________     FOLLOW-UP APPOINTMENT    Follow up with MD in as directed.     Belongings returned to patient and/or family: Yes.    The Discharge Instructions have been explained to me.  I understand and can

## 2024-05-10 ENCOUNTER — OFFICE VISIT (OUTPATIENT)
Dept: PAIN MANAGEMENT | Age: 64
End: 2024-05-10
Payer: COMMERCIAL

## 2024-05-10 VITALS
OXYGEN SATURATION: 98 % | BODY MASS INDEX: 25.42 KG/M2 | HEART RATE: 84 BPM | WEIGHT: 198 LBS | DIASTOLIC BLOOD PRESSURE: 96 MMHG | SYSTOLIC BLOOD PRESSURE: 154 MMHG

## 2024-05-10 DIAGNOSIS — M47.816 LUMBAR SPONDYLOSIS: ICD-10-CM

## 2024-05-10 DIAGNOSIS — M79.7 FIBROMYALGIA: ICD-10-CM

## 2024-05-10 DIAGNOSIS — M54.12 BRACHIAL NEURITIS OR RADICULITIS: ICD-10-CM

## 2024-05-10 DIAGNOSIS — G89.4 CHRONIC PAIN SYNDROME: ICD-10-CM

## 2024-05-10 DIAGNOSIS — M96.1 POSTLAMINECTOMY SYNDROME, LUMBAR REGION: ICD-10-CM

## 2024-05-10 DIAGNOSIS — M96.1 FAILED NECK SYNDROME: ICD-10-CM

## 2024-05-10 DIAGNOSIS — M51.36 DDD (DEGENERATIVE DISC DISEASE), LUMBAR: ICD-10-CM

## 2024-05-10 DIAGNOSIS — M54.16 LUMBAR RADICULITIS: ICD-10-CM

## 2024-05-10 PROCEDURE — 1036F TOBACCO NON-USER: CPT | Performed by: INTERNAL MEDICINE

## 2024-05-10 PROCEDURE — 99213 OFFICE O/P EST LOW 20 MIN: CPT | Performed by: INTERNAL MEDICINE

## 2024-05-10 PROCEDURE — G8419 CALC BMI OUT NRM PARAM NOF/U: HCPCS | Performed by: INTERNAL MEDICINE

## 2024-05-10 PROCEDURE — G8427 DOCREV CUR MEDS BY ELIG CLIN: HCPCS | Performed by: INTERNAL MEDICINE

## 2024-05-10 PROCEDURE — 3017F COLORECTAL CA SCREEN DOC REV: CPT | Performed by: INTERNAL MEDICINE

## 2024-05-10 RX ORDER — ZOLPIDEM TARTRATE 10 MG/1
10 TABLET ORAL NIGHTLY PRN
Qty: 30 TABLET | Refills: 0 | Status: SHIPPED | OUTPATIENT
Start: 2024-05-10 | End: 2024-07-09

## 2024-05-10 RX ORDER — OXYCODONE AND ACETAMINOPHEN 7.5; 325 MG/1; MG/1
1 TABLET ORAL EVERY 6 HOURS PRN
Qty: 120 TABLET | Refills: 0 | Status: SHIPPED | OUTPATIENT
Start: 2024-05-10 | End: 2024-06-09

## 2024-05-10 RX ORDER — DICLOFENAC SODIUM 75 MG/1
75 TABLET, DELAYED RELEASE ORAL 2 TIMES DAILY PRN
Qty: 60 TABLET | Refills: 1 | Status: SHIPPED | OUTPATIENT
Start: 2024-05-10

## 2024-05-10 NOTE — PROGRESS NOTES
reviewed     MEDICATIONS: Mr. Chavis list of medications were reviewed.His current medications are   Outpatient Medications Prior to Visit   Medication Sig Dispense Refill    diclofenac (VOLTAREN) 75 MG EC tablet Take 1 tablet by mouth 2 times daily as needed for Pain 60 tablet 1    oxyCODONE-acetaminophen (PERCOCET) 7.5-325 MG per tablet Take 1 tablet by mouth every 6 hours as needed for Pain (max 4 per day) for up to 30 days. 120 tablet 0    zolpidem (AMBIEN) 10 MG tablet Take 1 tablet by mouth nightly as needed for Sleep for up to 60 days. 30 tablet 0    methylPREDNISolone (MEDROL, ARYA,) 4 MG tablet Use as directed 1 kit 0    Naloxone HCl (EVZIO) 2 MG/0.4ML SOAJ Use as directed 1 each 0     No facility-administered medications prior to visit.        REVIEW OF SYSTEMS:    Respiratory: Negative for apnea, chest tightness and shortness of breath or change in baseline breathing.      PHYSICAL EXAM:   Nursing note and vitals reviewed. BP (!) 154/96   Pulse 84   Wt 89.8 kg (198 lb)   SpO2 98%   BMI 25.42 kg/m²   Constitutional: He appears well-developed and well-nourished. No acute distress.   Cardiovascular: Normal rate, regular rhythm, normal heart sounds, and does not have murmur.     Pulmonary/Chest: Effort normal. No respiratory distress. He does not have wheezes in the lung fields. He has no rales.     Musculo-Skeletal/Extremities:Gait is normal, assistive devices use: none.    He exhibits edema: none, and no tenderness.   Neurological/Psychiatric:He is alert and oriented to person, place, and time. Coordination is  normal.  His mood isAppropriate and affect is Neutral/Euthymic(normal) .    IMPRESSION:   1. Chronic pain syndrome    2. Fibromyalgia    3. Failed neck syndrome    4. Postlaminectomy syndrome, lumbar region    5. Lumbar spondylosis    6. DDD (degenerative disc disease), lumbar    7. Lumbar radiculitis    8. Brachial neuritis or radiculitis        PLAN:  Informed verbal consent was

## 2024-06-07 ENCOUNTER — OFFICE VISIT (OUTPATIENT)
Dept: PAIN MANAGEMENT | Age: 64
End: 2024-06-07
Payer: COMMERCIAL

## 2024-06-07 VITALS
HEART RATE: 94 BPM | WEIGHT: 203 LBS | SYSTOLIC BLOOD PRESSURE: 150 MMHG | DIASTOLIC BLOOD PRESSURE: 86 MMHG | OXYGEN SATURATION: 98 % | BODY MASS INDEX: 26.06 KG/M2

## 2024-06-07 DIAGNOSIS — M47.816 LUMBAR SPONDYLOSIS: ICD-10-CM

## 2024-06-07 DIAGNOSIS — M54.16 LUMBAR RADICULITIS: ICD-10-CM

## 2024-06-07 DIAGNOSIS — M79.7 FIBROMYALGIA: ICD-10-CM

## 2024-06-07 DIAGNOSIS — M96.1 FAILED NECK SYNDROME: ICD-10-CM

## 2024-06-07 DIAGNOSIS — G89.4 CHRONIC PAIN SYNDROME: ICD-10-CM

## 2024-06-07 DIAGNOSIS — M51.36 DDD (DEGENERATIVE DISC DISEASE), LUMBAR: ICD-10-CM

## 2024-06-07 DIAGNOSIS — M96.1 POSTLAMINECTOMY SYNDROME, LUMBAR REGION: ICD-10-CM

## 2024-06-07 PROCEDURE — 1036F TOBACCO NON-USER: CPT | Performed by: INTERNAL MEDICINE

## 2024-06-07 PROCEDURE — G8427 DOCREV CUR MEDS BY ELIG CLIN: HCPCS | Performed by: INTERNAL MEDICINE

## 2024-06-07 PROCEDURE — 3017F COLORECTAL CA SCREEN DOC REV: CPT | Performed by: INTERNAL MEDICINE

## 2024-06-07 PROCEDURE — G8419 CALC BMI OUT NRM PARAM NOF/U: HCPCS | Performed by: INTERNAL MEDICINE

## 2024-06-07 PROCEDURE — 99213 OFFICE O/P EST LOW 20 MIN: CPT | Performed by: INTERNAL MEDICINE

## 2024-06-07 RX ORDER — DICLOFENAC SODIUM 75 MG/1
75 TABLET, DELAYED RELEASE ORAL 2 TIMES DAILY PRN
Qty: 60 TABLET | Refills: 1 | Status: SHIPPED | OUTPATIENT
Start: 2024-06-07

## 2024-06-07 RX ORDER — ZOLPIDEM TARTRATE 10 MG/1
10 TABLET ORAL NIGHTLY PRN
Qty: 30 TABLET | Refills: 0 | Status: SHIPPED | OUTPATIENT
Start: 2024-06-07 | End: 2024-08-06

## 2024-06-07 RX ORDER — OXYCODONE AND ACETAMINOPHEN 7.5; 325 MG/1; MG/1
1 TABLET ORAL EVERY 6 HOURS PRN
Qty: 120 TABLET | Refills: 0 | Status: SHIPPED | OUTPATIENT
Start: 2024-06-07 | End: 2024-07-07

## 2024-07-08 ENCOUNTER — OFFICE VISIT (OUTPATIENT)
Dept: PAIN MANAGEMENT | Age: 64
End: 2024-07-08
Payer: COMMERCIAL

## 2024-07-08 VITALS
SYSTOLIC BLOOD PRESSURE: 123 MMHG | BODY MASS INDEX: 26.19 KG/M2 | HEART RATE: 95 BPM | OXYGEN SATURATION: 98 % | WEIGHT: 204 LBS | DIASTOLIC BLOOD PRESSURE: 75 MMHG

## 2024-07-08 DIAGNOSIS — G89.4 CHRONIC PAIN SYNDROME: ICD-10-CM

## 2024-07-08 DIAGNOSIS — M51.36 DDD (DEGENERATIVE DISC DISEASE), LUMBAR: ICD-10-CM

## 2024-07-08 DIAGNOSIS — M54.16 LUMBAR RADICULITIS: ICD-10-CM

## 2024-07-08 DIAGNOSIS — M47.816 LUMBAR SPONDYLOSIS: ICD-10-CM

## 2024-07-08 DIAGNOSIS — M79.7 FIBROMYALGIA: ICD-10-CM

## 2024-07-08 DIAGNOSIS — Z91.89 AT RISK FOR RESPIRATORY DEPRESSION DUE TO OPIOID: ICD-10-CM

## 2024-07-08 DIAGNOSIS — M96.1 POSTLAMINECTOMY SYNDROME, LUMBAR REGION: ICD-10-CM

## 2024-07-08 DIAGNOSIS — M54.12 BRACHIAL NEURITIS OR RADICULITIS: ICD-10-CM

## 2024-07-08 DIAGNOSIS — M96.1 FAILED NECK SYNDROME: ICD-10-CM

## 2024-07-08 PROCEDURE — 1036F TOBACCO NON-USER: CPT | Performed by: INTERNAL MEDICINE

## 2024-07-08 PROCEDURE — G8427 DOCREV CUR MEDS BY ELIG CLIN: HCPCS | Performed by: INTERNAL MEDICINE

## 2024-07-08 PROCEDURE — G8419 CALC BMI OUT NRM PARAM NOF/U: HCPCS | Performed by: INTERNAL MEDICINE

## 2024-07-08 PROCEDURE — 99213 OFFICE O/P EST LOW 20 MIN: CPT | Performed by: INTERNAL MEDICINE

## 2024-07-08 PROCEDURE — 3017F COLORECTAL CA SCREEN DOC REV: CPT | Performed by: INTERNAL MEDICINE

## 2024-07-08 RX ORDER — DICLOFENAC SODIUM 75 MG/1
75 TABLET, DELAYED RELEASE ORAL 2 TIMES DAILY PRN
Qty: 60 TABLET | Refills: 1 | Status: SHIPPED | OUTPATIENT
Start: 2024-07-08

## 2024-07-08 RX ORDER — OXYCODONE AND ACETAMINOPHEN 7.5; 325 MG/1; MG/1
1 TABLET ORAL EVERY 6 HOURS PRN
Qty: 112 TABLET | Refills: 0 | Status: SHIPPED | OUTPATIENT
Start: 2024-07-08 | End: 2024-08-05

## 2024-07-08 RX ORDER — ZOLPIDEM TARTRATE 10 MG/1
10 TABLET ORAL NIGHTLY PRN
Qty: 30 TABLET | Refills: 0 | Status: SHIPPED | OUTPATIENT
Start: 2024-07-08 | End: 2024-09-06

## 2024-07-08 NOTE — PROGRESS NOTES
John Chavis  1960  [id number]      HISTORY OF PRESENT ILLNESS:  Mr. Chavis is a 63 y.o. male returns for a follow up visit for pain management  He has a diagnosis of   1. Chronic pain syndrome    2. DDD (degenerative disc disease), lumbar    3. Lumbar radiculitis    4. Primary insomnia    5. Fibromyalgia    6. Postlaminectomy syndrome, lumbar region    7. Brachial neuritis or radiculitis    8. At risk for respiratory depression due to opioid    9. Failed neck syndrome    10. Lumbar spondylosis    11. Mood disorder (HCC)    .      As per information/history obtained from the PADT(patient assessment and documentation tool) -  He complains of pain in the neck and lower back with radiation to the shoulders Bilateral, buttocks, hips Right, upper leg Right, knees Right, lower leg Right, ankles Right, and feet Right He rates the pain 5/10 and describes it as sharp, aching.  Pain is made worse by: movement, walking, standing, sitting, bending, lifting. He denies any side effects from the current pain regimen. Patient reports that since last follow up visit the physical functioning is unchanged, family/social relationships are unchanged, mood is unchanged sleep patterns are unchanged. Mr. Chavis states that since starting the treatment with the current regimen the  overall functioning  in the above aspects is  better, Patient denies misusing/abusing his narcotic pain medications or using any illegal drugs.  There are No indicators for possible drug abuse, addiction or diversion problems.   Upon obtaining the medical history from Mr. Chavis regarding today's office visit for his presenting problems, patient states he has been doing fair, managing okay with the medications. He says he's using Percocet along with Voltaren. He mentions he's working full time. He denies any constipation symptoms.       ALLERGIES: Patients list of allergies were reviewed     MEDICATIONS: Mr. Chavis list of medications were

## 2024-08-05 ENCOUNTER — OFFICE VISIT (OUTPATIENT)
Dept: PAIN MANAGEMENT | Age: 64
End: 2024-08-05
Payer: COMMERCIAL

## 2024-08-05 VITALS
SYSTOLIC BLOOD PRESSURE: 152 MMHG | BODY MASS INDEX: 26.19 KG/M2 | DIASTOLIC BLOOD PRESSURE: 92 MMHG | WEIGHT: 204 LBS | HEART RATE: 87 BPM | OXYGEN SATURATION: 96 %

## 2024-08-05 DIAGNOSIS — M79.7 FIBROMYALGIA: ICD-10-CM

## 2024-08-05 DIAGNOSIS — G89.4 CHRONIC PAIN SYNDROME: ICD-10-CM

## 2024-08-05 DIAGNOSIS — M51.36 DDD (DEGENERATIVE DISC DISEASE), LUMBAR: ICD-10-CM

## 2024-08-05 DIAGNOSIS — M54.16 LUMBAR RADICULITIS: ICD-10-CM

## 2024-08-05 DIAGNOSIS — M96.1 FAILED NECK SYNDROME: ICD-10-CM

## 2024-08-05 DIAGNOSIS — M54.12 BRACHIAL NEURITIS OR RADICULITIS: ICD-10-CM

## 2024-08-05 DIAGNOSIS — M96.1 POSTLAMINECTOMY SYNDROME, LUMBAR REGION: ICD-10-CM

## 2024-08-05 DIAGNOSIS — M47.816 LUMBAR SPONDYLOSIS: ICD-10-CM

## 2024-08-05 PROCEDURE — G8427 DOCREV CUR MEDS BY ELIG CLIN: HCPCS | Performed by: INTERNAL MEDICINE

## 2024-08-05 PROCEDURE — 3017F COLORECTAL CA SCREEN DOC REV: CPT | Performed by: INTERNAL MEDICINE

## 2024-08-05 PROCEDURE — 99213 OFFICE O/P EST LOW 20 MIN: CPT | Performed by: INTERNAL MEDICINE

## 2024-08-05 PROCEDURE — G8419 CALC BMI OUT NRM PARAM NOF/U: HCPCS | Performed by: INTERNAL MEDICINE

## 2024-08-05 PROCEDURE — 1036F TOBACCO NON-USER: CPT | Performed by: INTERNAL MEDICINE

## 2024-08-05 RX ORDER — ZOLPIDEM TARTRATE 10 MG/1
10 TABLET ORAL NIGHTLY PRN
Qty: 30 TABLET | Refills: 1 | Status: SHIPPED | OUTPATIENT
Start: 2024-08-05 | End: 2024-10-04

## 2024-08-05 RX ORDER — OXYCODONE AND ACETAMINOPHEN 7.5; 325 MG/1; MG/1
1 TABLET ORAL EVERY 6 HOURS PRN
Qty: 120 TABLET | Refills: 0 | Status: SHIPPED | OUTPATIENT
Start: 2024-08-05 | End: 2024-09-04

## 2024-08-05 NOTE — PROGRESS NOTES
John Chavis  1960  [id number]      HISTORY OF PRESENT ILLNESS:  Mr. Chavis is a 64 y.o. male returns for a follow up visit for pain management  He has a diagnosis of   1. Chronic pain syndrome    2. DDD (degenerative disc disease), lumbar    3. Lumbar radiculitis    4. Fibromyalgia    5. Postlaminectomy syndrome, lumbar region    6. Mood disorder (HCC)    7. Lumbar spondylosis    8. Failed neck syndrome    9. Brachial neuritis or radiculitis    10. Primary insomnia    .      As per information/history obtained from the PADT(patient assessment and documentation tool) -  He complains of pain in the neck, hands Bilateral, upper back, and lower back with radiation to the buttocks, hips Right, upper leg Right, knees Right, lower leg Right, ankles Right, and feet Right He rates the pain 6/10 and describes it as sharp, aching, numbness, pins and needles.  Pain is made worse by: nothing, movement, walking, standing, sitting, bending, lifting. He denies any side effects from the current pain regimen. Patient reports that since last follow up visit the physical functioning is unchanged, family/social relationships are unchanged, mood is unchanged sleep patterns are unchanged. Mr. Chavis states that since starting the treatment with the current regimen the  overall functioning  in the above aspects is  better, Patient denies misusing/abusing his narcotic pain medications or using any illegal drugs.  There are No indicators for possible drug abuse, addiction or diversion problems. Upon obtaining the medical history from Mr. Chavis regarding today's office visit for his presenting problems, patient states he has been doing fair, he is managing with the medications. Mr. Chavis mentions he is using Voltaren PRN still and Percocet 4 per day. Patient complains of his back and neck bothing hurting. He reports he is working full time.       ALLERGIES: Patients list of allergies were reviewed     MEDICATIONS:

## 2024-09-03 ENCOUNTER — OFFICE VISIT (OUTPATIENT)
Dept: PAIN MANAGEMENT | Age: 64
End: 2024-09-03
Payer: COMMERCIAL

## 2024-09-03 VITALS
SYSTOLIC BLOOD PRESSURE: 135 MMHG | HEART RATE: 98 BPM | BODY MASS INDEX: 26.19 KG/M2 | DIASTOLIC BLOOD PRESSURE: 88 MMHG | OXYGEN SATURATION: 94 % | WEIGHT: 204 LBS

## 2024-09-03 DIAGNOSIS — M79.7 FIBROMYALGIA: ICD-10-CM

## 2024-09-03 DIAGNOSIS — M54.16 LUMBAR RADICULITIS: ICD-10-CM

## 2024-09-03 DIAGNOSIS — G89.4 CHRONIC PAIN SYNDROME: ICD-10-CM

## 2024-09-03 DIAGNOSIS — M96.1 POSTLAMINECTOMY SYNDROME, LUMBAR REGION: ICD-10-CM

## 2024-09-03 DIAGNOSIS — M51.36 DDD (DEGENERATIVE DISC DISEASE), LUMBAR: ICD-10-CM

## 2024-09-03 DIAGNOSIS — M96.1 FAILED NECK SYNDROME: ICD-10-CM

## 2024-09-03 DIAGNOSIS — M54.12 BRACHIAL NEURITIS OR RADICULITIS: ICD-10-CM

## 2024-09-03 DIAGNOSIS — M47.816 LUMBAR SPONDYLOSIS: ICD-10-CM

## 2024-09-03 PROCEDURE — G8427 DOCREV CUR MEDS BY ELIG CLIN: HCPCS | Performed by: INTERNAL MEDICINE

## 2024-09-03 PROCEDURE — 99213 OFFICE O/P EST LOW 20 MIN: CPT | Performed by: INTERNAL MEDICINE

## 2024-09-03 PROCEDURE — 1036F TOBACCO NON-USER: CPT | Performed by: INTERNAL MEDICINE

## 2024-09-03 PROCEDURE — 3017F COLORECTAL CA SCREEN DOC REV: CPT | Performed by: INTERNAL MEDICINE

## 2024-09-03 PROCEDURE — G8419 CALC BMI OUT NRM PARAM NOF/U: HCPCS | Performed by: INTERNAL MEDICINE

## 2024-09-03 RX ORDER — ZOLPIDEM TARTRATE 10 MG/1
10 TABLET ORAL NIGHTLY PRN
Qty: 30 TABLET | Refills: 1 | Status: SHIPPED | OUTPATIENT
Start: 2024-09-03 | End: 2024-11-02

## 2024-09-03 RX ORDER — DICLOFENAC SODIUM 75 MG/1
75 TABLET, DELAYED RELEASE ORAL 2 TIMES DAILY PRN
Qty: 60 TABLET | Refills: 1 | Status: SHIPPED | OUTPATIENT
Start: 2024-09-03

## 2024-09-03 RX ORDER — OXYCODONE AND ACETAMINOPHEN 7.5; 325 MG/1; MG/1
1 TABLET ORAL EVERY 6 HOURS PRN
Qty: 120 TABLET | Refills: 0 | Status: SHIPPED | OUTPATIENT
Start: 2024-09-03 | End: 2024-10-03

## 2024-09-04 NOTE — PROGRESS NOTES
John Chavis  1960  [id number]      HISTORY OF PRESENT ILLNESS:  Mr. Chavis is a 64 y.o. male returns for a follow up visit for pain management  He has a diagnosis of   1. Chronic pain syndrome    2. DDD (degenerative disc disease), lumbar    3. Lumbar radiculitis    4. Fibromyalgia    5. Postlaminectomy syndrome, lumbar region    6. Lumbar spondylosis    7. Failed neck syndrome    8. Brachial neuritis or radiculitis    .      As per information/history obtained from the PADT(patient assessment and documentation tool) -  He complains of pain in the  generalize pain all over   He rates the pain 4/10 and describes it as sharp, aching, burning, pins and needles.  Pain is made worse by: nothing, movement, walking, standing, sitting, bending, lifting. He denies any side effects from the current pain regimen. Patient reports that since last follow up visit the physical functioning is unchanged, family/social relationships are unchanged, mood is unchanged sleep patterns are unchanged. Mr. Chavis states that since starting the treatment with the current regimen the  overall functioning  in the above aspects is  better, Patient denies misusing/abusing his narcotic pain medications or using any illegal drugs.  There are No indicators for possible drug abuse, addiction or diversion problems. Upon obtaining the medical history from Mr. Chavis regarding today's office visit for his presenting problems, patient states he has been doing fair, he is managing with the medications. Mr. Chavis mentions he is using Percocet 4 per day. Patient denies any constipation symptoms. He states he gets back pain flare ups. Patient mentions he is using Voltaren along with Percocet 3-4 per day. He states he is doing some stretches. Patient denies any GERD symptoms. Patient states it is difficult to continue working.       ALLERGIES: Patients list of allergies were reviewed     MEDICATIONS: Mr. Chavis list of medications

## 2024-09-30 ENCOUNTER — OFFICE VISIT (OUTPATIENT)
Dept: PAIN MANAGEMENT | Age: 64
End: 2024-09-30
Payer: COMMERCIAL

## 2024-09-30 VITALS
DIASTOLIC BLOOD PRESSURE: 93 MMHG | HEART RATE: 84 BPM | BODY MASS INDEX: 26.71 KG/M2 | WEIGHT: 208 LBS | OXYGEN SATURATION: 98 % | SYSTOLIC BLOOD PRESSURE: 153 MMHG

## 2024-09-30 DIAGNOSIS — Z91.89 AT RISK FOR RESPIRATORY DEPRESSION DUE TO OPIOID: ICD-10-CM

## 2024-09-30 DIAGNOSIS — M51.369 DDD (DEGENERATIVE DISC DISEASE), LUMBAR: ICD-10-CM

## 2024-09-30 DIAGNOSIS — M54.12 BRACHIAL NEURITIS OR RADICULITIS: ICD-10-CM

## 2024-09-30 DIAGNOSIS — M79.18 MYOFASCIAL PAIN: ICD-10-CM

## 2024-09-30 DIAGNOSIS — M47.816 LUMBAR SPONDYLOSIS: ICD-10-CM

## 2024-09-30 DIAGNOSIS — M96.1 POSTLAMINECTOMY SYNDROME, LUMBAR REGION: ICD-10-CM

## 2024-09-30 DIAGNOSIS — G89.4 CHRONIC PAIN SYNDROME: ICD-10-CM

## 2024-09-30 DIAGNOSIS — M54.16 LUMBAR RADICULITIS: ICD-10-CM

## 2024-09-30 DIAGNOSIS — M96.1 FAILED NECK SYNDROME: ICD-10-CM

## 2024-09-30 PROCEDURE — 99213 OFFICE O/P EST LOW 20 MIN: CPT | Performed by: INTERNAL MEDICINE

## 2024-09-30 PROCEDURE — G8419 CALC BMI OUT NRM PARAM NOF/U: HCPCS | Performed by: INTERNAL MEDICINE

## 2024-09-30 PROCEDURE — 1036F TOBACCO NON-USER: CPT | Performed by: INTERNAL MEDICINE

## 2024-09-30 PROCEDURE — G8427 DOCREV CUR MEDS BY ELIG CLIN: HCPCS | Performed by: INTERNAL MEDICINE

## 2024-09-30 PROCEDURE — 3017F COLORECTAL CA SCREEN DOC REV: CPT | Performed by: INTERNAL MEDICINE

## 2024-09-30 RX ORDER — DICLOFENAC SODIUM 75 MG/1
75 TABLET, DELAYED RELEASE ORAL 2 TIMES DAILY PRN
Qty: 60 TABLET | Refills: 1 | Status: SHIPPED | OUTPATIENT
Start: 2024-09-30

## 2024-09-30 RX ORDER — OXYCODONE AND ACETAMINOPHEN 7.5; 325 MG/1; MG/1
1 TABLET ORAL EVERY 6 HOURS PRN
Qty: 120 TABLET | Refills: 0 | Status: SHIPPED | OUTPATIENT
Start: 2024-09-30 | End: 2024-10-30

## 2024-09-30 RX ORDER — ZOLPIDEM TARTRATE 10 MG/1
10 TABLET ORAL NIGHTLY PRN
Qty: 30 TABLET | Refills: 1 | Status: SHIPPED | OUTPATIENT
Start: 2024-09-30 | End: 2024-11-29

## 2024-10-14 ENCOUNTER — HOSPITAL ENCOUNTER (OUTPATIENT)
Dept: INTERVENTIONAL RADIOLOGY/VASCULAR | Age: 64
Discharge: HOME | End: 2024-10-16
Attending: INTERNAL MEDICINE
Payer: COMMERCIAL

## 2024-10-14 DIAGNOSIS — M54.16 LUMBAR RADICULOPATHY: ICD-10-CM

## 2024-10-14 PROCEDURE — 6360000002 HC RX W HCPCS: Performed by: RADIOLOGY

## 2024-10-14 PROCEDURE — 2709999900 HC NON-CHARGEABLE SUPPLY

## 2024-10-14 PROCEDURE — 62323 NJX INTERLAMINAR LMBR/SAC: CPT

## 2024-10-14 PROCEDURE — 2500000003 HC RX 250 WO HCPCS: Performed by: RADIOLOGY

## 2024-10-14 PROCEDURE — 6360000004 HC RX CONTRAST MEDICATION: Performed by: RADIOLOGY

## 2024-10-14 PROCEDURE — 64483 NJX AA&/STRD TFRM EPI L/S 1: CPT

## 2024-10-14 RX ORDER — BUPIVACAINE HYDROCHLORIDE 2.5 MG/ML
INJECTION, SOLUTION EPIDURAL; INFILTRATION; INTRACAUDAL PRN
Status: COMPLETED | OUTPATIENT
Start: 2024-10-14 | End: 2024-10-14

## 2024-10-14 RX ORDER — IOPAMIDOL 408 MG/ML
INJECTION, SOLUTION INTRATHECAL PRN
Status: COMPLETED | OUTPATIENT
Start: 2024-10-14 | End: 2024-10-14

## 2024-10-14 RX ORDER — TRIAMCINOLONE ACETONIDE 40 MG/ML
INJECTION, SUSPENSION INTRA-ARTICULAR; INTRAMUSCULAR PRN
Status: COMPLETED | OUTPATIENT
Start: 2024-10-14 | End: 2024-10-14

## 2024-10-14 RX ORDER — LIDOCAINE HYDROCHLORIDE 10 MG/ML
INJECTION, SOLUTION EPIDURAL; INFILTRATION; INTRACAUDAL; PERINEURAL PRN
Status: COMPLETED | OUTPATIENT
Start: 2024-10-14 | End: 2024-10-14

## 2024-10-14 RX ADMIN — TRIAMCINOLONE ACETONIDE 80 MG: 40 INJECTION, SUSPENSION INTRA-ARTICULAR; INTRAMUSCULAR at 13:15

## 2024-10-14 RX ADMIN — BUPIVACAINE HYDROCHLORIDE 5 ML: 2.5 INJECTION, SOLUTION EPIDURAL; INFILTRATION; INTRACAUDAL at 13:16

## 2024-10-14 RX ADMIN — LIDOCAINE HYDROCHLORIDE 5 ML: 10 INJECTION, SOLUTION EPIDURAL; INFILTRATION; INTRACAUDAL; PERINEURAL at 13:14

## 2024-10-14 RX ADMIN — IOPAMIDOL 1 ML: 408 INJECTION, SOLUTION INTRATHECAL at 13:16

## 2024-10-28 ENCOUNTER — OFFICE VISIT (OUTPATIENT)
Dept: PAIN MANAGEMENT | Age: 64
End: 2024-10-28
Payer: COMMERCIAL

## 2024-10-28 VITALS
OXYGEN SATURATION: 99 % | HEART RATE: 94 BPM | WEIGHT: 205 LBS | SYSTOLIC BLOOD PRESSURE: 150 MMHG | DIASTOLIC BLOOD PRESSURE: 96 MMHG | BODY MASS INDEX: 26.32 KG/M2

## 2024-10-28 DIAGNOSIS — M96.1 POSTLAMINECTOMY SYNDROME, LUMBAR REGION: ICD-10-CM

## 2024-10-28 DIAGNOSIS — M79.7 FIBROMYALGIA: ICD-10-CM

## 2024-10-28 DIAGNOSIS — G89.4 CHRONIC PAIN SYNDROME: ICD-10-CM

## 2024-10-28 DIAGNOSIS — M47.816 LUMBAR SPONDYLOSIS: ICD-10-CM

## 2024-10-28 DIAGNOSIS — M51.362 DEGENERATION OF INTERVERTEBRAL DISC OF LUMBAR REGION WITH DISCOGENIC BACK PAIN AND LOWER EXTREMITY PAIN: ICD-10-CM

## 2024-10-28 DIAGNOSIS — M54.12 BRACHIAL NEURITIS OR RADICULITIS: ICD-10-CM

## 2024-10-28 DIAGNOSIS — M54.16 LUMBAR RADICULITIS: ICD-10-CM

## 2024-10-28 DIAGNOSIS — M96.1 FAILED NECK SYNDROME: ICD-10-CM

## 2024-10-28 PROCEDURE — G8484 FLU IMMUNIZE NO ADMIN: HCPCS | Performed by: INTERNAL MEDICINE

## 2024-10-28 PROCEDURE — 99213 OFFICE O/P EST LOW 20 MIN: CPT | Performed by: INTERNAL MEDICINE

## 2024-10-28 PROCEDURE — G8427 DOCREV CUR MEDS BY ELIG CLIN: HCPCS | Performed by: INTERNAL MEDICINE

## 2024-10-28 PROCEDURE — 3017F COLORECTAL CA SCREEN DOC REV: CPT | Performed by: INTERNAL MEDICINE

## 2024-10-28 PROCEDURE — 1036F TOBACCO NON-USER: CPT | Performed by: INTERNAL MEDICINE

## 2024-10-28 PROCEDURE — G8419 CALC BMI OUT NRM PARAM NOF/U: HCPCS | Performed by: INTERNAL MEDICINE

## 2024-10-28 NOTE — PROGRESS NOTES
John Chavis  1960  [id number]      HISTORY OF PRESENT ILLNESS:  Mr. Chavis is a 64 y.o. male returns for a follow up visit for pain management  He has a diagnosis of   1. Chronic pain syndrome    2. Degeneration of intervertebral disc of lumbar region with discogenic back pain and lower extremity pain    3. Lumbar radiculitis    4. Lumbar spondylosis    5. Failed neck syndrome    6. Postlaminectomy syndrome, lumbar region    7. Brachial neuritis or radiculitis    8. Myofascial pain    9. Fibromyalgia    10. Mood disorder (HCC)    11. Primary insomnia    .      As per information/history obtained from the PADT(patient assessment and documentation tool) -  He complains of pain in the neck and lower back with radiation to the shoulders Left, hands Bilateral, buttocks, ankles Bilateral, and feet Bilateral He rates the pain 6/10 and describes it as sharp, aching.  Pain is made worse by: movement, walking, standing, sitting, bending, lifting. He denies any side effects from the current pain regimen. Patient reports that since last follow up visit the physical functioning is unchanged, family/social relationships are unchanged, mood is unchanged sleep patterns are unchanged. Mr. Chavis states that since starting the treatment with the current regimen the  overall functioning  in the above aspects is  better, Patient denies misusing/abusing his narcotic pain medications or using any illegal drugs.  There are No indicators for possible drug abuse, addiction or diversion problems.   Upon obtaining the medical history from Mr. Chavis regarding today's office visit for his presenting problems,Patient reports he saw the spine surgeon today, will be getting more MRI's for surgical options. He states he was told poor candidate for surgery on the back. He complains his neck hurts still. He mentions he working full time. 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      ALLERGIES: Patients list of allergies

## 2024-10-29 RX ORDER — OXYCODONE AND ACETAMINOPHEN 7.5; 325 MG/1; MG/1
1 TABLET ORAL EVERY 6 HOURS PRN
Qty: 112 TABLET | Refills: 0 | Status: SHIPPED | OUTPATIENT
Start: 2024-10-29 | End: 2024-11-26

## 2024-10-29 RX ORDER — ZOLPIDEM TARTRATE 10 MG/1
10 TABLET ORAL NIGHTLY PRN
Qty: 30 TABLET | Refills: 1 | Status: SHIPPED | OUTPATIENT
Start: 2024-10-29 | End: 2024-12-28

## 2024-10-29 RX ORDER — DICLOFENAC SODIUM 75 MG/1
75 TABLET, DELAYED RELEASE ORAL 2 TIMES DAILY PRN
Qty: 60 TABLET | Refills: 1 | Status: SHIPPED | OUTPATIENT
Start: 2024-10-29

## 2024-11-25 ENCOUNTER — OFFICE VISIT (OUTPATIENT)
Dept: PAIN MANAGEMENT | Age: 64
End: 2024-11-25
Payer: COMMERCIAL

## 2024-11-25 VITALS — OXYGEN SATURATION: 98 % | HEART RATE: 85 BPM | DIASTOLIC BLOOD PRESSURE: 92 MMHG | SYSTOLIC BLOOD PRESSURE: 147 MMHG

## 2024-11-25 DIAGNOSIS — M79.7 FIBROMYALGIA: ICD-10-CM

## 2024-11-25 DIAGNOSIS — M79.18 MYOFASCIAL PAIN: ICD-10-CM

## 2024-11-25 DIAGNOSIS — M47.816 LUMBAR SPONDYLOSIS: ICD-10-CM

## 2024-11-25 DIAGNOSIS — M96.1 POSTLAMINECTOMY SYNDROME, LUMBAR REGION: ICD-10-CM

## 2024-11-25 DIAGNOSIS — M51.362 DEGENERATION OF INTERVERTEBRAL DISC OF LUMBAR REGION WITH DISCOGENIC BACK PAIN AND LOWER EXTREMITY PAIN: ICD-10-CM

## 2024-11-25 DIAGNOSIS — G89.4 CHRONIC PAIN SYNDROME: ICD-10-CM

## 2024-11-25 DIAGNOSIS — M54.12 BRACHIAL NEURITIS OR RADICULITIS: ICD-10-CM

## 2024-11-25 DIAGNOSIS — M54.16 LUMBAR RADICULITIS: ICD-10-CM

## 2024-11-25 DIAGNOSIS — M96.1 FAILED NECK SYNDROME: ICD-10-CM

## 2024-11-25 DIAGNOSIS — Z51.81 ENCOUNTER FOR THERAPEUTIC DRUG MONITORING: ICD-10-CM

## 2024-11-25 PROCEDURE — G8427 DOCREV CUR MEDS BY ELIG CLIN: HCPCS | Performed by: INTERNAL MEDICINE

## 2024-11-25 PROCEDURE — G8484 FLU IMMUNIZE NO ADMIN: HCPCS | Performed by: INTERNAL MEDICINE

## 2024-11-25 PROCEDURE — G8419 CALC BMI OUT NRM PARAM NOF/U: HCPCS | Performed by: INTERNAL MEDICINE

## 2024-11-25 PROCEDURE — 99213 OFFICE O/P EST LOW 20 MIN: CPT | Performed by: INTERNAL MEDICINE

## 2024-11-25 PROCEDURE — 3017F COLORECTAL CA SCREEN DOC REV: CPT | Performed by: INTERNAL MEDICINE

## 2024-11-25 PROCEDURE — 1036F TOBACCO NON-USER: CPT | Performed by: INTERNAL MEDICINE

## 2024-11-25 RX ORDER — ZOLPIDEM TARTRATE 10 MG/1
10 TABLET ORAL NIGHTLY PRN
Qty: 30 TABLET | Refills: 1 | Status: SHIPPED | OUTPATIENT
Start: 2024-11-25 | End: 2025-01-24

## 2024-11-25 RX ORDER — DICLOFENAC SODIUM 75 MG/1
75 TABLET, DELAYED RELEASE ORAL 2 TIMES DAILY PRN
Qty: 60 TABLET | Refills: 1 | Status: SHIPPED | OUTPATIENT
Start: 2024-11-25

## 2024-11-25 RX ORDER — OXYCODONE AND ACETAMINOPHEN 7.5; 325 MG/1; MG/1
1 TABLET ORAL EVERY 6 HOURS PRN
Qty: 120 TABLET | Refills: 0 | Status: SHIPPED | OUTPATIENT
Start: 2024-11-25 | End: 2024-12-25

## 2024-11-26 NOTE — PROGRESS NOTES
John Chavis  1960  [id number]      HISTORY OF PRESENT ILLNESS:  Mr. Chavis is a 64 y.o. male returns for a follow up visit for pain management  He has a diagnosis of   1. Chronic pain syndrome    2. Degeneration of intervertebral disc of lumbar region with discogenic back pain and lower extremity pain    3. Lumbar radiculitis    4. Lumbar spondylosis    5. Failed neck syndrome    6. Postlaminectomy syndrome, lumbar region    7. Brachial neuritis or radiculitis    8. Fibromyalgia    9. Myofascial pain    10. Mood disorder (HCC)    11. Primary insomnia    12. Encounter for therapeutic drug monitoring    .      As per information/history obtained from the PADT(patient assessment and documentation tool) -  He complains of pain in the neck and lower back with radiation to the shoulders Left, arms Left, elbows Left, hands Left, buttocks, hips Right, upper leg Right, knees Right, lower leg Right, ankles Right, and feet Right He rates the pain 4/10 and describes it as sharp, aching.  Pain is made worse by: movement, walking, standing, sitting, bending, lifting. He denies any side effects from the current pain regimen. Patient reports that since last follow up visit the physical functioning is unchanged, family/social relationships are unchanged, mood is unchanged sleep patterns are unchanged. Mr. Chavis states that since starting the treatment with the current regimen the  overall functioning  in the above aspects is  better, Patient denies misusing/abusing his narcotic pain medications or using any illegal drugs.  There are No indicators for possible drug abuse, addiction or diversion problems.   Upon obtaining the medical history from Mr. Chavis regarding today's office visit for his presenting problems,Patient reports he is doing fair, feeling about the same. He says he's using Percocet 4 per day along with other adjuvants. He mentions she had MRI's done, Lumbar and Cervical spine along with EMG. He

## 2024-12-23 ENCOUNTER — OFFICE VISIT (OUTPATIENT)
Dept: PAIN MANAGEMENT | Age: 64
End: 2024-12-23

## 2024-12-23 VITALS
HEART RATE: 91 BPM | SYSTOLIC BLOOD PRESSURE: 160 MMHG | BODY MASS INDEX: 26.32 KG/M2 | OXYGEN SATURATION: 98 % | DIASTOLIC BLOOD PRESSURE: 98 MMHG | WEIGHT: 205 LBS

## 2024-12-23 DIAGNOSIS — M51.362 DEGENERATION OF INTERVERTEBRAL DISC OF LUMBAR REGION WITH DISCOGENIC BACK PAIN AND LOWER EXTREMITY PAIN: ICD-10-CM

## 2024-12-23 DIAGNOSIS — M79.18 MYOFASCIAL PAIN: ICD-10-CM

## 2024-12-23 DIAGNOSIS — M96.1 POSTLAMINECTOMY SYNDROME, LUMBAR REGION: ICD-10-CM

## 2024-12-23 DIAGNOSIS — G89.4 CHRONIC PAIN SYNDROME: ICD-10-CM

## 2024-12-23 DIAGNOSIS — M54.16 LUMBAR RADICULITIS: ICD-10-CM

## 2024-12-23 DIAGNOSIS — M79.7 FIBROMYALGIA: ICD-10-CM

## 2024-12-23 DIAGNOSIS — M47.816 LUMBAR SPONDYLOSIS: ICD-10-CM

## 2024-12-23 DIAGNOSIS — M96.1 FAILED NECK SYNDROME: ICD-10-CM

## 2024-12-23 DIAGNOSIS — M54.12 BRACHIAL NEURITIS OR RADICULITIS: ICD-10-CM

## 2024-12-23 RX ORDER — ZOLPIDEM TARTRATE 10 MG/1
10 TABLET ORAL NIGHTLY PRN
Qty: 30 TABLET | Refills: 1 | Status: SHIPPED | OUTPATIENT
Start: 2024-12-23 | End: 2025-02-21

## 2024-12-23 RX ORDER — OXYCODONE AND ACETAMINOPHEN 7.5; 325 MG/1; MG/1
1 TABLET ORAL EVERY 6 HOURS PRN
Qty: 120 TABLET | Refills: 0 | Status: SHIPPED | OUTPATIENT
Start: 2024-12-23 | End: 2025-01-22

## 2024-12-23 RX ORDER — DICLOFENAC SODIUM 75 MG/1
75 TABLET, DELAYED RELEASE ORAL 2 TIMES DAILY PRN
Qty: 60 TABLET | Refills: 1 | Status: SHIPPED | OUTPATIENT
Start: 2024-12-23

## 2024-12-23 NOTE — PROGRESS NOTES
syndrome    6. Fibromyalgia    7. Lumbar radiculitis    8. Postlaminectomy syndrome, lumbar region    9. Myofascial pain        PLAN:  Informed verbal consent was obtained.  Risks and benefits of the medications and other alternative treatments  including no treatment have been discussed with the patient. Any questions related to these were addressed. The common side effects of these medications were also explained to the patient.    -[place] record was obtained and reviewed  for the last one year and no indicators of drug misuse  were found. Any other controlled substance prescriptions  seen on the record have been accounted for, I am aware of the patient receiving these medications. [place] record will be rechecked as part of office protocol.    -The risk of concurrent use of benzodiazepines or other controlled substances along with the opioid medications was discussed with patient. The risk of respiratory depression, death, cognitive SE, Falls was d/w patient. Lowering the dose to take the lowest effective dose discussed. Tapering of either benzos or opioids also discussed with patient.   -States will start tapering down on the Xanax, will discuss with PCP, after discontinuing it may switch to non controlled medicine  -Continue with Percocet 4 per day  -Chronic pain of multifactorial etiology present for 25  years. Above diagnosis  established  after complete work up. Management of the chronic pain over the years has included status post 1 neck surgery, 10-12 injections and blocks    Multiple other  interventional and non interventional procedures and has participated in numerous  PT visits and HEP and non pharmacological treatment modalities.  Pharmacological agents have included opioid and non opioid medications which have helped Mr. Chavis manage the chronic disabling pain, improve His quality of life, improve physical and psychosocial functioning  and help relieve suffering.  Current MED 45  Last UDS 11/24

## 2025-01-20 ENCOUNTER — OFFICE VISIT (OUTPATIENT)
Dept: PAIN MANAGEMENT | Age: 65
End: 2025-01-20
Payer: COMMERCIAL

## 2025-01-20 VITALS
HEART RATE: 92 BPM | BODY MASS INDEX: 26.32 KG/M2 | OXYGEN SATURATION: 96 % | DIASTOLIC BLOOD PRESSURE: 82 MMHG | WEIGHT: 205 LBS | SYSTOLIC BLOOD PRESSURE: 137 MMHG

## 2025-01-20 DIAGNOSIS — M54.16 LUMBAR RADICULITIS: ICD-10-CM

## 2025-01-20 DIAGNOSIS — M79.7 FIBROMYALGIA: ICD-10-CM

## 2025-01-20 DIAGNOSIS — M79.18 MYOFASCIAL PAIN: ICD-10-CM

## 2025-01-20 DIAGNOSIS — M47.816 LUMBAR SPONDYLOSIS: ICD-10-CM

## 2025-01-20 DIAGNOSIS — M54.12 BRACHIAL NEURITIS OR RADICULITIS: ICD-10-CM

## 2025-01-20 DIAGNOSIS — G89.4 CHRONIC PAIN SYNDROME: ICD-10-CM

## 2025-01-20 DIAGNOSIS — M96.1 FAILED NECK SYNDROME: ICD-10-CM

## 2025-01-20 DIAGNOSIS — M51.362 DEGENERATION OF INTERVERTEBRAL DISC OF LUMBAR REGION WITH DISCOGENIC BACK PAIN AND LOWER EXTREMITY PAIN: ICD-10-CM

## 2025-01-20 DIAGNOSIS — M96.1 POSTLAMINECTOMY SYNDROME, LUMBAR REGION: ICD-10-CM

## 2025-01-20 PROCEDURE — 99213 OFFICE O/P EST LOW 20 MIN: CPT | Performed by: INTERNAL MEDICINE

## 2025-01-20 RX ORDER — ZOLPIDEM TARTRATE 10 MG/1
10 TABLET ORAL NIGHTLY PRN
Qty: 30 TABLET | Refills: 1 | Status: SHIPPED | OUTPATIENT
Start: 2025-01-20 | End: 2025-03-21

## 2025-01-20 RX ORDER — OXYCODONE AND ACETAMINOPHEN 7.5; 325 MG/1; MG/1
1 TABLET ORAL EVERY 6 HOURS PRN
Qty: 120 TABLET | Refills: 0 | Status: SHIPPED | OUTPATIENT
Start: 2025-01-20 | End: 2025-02-19

## 2025-01-20 RX ORDER — DICLOFENAC SODIUM 75 MG/1
75 TABLET, DELAYED RELEASE ORAL 2 TIMES DAILY PRN
Qty: 60 TABLET | Refills: 1 | Status: SHIPPED | OUTPATIENT
Start: 2025-01-20

## 2025-01-20 NOTE — PROGRESS NOTES
tablet by mouth every 6 hours as needed for Pain (max 4 per day) for up to 30 days. 120 tablet 0    zolpidem (AMBIEN) 10 MG tablet Take 1 tablet by mouth nightly as needed for Sleep for up to 60 days. 30 tablet 1     No current facility-administered medications for this visit.       General Goals of current treatment regimen include improvement in pain, restoration of functioning- with focus on improvement in physical performance, general activity, work or disability,emotional distress, health care utilization and  decreased medication consumption.   Will continue to monitor progress towards achieving/maintaining therapeutic goals with special emphasis on  1. -Improvement in perceived interfernce  of pain with ADL's. YES  -Ability to do home exercises independently. YES  -Ability to do household chores, indoor work and social and leisure activities. YES  -Improve psychosocial and physical functioning.YES  -Ability to do outside chores/ yard work YES    He was advised against drinking alcohol with the narcotic pain medicines, advised against driving or handling machinery while adjusting the dose of medicines or if having cognitive  issues related to the current medications.Risk of overdose and death, if medicines not taken as prescribed, were also discussed. If the patient develops new symptoms or if the symptoms worsen, the patient should call the office.    Thank you for allowing me to participate in the care of this patient.      Cc: Ricardo Cárdenas MD

## 2025-02-20 ENCOUNTER — OFFICE VISIT (OUTPATIENT)
Dept: PAIN MANAGEMENT | Age: 65
End: 2025-02-20
Payer: COMMERCIAL

## 2025-02-20 VITALS
HEART RATE: 84 BPM | SYSTOLIC BLOOD PRESSURE: 137 MMHG | BODY MASS INDEX: 26.71 KG/M2 | DIASTOLIC BLOOD PRESSURE: 81 MMHG | OXYGEN SATURATION: 94 % | WEIGHT: 208 LBS

## 2025-02-20 DIAGNOSIS — M47.816 LUMBAR SPONDYLOSIS: ICD-10-CM

## 2025-02-20 DIAGNOSIS — M79.18 MYOFASCIAL PAIN: ICD-10-CM

## 2025-02-20 DIAGNOSIS — M79.7 FIBROMYALGIA: ICD-10-CM

## 2025-02-20 DIAGNOSIS — M54.16 LUMBAR RADICULITIS: ICD-10-CM

## 2025-02-20 DIAGNOSIS — M54.12 BRACHIAL NEURITIS OR RADICULITIS: ICD-10-CM

## 2025-02-20 DIAGNOSIS — G89.4 CHRONIC PAIN SYNDROME: ICD-10-CM

## 2025-02-20 DIAGNOSIS — M96.1 POSTLAMINECTOMY SYNDROME, LUMBAR REGION: ICD-10-CM

## 2025-02-20 DIAGNOSIS — M51.362 DEGENERATION OF INTERVERTEBRAL DISC OF LUMBAR REGION WITH DISCOGENIC BACK PAIN AND LOWER EXTREMITY PAIN: ICD-10-CM

## 2025-02-20 DIAGNOSIS — M96.1 FAILED NECK SYNDROME: ICD-10-CM

## 2025-02-20 PROCEDURE — 99213 OFFICE O/P EST LOW 20 MIN: CPT | Performed by: INTERNAL MEDICINE

## 2025-02-20 RX ORDER — OXYCODONE AND ACETAMINOPHEN 7.5; 325 MG/1; MG/1
1 TABLET ORAL EVERY 6 HOURS PRN
Qty: 120 TABLET | Refills: 0 | Status: SHIPPED | OUTPATIENT
Start: 2025-02-20 | End: 2025-03-22

## 2025-02-20 RX ORDER — ZOLPIDEM TARTRATE 10 MG/1
10 TABLET ORAL NIGHTLY PRN
Qty: 30 TABLET | Refills: 1 | Status: SHIPPED | OUTPATIENT
Start: 2025-02-20 | End: 2025-04-21

## 2025-02-20 RX ORDER — BUSPIRONE HYDROCHLORIDE 15 MG/1
15 TABLET ORAL 3 TIMES DAILY
COMMUNITY

## 2025-02-20 RX ORDER — DICLOFENAC SODIUM 75 MG/1
75 TABLET, DELAYED RELEASE ORAL 2 TIMES DAILY PRN
Qty: 60 TABLET | Refills: 1 | Status: SHIPPED | OUTPATIENT
Start: 2025-02-20

## 2025-02-20 NOTE — PROGRESS NOTES
John Chavis  1960  [id number]      HISTORY OF PRESENT ILLNESS:  Mr. Chavis is a 64 y.o. male returns for a follow up visit for pain management  He has a diagnosis of   1. Chronic pain syndrome    2. Lumbar spondylosis    3. Brachial neuritis or radiculitis    4. Degeneration of intervertebral disc of lumbar region with discogenic back pain and lower extremity pain    5. Failed neck syndrome    6. Fibromyalgia    7. Lumbar radiculitis    8. Postlaminectomy syndrome, lumbar region    9. Myofascial pain    .      As per information/history obtained from the PADT(patient assessment and documentation tool) -  He complains of pain in the neck, hands Left, upper back, mid back, lower back, and feet Left  He rates the pain 4/10 and describes it as sharp, aching, pins and needles.  Pain is made worse by: nothing, movement, walking, standing, sitting, bending, lifting. He denies any side effects from the current pain regimen. Patient reports that since last follow up visit the physical functioning is unchanged, family/social relationships are unchanged, mood is unchanged sleep patterns are unchanged. Mr. Chavis states that since starting the treatment with the current regimen the  overall functioning  in the above aspects is  better, Patient denies misusing/abusing his narcotic pain medications or using any illegal drugs.  There are No indicators for possible drug abuse, addiction or diversion problems.   Upon obtaining the medical history from Mr. Chavis regarding today's office visit for his presenting problems, patient states he has been doing fair, his pain has been manageable. Mr. Chavis states he had the RFA done, he states it did help a lot.  Patient states he saw his PCP who started him on Buspar and tapering off Xanax. He denies any side effects. Patient reports he is working full time.       ALLERGIES: Patients list of allergies were reviewed     MEDICATIONS: Mr. Chavis list of medications

## 2025-03-21 ENCOUNTER — OFFICE VISIT (OUTPATIENT)
Dept: PAIN MANAGEMENT | Age: 65
End: 2025-03-21
Payer: COMMERCIAL

## 2025-03-21 VITALS
BODY MASS INDEX: 26.71 KG/M2 | OXYGEN SATURATION: 98 % | HEART RATE: 99 BPM | WEIGHT: 208 LBS | SYSTOLIC BLOOD PRESSURE: 156 MMHG | DIASTOLIC BLOOD PRESSURE: 97 MMHG

## 2025-03-21 DIAGNOSIS — M47.816 LUMBAR SPONDYLOSIS: ICD-10-CM

## 2025-03-21 DIAGNOSIS — M54.12 BRACHIAL NEURITIS OR RADICULITIS: ICD-10-CM

## 2025-03-21 DIAGNOSIS — Z79.899 ENCOUNTER FOR LONG-TERM (CURRENT) USE OF HIGH-RISK MEDICATION: ICD-10-CM

## 2025-03-21 DIAGNOSIS — M96.1 FAILED NECK SYNDROME: ICD-10-CM

## 2025-03-21 DIAGNOSIS — M54.16 LUMBAR RADICULITIS: ICD-10-CM

## 2025-03-21 DIAGNOSIS — G89.4 CHRONIC PAIN SYNDROME: ICD-10-CM

## 2025-03-21 DIAGNOSIS — M79.18 MYOFASCIAL PAIN: ICD-10-CM

## 2025-03-21 DIAGNOSIS — M51.362 DEGENERATION OF INTERVERTEBRAL DISC OF LUMBAR REGION WITH DISCOGENIC BACK PAIN AND LOWER EXTREMITY PAIN: ICD-10-CM

## 2025-03-21 DIAGNOSIS — M96.1 POSTLAMINECTOMY SYNDROME, LUMBAR REGION: ICD-10-CM

## 2025-03-21 DIAGNOSIS — F51.01 PRIMARY INSOMNIA: ICD-10-CM

## 2025-03-21 DIAGNOSIS — M79.7 FIBROMYALGIA: ICD-10-CM

## 2025-03-21 PROCEDURE — 99214 OFFICE O/P EST MOD 30 MIN: CPT | Performed by: INTERNAL MEDICINE

## 2025-03-21 RX ORDER — OXYCODONE AND ACETAMINOPHEN 7.5; 325 MG/1; MG/1
1 TABLET ORAL EVERY 6 HOURS PRN
Qty: 120 TABLET | Refills: 0 | Status: SHIPPED | OUTPATIENT
Start: 2025-03-21 | End: 2025-04-20

## 2025-03-21 RX ORDER — ZOLPIDEM TARTRATE 10 MG/1
10 TABLET ORAL NIGHTLY PRN
Qty: 30 TABLET | Refills: 1 | Status: SHIPPED | OUTPATIENT
Start: 2025-03-21 | End: 2025-05-20

## 2025-03-21 NOTE — PROGRESS NOTES
John Chavis  1960  [id number]    HISTORY OF PRESENT ILLNESS:  Mr. Chavis is a 64 y.o. male returns for a follow up visit for multiple medical problems.  His  presenting problems are   1. Chronic pain syndrome    2. Degeneration of intervertebral disc of lumbar region with discogenic back pain and lower extremity pain    3. Lumbar radiculitis    4. Lumbar spondylosis    5. Failed neck syndrome    6. Postlaminectomy syndrome, lumbar region    7. Brachial neuritis or radiculitis    8. Fibromyalgia    9. Myofascial pain    10. Primary insomnia    .    As per information/history obtained from the PADT(patient assessment and documentation tool) -  He complains of pain in the neck and lower back He rates the pain 5/10 and describes it as sharp.  Pain is made worse by: movement, walking, standing, sitting, bending, lifting.  Current treatment regimen has helped relieve about 50% of the pain.  He denies side effects from the current pain regimen.   Patient reports that since last follow up visit the physical functioning is unchanged, family/social relationships are unchanged, mood is unchanged sleep patterns are unchanged.  Mr. Chavis states that since starting the treatment with the current regimen the  overall functioning  in the above aspects is  better,Patient denies neurological bowel or bladder. Patient denies misusing/abusing his narcotic pain medications or using any illegal drugs.  There are No indicators for possible drug abuse, addiction or diversion problems.     Upon obtaining the medical history from Mr. Chavis regarding today's office visit for his presenting problems, patient states he has been doing fair. Mr. Chavis states he is having problems with his Buspar, it was started by his PCP in place of Klonopin (tapering off). He mentions he is using Percocet 4 per day along with other adjuvants and on Voltaren also. Patient states his back still is hurting a lot. He says he feels the RFA is

## 2025-04-17 ENCOUNTER — OFFICE VISIT (OUTPATIENT)
Dept: PAIN MANAGEMENT | Age: 65
End: 2025-04-17
Payer: COMMERCIAL

## 2025-04-17 VITALS
DIASTOLIC BLOOD PRESSURE: 91 MMHG | SYSTOLIC BLOOD PRESSURE: 149 MMHG | HEART RATE: 96 BPM | OXYGEN SATURATION: 95 % | WEIGHT: 210 LBS | BODY MASS INDEX: 26.96 KG/M2

## 2025-04-17 DIAGNOSIS — M96.1 POSTLAMINECTOMY SYNDROME, LUMBAR REGION: ICD-10-CM

## 2025-04-17 DIAGNOSIS — M47.816 LUMBAR SPONDYLOSIS: ICD-10-CM

## 2025-04-17 DIAGNOSIS — M96.1 FAILED NECK SYNDROME: ICD-10-CM

## 2025-04-17 DIAGNOSIS — M79.7 FIBROMYALGIA: ICD-10-CM

## 2025-04-17 DIAGNOSIS — M54.16 LUMBAR RADICULITIS: ICD-10-CM

## 2025-04-17 DIAGNOSIS — M79.18 MYOFASCIAL PAIN: ICD-10-CM

## 2025-04-17 DIAGNOSIS — M54.12 BRACHIAL NEURITIS OR RADICULITIS: ICD-10-CM

## 2025-04-17 DIAGNOSIS — M51.362 DEGENERATION OF INTERVERTEBRAL DISC OF LUMBAR REGION WITH DISCOGENIC BACK PAIN AND LOWER EXTREMITY PAIN: ICD-10-CM

## 2025-04-17 DIAGNOSIS — G89.4 CHRONIC PAIN SYNDROME: ICD-10-CM

## 2025-04-17 PROCEDURE — 99213 OFFICE O/P EST LOW 20 MIN: CPT | Performed by: INTERNAL MEDICINE

## 2025-04-17 RX ORDER — OXYCODONE AND ACETAMINOPHEN 7.5; 325 MG/1; MG/1
1 TABLET ORAL EVERY 6 HOURS PRN
Qty: 120 TABLET | Refills: 0 | Status: SHIPPED | OUTPATIENT
Start: 2025-04-17 | End: 2025-05-17

## 2025-04-17 RX ORDER — DICLOFENAC SODIUM 75 MG/1
75 TABLET, DELAYED RELEASE ORAL 2 TIMES DAILY PRN
Qty: 60 TABLET | Refills: 1 | Status: SHIPPED | OUTPATIENT
Start: 2025-04-17

## 2025-04-17 RX ORDER — ZOLPIDEM TARTRATE 10 MG/1
10 TABLET ORAL NIGHTLY PRN
Qty: 30 TABLET | Refills: 1 | Status: SHIPPED | OUTPATIENT
Start: 2025-04-17 | End: 2025-06-16

## 2025-04-17 NOTE — PROGRESS NOTES
was obtained.  Risks and benefits of the medications and other alternative treatments  including no treatment have been discussed with the patient. Any questions related to these were addressed. The common side effects of these medications were also explained to the patient.    -[place] record was obtained and reviewed  for the last one year and no indicators of drug misuse  were found. Any other controlled substance prescriptions  seen on the record have been accounted for, I am aware of the patient receiving these medications. [place] record will be rechecked as part of office protocol.    -Continue with Percocet 4 x per day   -He was advised to increase fluids ( 5-7  glasses of fluid daily), limit caffeine, avoid cheese products, increase dietary fiber, increase activity and exercise as tolerated and relax regularly and enjoy meals   -Walking as tolerated 20-30 minutes daily   -Chronic pain of multifactorial etiology present for 25  years. Above diagnosis  established  after complete work up. Management of the chronic pain over the years has included status post 1 neck surgery, 10-12 injections and blocks    Multiple other  interventional and non interventional procedures and has participated in numerous  PT visits and HEP and non pharmacological treatment modalities.  Pharmacological agents have included opioid and non opioid medications which have helped Mr. Chavis manage the chronic disabling pain, improve His quality of life, improve physical and psychosocial functioning  and help relieve suffering.  Current MED 45  Last UDS 11/24 consistent   -Above medical history reviewed, Any changes were updated 04/23/25   -Most recent labs were reviewed, + Increase H/H advised to follow up with hematology   -Written informed consent signed regarding risks of combining opioids with Benzodiazepines    -The risk of concurrent use of benzodiazepines or other controlled substances along with the opioid medications was

## 2025-04-18 ENCOUNTER — TELEPHONE (OUTPATIENT)
Dept: PAIN MANAGEMENT | Age: 65
End: 2025-04-18

## 2025-04-18 NOTE — TELEPHONE ENCOUNTER
Submitted PA for Oxycodone-Acetaminophen Via [facility] Key: [id number] STATUS: \"No matching products found. Can't proceed with creation of EOC.\"    Downloaded form and faxed. STATUS: PENDING.    Follow up done daily; if no decision with in three days we will refax.  If another three days goes by with no decision will call the insurance for status.

## 2025-04-18 NOTE — TELEPHONE ENCOUNTER
Per document received, \"The patient does not have active pharmacy coverage.\" ([facility])    Last card uploaded on 1/20/25. Please verify ACTIVE pharmacy coverage, and update EPIC accordingly.     Document uploaded in media.    Once updated, please respond to the pool (P [facility] MEDICATION PRE-AUTH).      Thank you please advise patient.

## 2025-04-18 NOTE — TELEPHONE ENCOUNTER
Called patient to see if he has pharmacy benefits. Patient did not answer LVM for patient to call back

## 2025-04-18 NOTE — TELEPHONE ENCOUNTER
Pt states he just uses good [facility] for his medication and he called the pharmacy and they told him he can pick it up

## 2025-05-13 ENCOUNTER — TELEPHONE (OUTPATIENT)
Dept: PAIN MANAGEMENT | Age: 65
End: 2025-05-13

## 2025-05-13 NOTE — TELEPHONE ENCOUNTER
Called and LM for patient to call us back to move his appt from 5/15/25 to 5/16/25 (no later than 1:30-1:45 pm), Dr. Hoover will not be in the office at his time of appt on 5/15/25.    Please advise patient that we need to move his appt.

## 2025-05-14 ENCOUNTER — OFFICE VISIT (OUTPATIENT)
Dept: PAIN MANAGEMENT | Age: 65
End: 2025-05-14
Payer: COMMERCIAL

## 2025-05-14 VITALS — SYSTOLIC BLOOD PRESSURE: 156 MMHG | OXYGEN SATURATION: 95 % | DIASTOLIC BLOOD PRESSURE: 80 MMHG | HEART RATE: 92 BPM

## 2025-05-14 DIAGNOSIS — M96.1 FAILED NECK SYNDROME: ICD-10-CM

## 2025-05-14 DIAGNOSIS — M79.7 FIBROMYALGIA: ICD-10-CM

## 2025-05-14 DIAGNOSIS — M54.16 LUMBAR RADICULITIS: ICD-10-CM

## 2025-05-14 DIAGNOSIS — M96.1 POSTLAMINECTOMY SYNDROME, LUMBAR REGION: ICD-10-CM

## 2025-05-14 DIAGNOSIS — M79.18 MYOFASCIAL PAIN: ICD-10-CM

## 2025-05-14 DIAGNOSIS — G89.4 CHRONIC PAIN SYNDROME: ICD-10-CM

## 2025-05-14 DIAGNOSIS — M47.816 LUMBAR SPONDYLOSIS: ICD-10-CM

## 2025-05-14 PROCEDURE — 99213 OFFICE O/P EST LOW 20 MIN: CPT | Performed by: INTERNAL MEDICINE

## 2025-05-14 RX ORDER — DICLOFENAC SODIUM 75 MG/1
75 TABLET, DELAYED RELEASE ORAL 2 TIMES DAILY PRN
Qty: 60 TABLET | Refills: 0 | Status: SHIPPED | OUTPATIENT
Start: 2025-05-14

## 2025-05-14 RX ORDER — ZOLPIDEM TARTRATE 10 MG/1
10 TABLET ORAL NIGHTLY PRN
Qty: 30 TABLET | Refills: 1 | Status: SHIPPED | OUTPATIENT
Start: 2025-05-14 | End: 2025-07-13

## 2025-05-14 RX ORDER — OXYCODONE AND ACETAMINOPHEN 7.5; 325 MG/1; MG/1
1 TABLET ORAL EVERY 6 HOURS PRN
Qty: 120 TABLET | Refills: 0 | Status: SHIPPED | OUTPATIENT
Start: 2025-05-14 | End: 2025-06-13

## 2025-05-14 NOTE — PROGRESS NOTES
John Chavis  1960  [id number]      HISTORY OF PRESENT ILLNESS:  Mr. Chavis is a 64 y.o. male returns for a follow up visit for pain management  He has a diagnosis of   1. Chronic pain syndrome    2. Lumbar spondylosis    3. Failed neck syndrome    4. Lumbar radiculitis    5. Postlaminectomy syndrome, lumbar region    6. Myofascial pain    7. Fibromyalgia    .      As per information/history obtained from the PADT(patient assessment and documentation tool) -  He complains of pain in the neck, hands Left, upper back, mid back, lower back, and feet Left He rates the pain 5/10 and describes it as sharp, aching, pins and needles.  Pain is made worse by: movement, walking, standing, sitting, bending, lifting. He denies any side effects from the current pain regimen. Patient reports that since last follow up visit the physical functioning is unchanged, family/social relationships are unchanged, mood is unchanged sleep patterns are unchanged. Mr. Chavis states that since starting the treatment with the current regimen the  overall functioning  in the above aspects is  better, Patient denies misusing/abusing his narcotic pain medications or using any illegal drugs.  There are No indicators for possible drug abuse, addiction or diversion problems.   Upon obtaining the medical history from Mr. Chavis regarding today's office visit for his presenting problems, patient states he has been doing fair, he is managing okay with the medications. Mr. Chavis mentions he is using Percocet along with Voltaren. Patient denies any constipation symptoms. He says he is using Ambien, he states it is helping with sleep. He reports he is working full time.       ALLERGIES: Patients list of allergies were reviewed     MEDICATIONS: Mr. Chavis list of medications were reviewed.His current medications are   Outpatient Medications Prior to Visit   Medication Sig Dispense Refill    busPIRone (BUSPAR) 15 MG tablet Take 15 mg by

## 2025-06-12 ENCOUNTER — OFFICE VISIT (OUTPATIENT)
Dept: PAIN MANAGEMENT | Age: 65
End: 2025-06-12

## 2025-06-12 VITALS
SYSTOLIC BLOOD PRESSURE: 135 MMHG | DIASTOLIC BLOOD PRESSURE: 83 MMHG | OXYGEN SATURATION: 96 % | WEIGHT: 202 LBS | HEART RATE: 73 BPM | BODY MASS INDEX: 25.94 KG/M2

## 2025-06-12 DIAGNOSIS — M47.816 LUMBAR SPONDYLOSIS: ICD-10-CM

## 2025-06-12 DIAGNOSIS — M96.1 POSTLAMINECTOMY SYNDROME, LUMBAR REGION: ICD-10-CM

## 2025-06-12 DIAGNOSIS — M96.1 FAILED NECK SYNDROME: ICD-10-CM

## 2025-06-12 DIAGNOSIS — M79.7 FIBROMYALGIA: ICD-10-CM

## 2025-06-12 DIAGNOSIS — G89.4 CHRONIC PAIN SYNDROME: ICD-10-CM

## 2025-06-12 DIAGNOSIS — M51.362 DEGENERATION OF INTERVERTEBRAL DISC OF LUMBAR REGION WITH DISCOGENIC BACK PAIN AND LOWER EXTREMITY PAIN: ICD-10-CM

## 2025-06-12 DIAGNOSIS — M54.12 BRACHIAL NEURITIS OR RADICULITIS: ICD-10-CM

## 2025-06-12 DIAGNOSIS — M54.16 LUMBAR RADICULITIS: ICD-10-CM

## 2025-06-12 DIAGNOSIS — M79.18 MYOFASCIAL PAIN: ICD-10-CM

## 2025-06-12 RX ORDER — DICLOFENAC SODIUM 75 MG/1
75 TABLET, DELAYED RELEASE ORAL 2 TIMES DAILY PRN
Qty: 60 TABLET | Refills: 0 | Status: SHIPPED | OUTPATIENT
Start: 2025-06-12

## 2025-06-12 RX ORDER — OXYCODONE AND ACETAMINOPHEN 7.5; 325 MG/1; MG/1
1 TABLET ORAL EVERY 6 HOURS PRN
Qty: 120 TABLET | Refills: 0 | Status: SHIPPED | OUTPATIENT
Start: 2025-06-12 | End: 2025-07-12

## 2025-06-12 RX ORDER — ZOLPIDEM TARTRATE 10 MG/1
10 TABLET ORAL NIGHTLY PRN
Qty: 30 TABLET | Refills: 1 | Status: SHIPPED | OUTPATIENT
Start: 2025-06-12 | End: 2025-08-11

## 2025-06-12 NOTE — PROGRESS NOTES
syndrome    6. Fibromyalgia    7. Lumbar radiculitis    8. Brachial neuritis or radiculitis    9. Myofascial pain        PLAN:  Informed verbal consent was obtained.  Risks and benefits of the medications and other alternative treatments  including no treatment have been discussed with the patient. Any questions related to these were addressed. The common side effects of these medications were also explained to the patient.    -[place] record was obtained and reviewed  for the last one year and no indicators of drug misuse  were found. Any other controlled substance prescriptions  seen on the record have been accounted for, I am aware of the patient receiving these medications. [place] record will be rechecked as part of office protocol.    -Continue with Percocet 4 per day  -He was advised to increase fluids ( 5-7  glasses of fluid daily), limit caffeine, avoid cheese products, increase dietary fiber, increase activity and exercise as tolerated and relax regularly and enjoy meals   -Continue with Voltaren as needed   -Walking as tolerated   -Advised to start warm water exercises   -Chronic pain of multifactorial etiology present for 25  years. Above diagnosis  established  after complete work up. Management of the chronic pain over the years has included status post 1 neck surgery, 10-12 injections and blocks    Multiple other  interventional and non interventional procedures and has participated in numerous  PT visits and HEP and non pharmacological treatment modalities.  Pharmacological agents have included opioid and non opioid medications which have helped Mr. Chavis manage the chronic disabling pain, improve His quality of life, improve physical and psychosocial functioning  and help relieve suffering.  Current MED 45  Last UDS 11/24 consistent   -Above medical history reviewed, Any changes were updated 06/12/25    Current Outpatient Medications   Medication Sig Dispense Refill    diclofenac (VOLTAREN) 75 MG EC

## 2025-07-10 ENCOUNTER — OFFICE VISIT (OUTPATIENT)
Dept: PAIN MANAGEMENT | Age: 65
End: 2025-07-10

## 2025-07-10 VITALS
SYSTOLIC BLOOD PRESSURE: 127 MMHG | HEIGHT: 74 IN | OXYGEN SATURATION: 97 % | DIASTOLIC BLOOD PRESSURE: 82 MMHG | BODY MASS INDEX: 25.67 KG/M2 | WEIGHT: 200 LBS | HEART RATE: 96 BPM

## 2025-07-10 DIAGNOSIS — M96.1 FAILED NECK SYNDROME: ICD-10-CM

## 2025-07-10 DIAGNOSIS — M96.1 POSTLAMINECTOMY SYNDROME, LUMBAR REGION: ICD-10-CM

## 2025-07-10 DIAGNOSIS — M79.18 MYOFASCIAL PAIN: ICD-10-CM

## 2025-07-10 DIAGNOSIS — M51.362 DEGENERATION OF INTERVERTEBRAL DISC OF LUMBAR REGION WITH DISCOGENIC BACK PAIN AND LOWER EXTREMITY PAIN: ICD-10-CM

## 2025-07-10 DIAGNOSIS — M79.7 FIBROMYALGIA: ICD-10-CM

## 2025-07-10 DIAGNOSIS — M54.16 LUMBAR RADICULITIS: ICD-10-CM

## 2025-07-10 DIAGNOSIS — G89.4 CHRONIC PAIN SYNDROME: ICD-10-CM

## 2025-07-10 DIAGNOSIS — M47.816 LUMBAR SPONDYLOSIS: ICD-10-CM

## 2025-07-10 DIAGNOSIS — M54.12 BRACHIAL NEURITIS OR RADICULITIS: ICD-10-CM

## 2025-07-10 RX ORDER — ZOLPIDEM TARTRATE 10 MG/1
10 TABLET ORAL NIGHTLY PRN
Qty: 30 TABLET | Refills: 1 | Status: SHIPPED | OUTPATIENT
Start: 2025-07-10 | End: 2025-09-08

## 2025-07-10 RX ORDER — DICLOFENAC SODIUM 75 MG/1
75 TABLET, DELAYED RELEASE ORAL 2 TIMES DAILY PRN
Qty: 60 TABLET | Refills: 0 | Status: SHIPPED | OUTPATIENT
Start: 2025-07-10

## 2025-07-10 RX ORDER — OXYCODONE AND ACETAMINOPHEN 7.5; 325 MG/1; MG/1
1 TABLET ORAL EVERY 6 HOURS PRN
Qty: 120 TABLET | Refills: 0 | Status: SHIPPED | OUTPATIENT
Start: 2025-07-10 | End: 2025-08-09

## 2025-07-10 NOTE — PROGRESS NOTES
John Chavis  1960  [id number]      HISTORY OF PRESENT ILLNESS:  Mr. Chavis is a 64 y.o. male returns for a follow up visit for pain management  He has a diagnosis of   1. Chronic pain syndrome    2. Failed neck syndrome    3. Lumbar radiculitis    4. Myofascial pain    5. Postlaminectomy syndrome, lumbar region    6. Lumbar spondylosis    7. Fibromyalgia    8. Brachial neuritis or radiculitis    9. Primary insomnia    10. Degeneration of intervertebral disc of lumbar region with discogenic back pain and lower extremity pain    .      As per information/history obtained from the PADT(patient assessment and documentation tool) -  He complains of pain in the neck, upper back, mid back, and lower back with radiation to the shoulders Bilateral, arms Left, elbows Left, hands Left, buttocks, hips Right, upper leg Right, knees Right, lower leg Right, ankles Right, and feet Right He rates the pain 7/10 and describes it as aching, burning, numbness.  Pain is made worse by: movement, walking, standing, sitting, bending, lifting. He denies any side effects from the current pain regimen. Patient reports that since last follow up visit the physical functioning is unchanged, family/social relationships are unchanged, mood is unchanged sleep patterns are unchanged. Mr. Chavis states that since starting the treatment with the current regimen the  overall functioning  in the above aspects is  better, Patient denies misusing/abusing his narcotic pain medications or using any illegal drugs.  There are No indicators for possible drug abuse, addiction or diversion problems.   Upon obtaining the medical history from Mr. Chavis regarding today's office visit for his presenting problems, patient states he has been doing fair. Mr. Chavis reports he is working full time. He mentions he is using Percocet along with Voltaren and other adjuvants. He states he is working more at work which is causing some increase back pain.

## 2025-08-07 ENCOUNTER — OFFICE VISIT (OUTPATIENT)
Dept: PAIN MANAGEMENT | Age: 65
End: 2025-08-07
Payer: COMMERCIAL

## 2025-08-07 VITALS
DIASTOLIC BLOOD PRESSURE: 68 MMHG | OXYGEN SATURATION: 97 % | BODY MASS INDEX: 26.32 KG/M2 | SYSTOLIC BLOOD PRESSURE: 126 MMHG | HEART RATE: 86 BPM | WEIGHT: 205 LBS

## 2025-08-07 DIAGNOSIS — M51.362 DEGENERATION OF INTERVERTEBRAL DISC OF LUMBAR REGION WITH DISCOGENIC BACK PAIN AND LOWER EXTREMITY PAIN: ICD-10-CM

## 2025-08-07 DIAGNOSIS — M47.816 LUMBAR SPONDYLOSIS: ICD-10-CM

## 2025-08-07 DIAGNOSIS — M96.1 FAILED NECK SYNDROME: ICD-10-CM

## 2025-08-07 DIAGNOSIS — G89.4 CHRONIC PAIN SYNDROME: ICD-10-CM

## 2025-08-07 DIAGNOSIS — M54.16 LUMBAR RADICULITIS: ICD-10-CM

## 2025-08-07 DIAGNOSIS — M96.1 POSTLAMINECTOMY SYNDROME, LUMBAR REGION: ICD-10-CM

## 2025-08-07 DIAGNOSIS — M79.7 FIBROMYALGIA: ICD-10-CM

## 2025-08-07 PROCEDURE — 99213 OFFICE O/P EST LOW 20 MIN: CPT | Performed by: INTERNAL MEDICINE

## 2025-08-07 RX ORDER — DICLOFENAC SODIUM 75 MG/1
75 TABLET, DELAYED RELEASE ORAL 2 TIMES DAILY PRN
Qty: 60 TABLET | Refills: 0 | Status: SHIPPED | OUTPATIENT
Start: 2025-08-07

## 2025-08-07 RX ORDER — ZOLPIDEM TARTRATE 10 MG/1
10 TABLET ORAL NIGHTLY PRN
Qty: 30 TABLET | Refills: 1 | Status: SHIPPED | OUTPATIENT
Start: 2025-08-07 | End: 2025-10-06

## 2025-08-07 RX ORDER — OXYCODONE AND ACETAMINOPHEN 7.5; 325 MG/1; MG/1
1 TABLET ORAL EVERY 6 HOURS PRN
Qty: 120 TABLET | Refills: 0 | Status: SHIPPED | OUTPATIENT
Start: 2025-08-07 | End: 2025-09-06

## 2025-08-21 ENCOUNTER — TELEPHONE (OUTPATIENT)
Dept: PAIN MANAGEMENT | Age: 65
End: 2025-08-21